# Patient Record
Sex: FEMALE | Race: WHITE | NOT HISPANIC OR LATINO | Employment: OTHER | URBAN - METROPOLITAN AREA
[De-identification: names, ages, dates, MRNs, and addresses within clinical notes are randomized per-mention and may not be internally consistent; named-entity substitution may affect disease eponyms.]

---

## 2017-03-01 ENCOUNTER — CONVERSION ENCOUNTER (OUTPATIENT)
Dept: FAMILY MEDICINE CLINIC | Facility: CLINIC | Age: 72
End: 2017-03-01

## 2017-03-10 ENCOUNTER — HOSPITAL ENCOUNTER (OUTPATIENT)
Dept: PREADMISSION TESTING | Facility: HOSPITAL | Age: 72
Discharge: HOME | End: 2017-03-10
Attending: INTERNAL MEDICINE | Admitting: INTERNAL MEDICINE

## 2017-03-10 LAB
ANION GAP SERPL CALC-SCNC: 13 MMOL/L (ref 10–20)
APTT BLD: 23.6 SEC (ref 24–31)
BASOPHILS # BLD AUTO: 0 10*3/UL (ref 0–0.2)
BASOPHILS NFR BLD AUTO: 1 % (ref 0–2)
BUN SERPL-MCNC: 25 MG/DL (ref 8–20)
BUN/CREAT SERPL: 19.2 (ref 5.4–26.2)
CALCIUM SERPL-MCNC: 10 MG/DL (ref 8.9–10.3)
CHLORIDE SERPL-SCNC: 106 MMOL/L (ref 101–111)
CONV CO2: 25 MMOL/L (ref 22–32)
CREAT UR-MCNC: 1.3 MG/DL (ref 0.4–1)
DIFFERENTIAL METHOD BLD: (no result)
EOSINOPHIL # BLD AUTO: 0.2 10*3/UL (ref 0–0.3)
EOSINOPHIL # BLD AUTO: 2 % (ref 0–3)
ERYTHROCYTE [DISTWIDTH] IN BLOOD BY AUTOMATED COUNT: 13.5 % (ref 11.5–14.5)
GLUCOSE SERPL-MCNC: 131 MG/DL (ref 65–99)
HCT VFR BLD AUTO: 33.4 % (ref 35–49)
HGB BLD-MCNC: 11.6 G/DL (ref 12–15)
INR PPP: 0.8
LYMPHOCYTES # BLD AUTO: 1 10*3/UL (ref 0.8–4.8)
LYMPHOCYTES NFR BLD AUTO: 14 % (ref 18–42)
MCH RBC QN AUTO: 28.9 PG (ref 26–32)
MCHC RBC AUTO-ENTMCNC: 34.7 G/DL (ref 32–36)
MCV RBC AUTO: 83.3 FL (ref 80–94)
MONOCYTES # BLD AUTO: 0.5 10*3/UL (ref 0.1–1.3)
MONOCYTES NFR BLD AUTO: 7 % (ref 2–11)
NEUTROPHILS # BLD AUTO: 5.5 10*3/UL (ref 2.3–8.6)
NEUTROPHILS NFR BLD AUTO: 76 % (ref 50–75)
NRBC BLD AUTO-RTO: 0 /100{WBCS}
NRBC/RBC NFR BLD MANUAL: 0 10*3/UL
PLATELET # BLD AUTO: 218 10*3/UL (ref 150–450)
PMV BLD AUTO: 8 FL (ref 7.4–10.4)
POTASSIUM SERPL-SCNC: 4 MMOL/L (ref 3.6–5.1)
PROTHROMBIN TIME: 11.3 SEC (ref 9.6–11.7)
RBC # BLD AUTO: 4.01 10*6/UL (ref 4–5.4)
SODIUM SERPL-SCNC: 140 MMOL/L (ref 136–144)
WBC # BLD AUTO: 7.3 10*3/UL (ref 4.5–11.5)

## 2017-07-10 ENCOUNTER — CONVERSION ENCOUNTER (OUTPATIENT)
Dept: FAMILY MEDICINE CLINIC | Facility: CLINIC | Age: 72
End: 2017-07-10

## 2018-07-09 ENCOUNTER — CONVERSION ENCOUNTER (OUTPATIENT)
Dept: FAMILY MEDICINE CLINIC | Facility: CLINIC | Age: 73
End: 2018-07-09

## 2018-10-15 ENCOUNTER — HOSPITAL ENCOUNTER (OUTPATIENT)
Dept: FAMILY MEDICINE CLINIC | Facility: CLINIC | Age: 73
Setting detail: SPECIMEN
Discharge: HOME | End: 2018-10-15
Attending: FAMILY MEDICINE | Admitting: FAMILY MEDICINE

## 2018-10-15 ENCOUNTER — CONVERSION ENCOUNTER (OUTPATIENT)
Dept: FAMILY MEDICINE CLINIC | Facility: CLINIC | Age: 73
End: 2018-10-15

## 2018-10-15 LAB
ALBUMIN SERPL-MCNC: 4.3 G/DL (ref 3.5–4.8)
ALBUMIN/GLOB SERPL: 1.5 {RATIO} (ref 1–1.7)
ALP SERPL-CCNC: 78 IU/L (ref 32–91)
ALT SERPL-CCNC: 18 IU/L (ref 14–54)
ANION GAP SERPL CALC-SCNC: 15.1 MMOL/L (ref 10–20)
AST SERPL-CCNC: 22 IU/L (ref 15–41)
BASOPHILS # BLD AUTO: 0.1 10*3/UL (ref 0–0.2)
BASOPHILS NFR BLD AUTO: 1 % (ref 0–2)
BILIRUB SERPL-MCNC: 0.9 MG/DL (ref 0.3–1.2)
BUN SERPL-MCNC: 27 MG/DL (ref 8–20)
BUN/CREAT SERPL: 16.9 (ref 5.4–26.2)
CALCIUM SERPL-MCNC: 10.4 MG/DL (ref 8.9–10.3)
CHLORIDE SERPL-SCNC: 103 MMOL/L (ref 101–111)
CHOLEST SERPL-MCNC: 212 MG/DL
CHOLEST/HDLC SERPL: 6 {RATIO}
CONV CO2: 24 MMOL/L (ref 22–32)
CONV LDL CHOLESTEROL DIRECT: 79 MG/DL (ref 0–100)
CONV TOTAL PROTEIN: 7.2 G/DL (ref 6.1–7.9)
CREAT UR-MCNC: 1.6 MG/DL (ref 0.4–1)
CRP SERPL-MCNC: 0.6 MG/DL (ref 0–0.7)
DIFFERENTIAL METHOD BLD: (no result)
EOSINOPHIL # BLD AUTO: 0.2 10*3/UL (ref 0–0.3)
EOSINOPHIL # BLD AUTO: 2 % (ref 0–3)
ERYTHROCYTE [DISTWIDTH] IN BLOOD BY AUTOMATED COUNT: 14.8 % (ref 11.5–14.5)
ERYTHROCYTE [SEDIMENTATION RATE] IN BLOOD BY WESTERGREN METHOD: 22 MM/HR (ref 0–30)
FOLATE SERPL-MCNC: 20.1 NG/ML (ref 5.9–24.8)
GLOBULIN UR ELPH-MCNC: 2.9 G/DL (ref 2.5–3.8)
GLUCOSE SERPL-MCNC: 152 MG/DL (ref 65–99)
HBA1C MFR BLD: 6.7 % (ref 0–5.6)
HCT VFR BLD AUTO: 35 % (ref 35–49)
HDLC SERPL-MCNC: 36 MG/DL
HGB BLD-MCNC: 12 G/DL (ref 12–15)
IRON SATN MFR SERPL: 20 % (ref 15–50)
IRON SERPL-MCNC: 66 UG/DL (ref 28–170)
LDLC/HDLC SERPL: 2.2 {RATIO}
LIPID INTERPRETATION: ABNORMAL
LYMPHOCYTES # BLD AUTO: 0.9 10*3/UL (ref 0.8–4.8)
LYMPHOCYTES NFR BLD AUTO: 12 % (ref 18–42)
MAGNESIUM SERPL-MCNC: 1.6 MG/DL (ref 1.8–2.5)
MCH RBC QN AUTO: 28.1 PG (ref 26–32)
MCHC RBC AUTO-ENTMCNC: 34.4 G/DL (ref 32–36)
MCV RBC AUTO: 81.9 FL (ref 80–94)
MONOCYTES # BLD AUTO: 0.4 10*3/UL (ref 0.1–1.3)
MONOCYTES NFR BLD AUTO: 6 % (ref 2–11)
NEUTROPHILS # BLD AUTO: 5.7 10*3/UL (ref 2.3–8.6)
NEUTROPHILS NFR BLD AUTO: 79 % (ref 50–75)
NRBC BLD AUTO-RTO: 0 /100{WBCS}
NRBC/RBC NFR BLD MANUAL: 0 10*3/UL
PLATELET # BLD AUTO: 254 10*3/UL (ref 150–450)
PMV BLD AUTO: 7.9 FL (ref 7.4–10.4)
POTASSIUM SERPL-SCNC: 4.1 MMOL/L (ref 3.6–5.1)
RBC # BLD AUTO: 4.27 10*6/UL (ref 4–5.4)
SODIUM SERPL-SCNC: 138 MMOL/L (ref 136–144)
TIBC SERPL-MCNC: 331 UG/DL (ref 228–428)
TRIGL SERPL-MCNC: 405 MG/DL
TSH SERPL-ACNC: 1.63 UIU/ML (ref 0.34–5.6)
VIT B12 SERPL-MCNC: 725 PG/ML (ref 180–914)
VLDLC SERPL CALC-MCNC: 97.2 MG/DL
WBC # BLD AUTO: 7.3 10*3/UL (ref 4.5–11.5)

## 2018-10-17 LAB
ANA SER QL IA: NEGATIVE
CHROMATIN AB SERPL-ACNC: <20 [IU]/ML (ref 0–20)

## 2018-10-18 LAB — VIT B1 BLD-MCNC: 9 NMOL/L (ref 8–30)

## 2018-10-29 ENCOUNTER — HOSPITAL ENCOUNTER (OUTPATIENT)
Dept: RESPIRATORY THERAPY | Facility: HOSPITAL | Age: 73
Discharge: HOME | End: 2018-10-29
Attending: FAMILY MEDICINE | Admitting: FAMILY MEDICINE

## 2018-11-16 ENCOUNTER — HOSPITAL ENCOUNTER (OUTPATIENT)
Dept: FAMILY MEDICINE CLINIC | Facility: CLINIC | Age: 73
Setting detail: SPECIMEN
Discharge: HOME | End: 2018-11-16
Attending: PHYSICIAN ASSISTANT | Admitting: PHYSICIAN ASSISTANT

## 2018-11-16 ENCOUNTER — CONVERSION ENCOUNTER (OUTPATIENT)
Dept: FAMILY MEDICINE CLINIC | Facility: CLINIC | Age: 73
End: 2018-11-16

## 2018-11-16 ENCOUNTER — HOSPITAL ENCOUNTER (OUTPATIENT)
Dept: CARDIOLOGY | Facility: HOSPITAL | Age: 73
Discharge: HOME | End: 2018-11-16
Attending: PHYSICIAN ASSISTANT | Admitting: PHYSICIAN ASSISTANT

## 2018-11-16 LAB
ALBUMIN SERPL-MCNC: 4.2 G/DL (ref 3.5–4.8)
ALBUMIN/GLOB SERPL: 1.4 {RATIO} (ref 1–1.7)
ALP SERPL-CCNC: 68 IU/L (ref 32–91)
ALT SERPL-CCNC: 15 IU/L (ref 14–54)
ANION GAP SERPL CALC-SCNC: 15.3 MMOL/L (ref 10–20)
AST SERPL-CCNC: 26 IU/L (ref 15–41)
BACTERIA SPEC AEROBE CULT: NORMAL
BASOPHILS # BLD AUTO: 0.1 10*3/UL (ref 0–0.2)
BASOPHILS NFR BLD AUTO: 1 % (ref 0–2)
BILIRUB SERPL-MCNC: 0.9 MG/DL (ref 0.3–1.2)
BUN SERPL-MCNC: 26 MG/DL (ref 8–20)
BUN/CREAT SERPL: 15.3 (ref 5.4–26.2)
CALCIUM SERPL-MCNC: 10.4 MG/DL (ref 8.9–10.3)
CHLORIDE SERPL-SCNC: 104 MMOL/L (ref 101–111)
CONV CO2: 23 MMOL/L (ref 22–32)
CONV TOTAL PROTEIN: 7.2 G/DL (ref 6.1–7.9)
CREAT UR-MCNC: 1.7 MG/DL (ref 0.4–1)
DIFFERENTIAL METHOD BLD: (no result)
EOSINOPHIL # BLD AUTO: 0.2 10*3/UL (ref 0–0.3)
EOSINOPHIL # BLD AUTO: 2 % (ref 0–3)
ERYTHROCYTE [DISTWIDTH] IN BLOOD BY AUTOMATED COUNT: 14.2 % (ref 11.5–14.5)
GLOBULIN UR ELPH-MCNC: 3 G/DL (ref 2.5–3.8)
GLUCOSE SERPL-MCNC: 138 MG/DL (ref 65–99)
HCT VFR BLD AUTO: 34.1 % (ref 35–49)
HGB BLD-MCNC: 11.5 G/DL (ref 12–15)
LYMPHOCYTES # BLD AUTO: 1.2 10*3/UL (ref 0.8–4.8)
LYMPHOCYTES NFR BLD AUTO: 13 % (ref 18–42)
Lab: NORMAL
MCH RBC QN AUTO: 27.9 PG (ref 26–32)
MCHC RBC AUTO-ENTMCNC: 33.6 G/DL (ref 32–36)
MCV RBC AUTO: 83 FL (ref 80–94)
MICRO REPORT STATUS: NORMAL
MONOCYTES # BLD AUTO: 0.7 10*3/UL (ref 0.1–1.3)
MONOCYTES NFR BLD AUTO: 8 % (ref 2–11)
NEUTROPHILS # BLD AUTO: 6.9 10*3/UL (ref 2.3–8.6)
NEUTROPHILS NFR BLD AUTO: 76 % (ref 50–75)
NRBC BLD AUTO-RTO: 0 /100{WBCS}
NRBC/RBC NFR BLD MANUAL: 0 10*3/UL
PLATELET # BLD AUTO: 304 10*3/UL (ref 150–450)
PMV BLD AUTO: 8.2 FL (ref 7.4–10.4)
POTASSIUM SERPL-SCNC: 4.3 MMOL/L (ref 3.6–5.1)
RBC # BLD AUTO: 4.11 10*6/UL (ref 4–5.4)
SODIUM SERPL-SCNC: 138 MMOL/L (ref 136–144)
SPECIMEN SOURCE: NORMAL
WBC # BLD AUTO: 9.1 10*3/UL (ref 4.5–11.5)

## 2018-11-19 LAB — EBV AB TO VIRAL CAPSID AG, IGM: NORMAL

## 2018-11-30 ENCOUNTER — HOSPITAL ENCOUNTER (OUTPATIENT)
Dept: NUCLEAR MEDICINE | Facility: HOSPITAL | Age: 73
Discharge: HOME | End: 2018-11-30
Attending: FAMILY MEDICINE | Admitting: FAMILY MEDICINE

## 2018-12-05 ENCOUNTER — HOSPITAL ENCOUNTER (OUTPATIENT)
Dept: ONCOLOGY | Facility: HOSPITAL | Age: 73
Discharge: HOME | End: 2018-12-05
Attending: INTERNAL MEDICINE | Admitting: INTERNAL MEDICINE

## 2018-12-05 ENCOUNTER — HOSPITAL ENCOUNTER (OUTPATIENT)
Dept: ONCOLOGY | Facility: CLINIC | Age: 73
Setting detail: INFUSION SERIES
Discharge: HOME | End: 2018-12-05
Attending: INTERNAL MEDICINE | Admitting: INTERNAL MEDICINE

## 2018-12-05 ENCOUNTER — CLINICAL SUPPORT (OUTPATIENT)
Dept: ONCOLOGY | Facility: HOSPITAL | Age: 73
End: 2018-12-05

## 2018-12-05 LAB
FERRITIN SERPL-MCNC: 159 NG/ML (ref 11–307)
FOLATE SERPL-MCNC: 12.9 NG/ML (ref 5.9–24.8)
IRON SATN MFR SERPL: 7 % (ref 15–50)
IRON SERPL-MCNC: 29 UG/DL (ref 28–170)
MAGNESIUM UR-MCNC: 1.11 % (ref 0.5–1.5)
RETICS/RBC NFR MANUAL: 0.05 10*6/UL
TIBC SERPL-MCNC: 397 UG/DL (ref 228–428)

## 2018-12-05 NOTE — PROGRESS NOTES
PATIENTS ONCOLOGY RECORD LOCATED IN [facility]      Subjective     Name:  CHANELLE GONZALES     Date:  2018  Address:  [address] [place] [address]  Home: [unfilled]  :  1945 AGE:  73 y.o.        RECORDS OBTAINED:  Patients Oncology Record is located in [facility]

## 2018-12-07 LAB — HAPTOGLOB SERPL-MCNC: 127 MG/DL (ref 36–195)

## 2018-12-10 ENCOUNTER — CLINICAL SUPPORT (OUTPATIENT)
Dept: ONCOLOGY | Facility: HOSPITAL | Age: 73
End: 2018-12-10

## 2018-12-10 ENCOUNTER — HOSPITAL ENCOUNTER (OUTPATIENT)
Dept: ONCOLOGY | Facility: CLINIC | Age: 73
Setting detail: INFUSION SERIES
Discharge: HOME | End: 2018-12-10
Attending: INTERNAL MEDICINE | Admitting: INTERNAL MEDICINE

## 2018-12-10 ENCOUNTER — HOSPITAL ENCOUNTER (OUTPATIENT)
Dept: FAMILY MEDICINE CLINIC | Facility: CLINIC | Age: 73
Setting detail: SPECIMEN
Discharge: HOME | End: 2018-12-10
Attending: FAMILY MEDICINE | Admitting: FAMILY MEDICINE

## 2018-12-10 LAB
ALBUMIN SERPL-MCNC: 4 G/DL (ref 3.5–4.8)
ALBUMIN/GLOB SERPL: 1.3 {RATIO} (ref 1–1.7)
ALP SERPL-CCNC: 68 IU/L (ref 32–91)
ALT SERPL-CCNC: 16 IU/L (ref 14–54)
ANION GAP SERPL CALC-SCNC: 13.2 MMOL/L (ref 10–20)
AST SERPL-CCNC: 22 IU/L (ref 15–41)
BASOPHILS # BLD AUTO: 0.1 10*3/UL (ref 0–0.2)
BASOPHILS NFR BLD AUTO: 1 % (ref 0–2)
BILIRUB SERPL-MCNC: 0.7 MG/DL (ref 0.3–1.2)
BUN SERPL-MCNC: 29 MG/DL (ref 8–20)
BUN/CREAT SERPL: 18.1 (ref 5.4–26.2)
CALCIUM SERPL-MCNC: 10 MG/DL (ref 8.9–10.3)
CHLORIDE SERPL-SCNC: 104 MMOL/L (ref 101–111)
CONV CO2: 24 MMOL/L (ref 22–32)
CONV TOTAL PROTEIN: 7.2 G/DL (ref 6.1–7.9)
CREAT UR-MCNC: 1.6 MG/DL (ref 0.4–1)
DIFFERENTIAL METHOD BLD: (no result)
EOSINOPHIL # BLD AUTO: 0.2 10*3/UL (ref 0–0.3)
EOSINOPHIL # BLD AUTO: 2 % (ref 0–3)
ERYTHROCYTE [DISTWIDTH] IN BLOOD BY AUTOMATED COUNT: 14.1 % (ref 11.5–14.5)
GLOBULIN UR ELPH-MCNC: 3.2 G/DL (ref 2.5–3.8)
GLUCOSE SERPL-MCNC: 169 MG/DL (ref 65–99)
HCT VFR BLD AUTO: 32.2 % (ref 35–49)
HGB BLD-MCNC: 10.8 G/DL (ref 12–15)
LYMPHOCYTES # BLD AUTO: 0.8 10*3/UL (ref 0.8–4.8)
LYMPHOCYTES NFR BLD AUTO: 11 % (ref 18–42)
MCH RBC QN AUTO: 27.8 PG (ref 26–32)
MCHC RBC AUTO-ENTMCNC: 33.6 G/DL (ref 32–36)
MCV RBC AUTO: 82.6 FL (ref 80–94)
MONOCYTES # BLD AUTO: 0.5 10*3/UL (ref 0.1–1.3)
MONOCYTES NFR BLD AUTO: 7 % (ref 2–11)
NEUTROPHILS # BLD AUTO: 6 10*3/UL (ref 2.3–8.6)
NEUTROPHILS NFR BLD AUTO: 79 % (ref 50–75)
NRBC BLD AUTO-RTO: 0 /100{WBCS}
NRBC/RBC NFR BLD MANUAL: 0 10*3/UL
PLATELET # BLD AUTO: 305 10*3/UL (ref 150–450)
PMV BLD AUTO: 7.7 FL (ref 7.4–10.4)
POTASSIUM SERPL-SCNC: 4.2 MMOL/L (ref 3.6–5.1)
RBC # BLD AUTO: 3.9 10*6/UL (ref 4–5.4)
SODIUM SERPL-SCNC: 137 MMOL/L (ref 136–144)
WBC # BLD AUTO: 7.6 10*3/UL (ref 4.5–11.5)

## 2018-12-10 NOTE — PROGRESS NOTES
PATIENTS ONCOLOGY RECORD LOCATED IN [employer]      Subjective     Name:  CHANELLE GONZALES     Date:  12/10/2018  Address:  [address] [place] [address]  Home: [unfilled]  :  1945 AGE:  73 y.o.        RECORDS OBTAINED:  Patients Oncology Record is located in [facility]

## 2018-12-11 ENCOUNTER — HOSPITAL ENCOUNTER (OUTPATIENT)
Dept: ONCOLOGY | Facility: HOSPITAL | Age: 73
Discharge: HOME | End: 2018-12-11
Attending: INTERNAL MEDICINE | Admitting: INTERNAL MEDICINE

## 2018-12-21 ENCOUNTER — HOSPITAL ENCOUNTER (OUTPATIENT)
Dept: ONCOLOGY | Facility: CLINIC | Age: 73
Setting detail: INFUSION SERIES
Discharge: HOME | End: 2018-12-21
Attending: INTERNAL MEDICINE | Admitting: INTERNAL MEDICINE

## 2018-12-21 ENCOUNTER — CLINICAL SUPPORT (OUTPATIENT)
Dept: ONCOLOGY | Facility: HOSPITAL | Age: 73
End: 2018-12-21

## 2018-12-21 ENCOUNTER — HOSPITAL ENCOUNTER (OUTPATIENT)
Dept: ONCOLOGY | Facility: HOSPITAL | Age: 73
Discharge: HOME | End: 2018-12-21
Attending: INTERNAL MEDICINE | Admitting: INTERNAL MEDICINE

## 2018-12-21 LAB
ALBUMIN SERPL-MCNC: 3.9 G/DL (ref 3.5–4.8)
ALBUMIN/GLOB SERPL: 1.4 {RATIO} (ref 1–1.7)
ALP SERPL-CCNC: 66 IU/L (ref 32–91)
ALT SERPL-CCNC: 13 IU/L (ref 14–54)
ANION GAP SERPL CALC-SCNC: 15.1 MMOL/L (ref 10–20)
AST SERPL-CCNC: 18 IU/L (ref 15–41)
BILIRUB SERPL-MCNC: 0.4 MG/DL (ref 0.3–1.2)
BUN SERPL-MCNC: 24 MG/DL (ref 8–20)
BUN/CREAT SERPL: 16 (ref 5.4–26.2)
CALCIUM SERPL-MCNC: 10 MG/DL (ref 8.9–10.3)
CHLORIDE SERPL-SCNC: 104 MMOL/L (ref 101–111)
CONV CO2: 22 MMOL/L (ref 22–32)
CONV TOTAL PROTEIN: 6.7 G/DL (ref 6.1–7.9)
CREAT UR-MCNC: 1.5 MG/DL (ref 0.4–1)
FERRITIN SERPL-MCNC: 138 NG/ML (ref 11–307)
FOLATE SERPL-MCNC: 13.1 NG/ML (ref 5.9–24.8)
GLOBULIN UR ELPH-MCNC: 2.8 G/DL (ref 2.5–3.8)
GLUCOSE SERPL-MCNC: 140 MG/DL (ref 65–99)
IRON SATN MFR SERPL: 8 % (ref 15–50)
IRON SERPL-MCNC: 33 UG/DL (ref 28–170)
MAGNESIUM UR-MCNC: 1.26 % (ref 0.5–1.5)
POTASSIUM SERPL-SCNC: 4.1 MMOL/L (ref 3.6–5.1)
RETICS/RBC NFR MANUAL: 0.05 10*6/UL
SODIUM SERPL-SCNC: 137 MMOL/L (ref 136–144)
TIBC SERPL-MCNC: 389 UG/DL (ref 228–428)

## 2018-12-21 NOTE — PROGRESS NOTES
PATIENTS ONCOLOGY RECORD LOCATED IN [facility]      Subjective     Name:  CHANELLE GONZALES     Date:  2018  Address:  [address] [place] [address]  Home: [unfilled]  :  1945 AGE:  73 y.o.        RECORDS OBTAINED:  Patients Oncology Record is located in [facility]

## 2018-12-24 ENCOUNTER — CLINICAL SUPPORT (OUTPATIENT)
Dept: ONCOLOGY | Facility: HOSPITAL | Age: 73
End: 2018-12-24

## 2018-12-24 ENCOUNTER — HOSPITAL ENCOUNTER (OUTPATIENT)
Dept: ONCOLOGY | Facility: CLINIC | Age: 73
Setting detail: INFUSION SERIES
Discharge: HOME | End: 2018-12-24
Attending: INTERNAL MEDICINE | Admitting: INTERNAL MEDICINE

## 2018-12-24 ENCOUNTER — HOSPITAL ENCOUNTER (OUTPATIENT)
Dept: ONCOLOGY | Facility: HOSPITAL | Age: 73
Discharge: HOME | End: 2018-12-24
Attending: INTERNAL MEDICINE | Admitting: INTERNAL MEDICINE

## 2018-12-24 LAB
ALBUMIN SERPL-MCNC: 3.5 G/DL (ref 3.5–4.8)
ALPHA1 GLOB FLD ELPH-MCNC: 0.3 GM/DL (ref 0.1–0.4)
ALPHA2 GLOB SERPL ELPH-MCNC: 0.7 GM/DL (ref 0.5–1)
B-GLOBULIN SERPL ELPH-MCNC: 1.1 GM/DL (ref 0.7–1.4)
CONV TOTAL PROTEIN: 6.8 G/DL (ref 6.1–7.9)
GAMMA GLOB SERPL ELPH-MCNC: 1.1 GM/DL (ref 0.6–1.6)
HAPTOGLOB SERPL-MCNC: 157 MG/DL (ref 36–195)
IGA1 MFR SER: 113 MG/DL (ref 50–400)
IGG1 SER-MCNC: 990 MG/DL (ref 600–1500)
IGM SERPL-MCNC: 43 MG/DL (ref 40–300)
INSULIN SERPL-ACNC: NORMAL U[IU]/ML

## 2018-12-24 NOTE — PROGRESS NOTES
PATIENTS ONCOLOGY RECORD LOCATED IN [facility]      Subjective     Name:  CHANELLE GONZALES     Date:  2018  Address:  [address] [place] [address]  Home: [unfilled]  :  1945 AGE:  73 y.o.        RECORDS OBTAINED:  Patients Oncology Record is located in [facility]

## 2018-12-25 LAB
KAPPA LC SERPL-MCNC: 1.9 MG/DL (ref 0.33–1.94)
KAPPA LC/LAMBDA SER: 0.98 {RATIO} (ref 0.26–1.65)
LAMBDA LC FREE SERPL-MCNC: 1.94 MG/DL (ref 0.57–2.63)

## 2018-12-26 LAB
INTERPRETATION UR IFE-IMP: NORMAL
PROT PATTERN SERPL IFE-IMP: NORMAL

## 2018-12-28 LAB
INSULIN SERPL-ACNC: NORMAL U[IU]/ML
URINE PROT ELECTRO: NORMAL

## 2019-01-01 ENCOUNTER — TELEPHONE (OUTPATIENT)
Dept: ONCOLOGY | Facility: CLINIC | Age: 74
End: 2019-01-01

## 2019-01-01 ENCOUNTER — HOSPITAL ENCOUNTER (OUTPATIENT)
Dept: CT IMAGING | Facility: HOSPITAL | Age: 74
Discharge: HOME | End: 2019-07-23
Admitting: NURSE PRACTITIONER

## 2019-01-01 ENCOUNTER — DOCUMENTATION (OUTPATIENT)
Dept: ONCOLOGY | Facility: HOSPITAL | Age: 74
End: 2019-01-01

## 2019-01-01 ENCOUNTER — HOSPITAL ENCOUNTER (OUTPATIENT)
Dept: ONCOLOGY | Facility: HOSPITAL | Age: 74
Setting detail: INFUSION SERIES
Discharge: HOME | End: 2019-07-01

## 2019-01-01 ENCOUNTER — HOSPITAL ENCOUNTER (OUTPATIENT)
Dept: RADIATION ONCOLOGY | Facility: HOSPITAL | Age: 74
Discharge: HOME | End: 2019-08-07

## 2019-01-01 ENCOUNTER — OFFICE VISIT (OUTPATIENT)
Dept: ONCOLOGY | Facility: CLINIC | Age: 74
End: 2019-01-01

## 2019-01-01 ENCOUNTER — HOSPITAL ENCOUNTER (OUTPATIENT)
Dept: RADIATION ONCOLOGY | Facility: HOSPITAL | Age: 74
Discharge: HOME | End: 2019-07-26

## 2019-01-01 ENCOUNTER — HOSPITAL ENCOUNTER (OUTPATIENT)
Dept: ONCOLOGY | Facility: CLINIC | Age: 74
Setting detail: INFUSION SERIES
Discharge: HOME | End: 2019-04-08
Attending: INTERNAL MEDICINE | Admitting: INTERNAL MEDICINE

## 2019-01-01 ENCOUNTER — HOSPITAL ENCOUNTER (OUTPATIENT)
Dept: ONCOLOGY | Facility: HOSPITAL | Age: 74
Discharge: HOME | End: 2019-10-08
Admitting: NURSE PRACTITIONER

## 2019-01-01 ENCOUNTER — DOCUMENTATION (OUTPATIENT)
Dept: ONCOLOGY | Facility: CLINIC | Age: 74
End: 2019-01-01

## 2019-01-01 ENCOUNTER — LAB (OUTPATIENT)
Dept: LAB | Facility: HOSPITAL | Age: 74
End: 2019-01-01

## 2019-01-01 ENCOUNTER — HOSPITAL ENCOUNTER (OUTPATIENT)
Dept: ONCOLOGY | Facility: HOSPITAL | Age: 74
Setting detail: INFUSION SERIES
Discharge: HOME | End: 2019-07-15

## 2019-01-01 ENCOUNTER — APPOINTMENT (OUTPATIENT)
Dept: LAB | Facility: HOSPITAL | Age: 74
End: 2019-01-01

## 2019-01-01 ENCOUNTER — TELEPHONE (OUTPATIENT)
Dept: ONCOLOGY | Facility: HOSPITAL | Age: 74
End: 2019-01-01

## 2019-01-01 ENCOUNTER — HOSPITAL ENCOUNTER (OUTPATIENT)
Dept: ONCOLOGY | Facility: HOSPITAL | Age: 74
Setting detail: INFUSION SERIES
Discharge: HOME | End: 2019-09-09

## 2019-01-01 ENCOUNTER — HOSPITAL ENCOUNTER (OUTPATIENT)
Dept: RADIATION ONCOLOGY | Facility: HOSPITAL | Age: 74
Discharge: HOME | End: 2019-07-30

## 2019-01-01 ENCOUNTER — HOSPITAL ENCOUNTER (OUTPATIENT)
Dept: ONCOLOGY | Facility: HOSPITAL | Age: 74
Discharge: HOME | End: 2019-10-15
Admitting: NURSE PRACTITIONER

## 2019-01-01 ENCOUNTER — HOSPITAL ENCOUNTER (OUTPATIENT)
Dept: ONCOLOGY | Facility: HOSPITAL | Age: 74
Setting detail: INFUSION SERIES
Discharge: HOME | End: 2019-09-30

## 2019-01-01 ENCOUNTER — CLINICAL SUPPORT (OUTPATIENT)
Dept: ONCOLOGY | Facility: HOSPITAL | Age: 74
End: 2019-01-01

## 2019-01-01 ENCOUNTER — HOSPITAL ENCOUNTER (OUTPATIENT)
Dept: GENERAL RADIOLOGY | Facility: HOSPITAL | Age: 74
Discharge: HOME | End: 2019-08-27
Admitting: INTERNAL MEDICINE

## 2019-01-01 ENCOUNTER — HOSPITAL ENCOUNTER (OUTPATIENT)
Dept: PET IMAGING | Facility: HOSPITAL | Age: 74
Discharge: HOME | End: 2019-07-24
Admitting: NURSE PRACTITIONER

## 2019-01-01 ENCOUNTER — HOSPITAL ENCOUNTER (OUTPATIENT)
Dept: ONCOLOGY | Facility: HOSPITAL | Age: 74
Discharge: HOME | End: 2019-09-03
Admitting: INTERNAL MEDICINE

## 2019-01-01 ENCOUNTER — HOSPITAL ENCOUNTER (OUTPATIENT)
Dept: RADIATION ONCOLOGY | Facility: HOSPITAL | Age: 74
Discharge: HOME | End: 2019-08-06

## 2019-01-01 ENCOUNTER — HOSPITAL ENCOUNTER (OUTPATIENT)
Dept: ONCOLOGY | Facility: HOSPITAL | Age: 74
Discharge: HOME | End: 2019-04-15
Attending: INTERNAL MEDICINE | Admitting: INTERNAL MEDICINE

## 2019-01-01 ENCOUNTER — HOSPITAL ENCOUNTER (OUTPATIENT)
Dept: ONCOLOGY | Facility: HOSPITAL | Age: 74
Discharge: HOME | End: 2019-04-22
Attending: NURSE PRACTITIONER | Admitting: NURSE PRACTITIONER

## 2019-01-01 ENCOUNTER — HOSPITAL ENCOUNTER (OUTPATIENT)
Dept: ONCOLOGY | Facility: HOSPITAL | Age: 74
Discharge: HOME | End: 2019-08-13
Admitting: NURSE PRACTITIONER

## 2019-01-01 ENCOUNTER — OFFICE VISIT (OUTPATIENT)
Dept: LAB | Facility: HOSPITAL | Age: 74
End: 2019-01-01

## 2019-01-01 ENCOUNTER — TELEPHONE (OUTPATIENT)
Dept: FAMILY MEDICINE CLINIC | Facility: CLINIC | Age: 74
End: 2019-01-01

## 2019-01-01 ENCOUNTER — HOSPITAL ENCOUNTER (OUTPATIENT)
Dept: ONCOLOGY | Facility: HOSPITAL | Age: 74
Setting detail: INFUSION SERIES
Discharge: HOME | End: 2019-07-08

## 2019-01-01 ENCOUNTER — HOSPITAL ENCOUNTER (OUTPATIENT)
Dept: RADIATION ONCOLOGY | Facility: HOSPITAL | Age: 74
Discharge: HOME | End: 2019-07-25

## 2019-01-01 ENCOUNTER — HOSPITAL ENCOUNTER (OUTPATIENT)
Dept: ONCOLOGY | Facility: HOSPITAL | Age: 74
Setting detail: INFUSION SERIES
Discharge: HOME | End: 2019-10-21

## 2019-01-01 ENCOUNTER — HOSPITAL ENCOUNTER (OUTPATIENT)
Facility: HOSPITAL | Age: 74
Setting detail: OBSERVATION
LOS: 1 days | Discharge: HOME | End: 2019-11-12
Attending: INTERNAL MEDICINE | Admitting: INTERNAL MEDICINE

## 2019-01-01 ENCOUNTER — APPOINTMENT (OUTPATIENT)
Dept: ONCOLOGY | Facility: HOSPITAL | Age: 74
End: 2019-01-01

## 2019-01-01 ENCOUNTER — HOSPITAL ENCOUNTER (OUTPATIENT)
Dept: ONCOLOGY | Facility: HOSPITAL | Age: 74
Setting detail: INFUSION SERIES
Discharge: HOME | End: 2019-07-22

## 2019-01-01 ENCOUNTER — HOSPITAL ENCOUNTER (OUTPATIENT)
Dept: GENERAL RADIOLOGY | Facility: HOSPITAL | Age: 74
End: 2019-01-01

## 2019-01-01 ENCOUNTER — HOSPITAL ENCOUNTER (OUTPATIENT)
Dept: ONCOLOGY | Facility: CLINIC | Age: 74
Setting detail: INFUSION SERIES
Discharge: HOME | End: 2019-06-10
Attending: INTERNAL MEDICINE | Admitting: INTERNAL MEDICINE

## 2019-01-01 ENCOUNTER — HOSPITAL ENCOUNTER (OUTPATIENT)
Dept: INFUSION THERAPY | Facility: HOSPITAL | Age: 74
Discharge: HOME | End: 2019-04-19
Attending: RADIOLOGY | Admitting: RADIOLOGY

## 2019-01-01 ENCOUNTER — HOSPITAL ENCOUNTER (OUTPATIENT)
Dept: RADIATION ONCOLOGY | Facility: HOSPITAL | Age: 74
Discharge: HOME | End: 2019-08-05

## 2019-01-01 ENCOUNTER — HOSPITAL ENCOUNTER (OUTPATIENT)
Dept: ONCOLOGY | Facility: CLINIC | Age: 74
Setting detail: INFUSION SERIES
Discharge: HOME | End: 2019-04-15
Attending: INTERNAL MEDICINE | Admitting: INTERNAL MEDICINE

## 2019-01-01 ENCOUNTER — APPOINTMENT (OUTPATIENT)
Dept: GENERAL RADIOLOGY | Facility: HOSPITAL | Age: 74
End: 2019-01-01

## 2019-01-01 ENCOUNTER — LAB REQUISITION (OUTPATIENT)
Dept: LAB | Facility: HOSPITAL | Age: 74
End: 2019-01-01

## 2019-01-01 ENCOUNTER — HOSPITAL ENCOUNTER (OUTPATIENT)
Dept: ONCOLOGY | Facility: HOSPITAL | Age: 74
Discharge: HOME | End: 2019-04-01
Attending: INTERNAL MEDICINE | Admitting: INTERNAL MEDICINE

## 2019-01-01 ENCOUNTER — HOSPITAL ENCOUNTER (OUTPATIENT)
Dept: ONCOLOGY | Facility: HOSPITAL | Age: 74
Setting detail: INFUSION SERIES
Discharge: HOME | End: 2019-11-05

## 2019-01-01 ENCOUNTER — HOSPITAL ENCOUNTER (OUTPATIENT)
Dept: RADIATION ONCOLOGY | Facility: HOSPITAL | Age: 74
Setting detail: RADIATION/ONCOLOGY SERIES
Discharge: HOME | End: 2019-07-24

## 2019-01-01 ENCOUNTER — HOSPITAL ENCOUNTER (OUTPATIENT)
Dept: RADIATION ONCOLOGY | Facility: HOSPITAL | Age: 74
Discharge: HOME | End: 2019-08-02

## 2019-01-01 ENCOUNTER — HOSPITAL ENCOUNTER (OUTPATIENT)
Dept: CARDIOLOGY | Facility: HOSPITAL | Age: 74
Discharge: HOME | End: 2019-07-09
Admitting: NURSE PRACTITIONER

## 2019-01-01 ENCOUNTER — HOSPITAL ENCOUNTER (OUTPATIENT)
Dept: ONCOLOGY | Facility: HOSPITAL | Age: 74
Setting detail: INFUSION SERIES
Discharge: HOME | End: 2019-07-09

## 2019-01-01 ENCOUNTER — HOSPITAL ENCOUNTER (OUTPATIENT)
Dept: ONCOLOGY | Facility: HOSPITAL | Age: 74
Discharge: HOME | End: 2019-09-24
Admitting: NURSE PRACTITIONER

## 2019-01-01 ENCOUNTER — HOSPITAL ENCOUNTER (OUTPATIENT)
Dept: FAMILY MEDICINE CLINIC | Facility: CLINIC | Age: 74
Setting detail: SPECIMEN
Discharge: HOME | End: 2019-05-22
Attending: FAMILY MEDICINE | Admitting: FAMILY MEDICINE

## 2019-01-01 ENCOUNTER — HOSPITAL ENCOUNTER (OUTPATIENT)
Dept: INFUSION THERAPY | Facility: HOSPITAL | Age: 74
Discharge: HOME | End: 2019-04-24
Attending: RADIOLOGY | Admitting: RADIOLOGY

## 2019-01-01 ENCOUNTER — HOSPITAL ENCOUNTER (OUTPATIENT)
Dept: ONCOLOGY | Facility: HOSPITAL | Age: 74
Discharge: HOME | End: 2019-06-10
Attending: INTERNAL MEDICINE | Admitting: INTERNAL MEDICINE

## 2019-01-01 ENCOUNTER — CONSULT (OUTPATIENT)
Dept: RADIATION ONCOLOGY | Facility: HOSPITAL | Age: 74
End: 2019-01-01

## 2019-01-01 ENCOUNTER — HOSPITAL ENCOUNTER (OUTPATIENT)
Dept: ONCOLOGY | Facility: HOSPITAL | Age: 74
Discharge: HOME | End: 2019-08-27
Admitting: INTERNAL MEDICINE

## 2019-01-01 ENCOUNTER — HOSPITAL ENCOUNTER (OUTPATIENT)
Dept: INFUSION THERAPY | Facility: HOSPITAL | Age: 74
Discharge: HOME | End: 2019-08-26
Admitting: FAMILY MEDICINE

## 2019-01-01 ENCOUNTER — HOSPITAL ENCOUNTER (OUTPATIENT)
Dept: RADIATION ONCOLOGY | Facility: HOSPITAL | Age: 74
Discharge: HOME | End: 2019-08-01

## 2019-01-01 ENCOUNTER — HOSPITAL ENCOUNTER (OUTPATIENT)
Dept: ONCOLOGY | Facility: HOSPITAL | Age: 74
Discharge: HOME | End: 2019-05-31
Attending: INTERNAL MEDICINE | Admitting: INTERNAL MEDICINE

## 2019-01-01 ENCOUNTER — HOSPITAL ENCOUNTER (OUTPATIENT)
Dept: ONCOLOGY | Facility: HOSPITAL | Age: 74
Setting detail: INFUSION SERIES
Discharge: HOME | End: 2019-11-11

## 2019-01-01 ENCOUNTER — LAB (OUTPATIENT)
Dept: FAMILY MEDICINE CLINIC | Facility: CLINIC | Age: 74
End: 2019-01-01

## 2019-01-01 ENCOUNTER — HOSPITAL ENCOUNTER (OUTPATIENT)
Dept: ONCOLOGY | Facility: HOSPITAL | Age: 74
Discharge: HOME | End: 2019-04-10
Attending: INTERNAL MEDICINE | Admitting: INTERNAL MEDICINE

## 2019-01-01 ENCOUNTER — HOSPITAL ENCOUNTER (OUTPATIENT)
Dept: ONCOLOGY | Facility: HOSPITAL | Age: 74
Setting detail: INFUSION SERIES
Discharge: HOME | End: 2019-06-17

## 2019-01-01 ENCOUNTER — HOSPITAL ENCOUNTER (OUTPATIENT)
Dept: ONCOLOGY | Facility: HOSPITAL | Age: 74
Setting detail: INFUSION SERIES
Discharge: HOME | End: 2019-12-02

## 2019-01-01 ENCOUNTER — HOSPITAL ENCOUNTER (OUTPATIENT)
Dept: ONCOLOGY | Facility: HOSPITAL | Age: 74
Setting detail: INFUSION SERIES
Discharge: HOME | End: 2019-06-24

## 2019-01-01 ENCOUNTER — HOSPITAL ENCOUNTER (OUTPATIENT)
Dept: ONCOLOGY | Facility: CLINIC | Age: 74
Setting detail: INFUSION SERIES
Discharge: HOME | End: 2019-04-29
Attending: INTERNAL MEDICINE | Admitting: INTERNAL MEDICINE

## 2019-01-01 ENCOUNTER — HOSPITAL ENCOUNTER (OUTPATIENT)
Dept: RADIATION ONCOLOGY | Facility: HOSPITAL | Age: 74
Setting detail: RADIATION/ONCOLOGY SERIES
End: 2019-01-01

## 2019-01-01 ENCOUNTER — HOSPITAL ENCOUNTER (OUTPATIENT)
Dept: ONCOLOGY | Facility: CLINIC | Age: 74
Setting detail: INFUSION SERIES
Discharge: HOME | End: 2019-06-03
Attending: INTERNAL MEDICINE | Admitting: INTERNAL MEDICINE

## 2019-01-01 ENCOUNTER — HOSPITAL ENCOUNTER (OUTPATIENT)
Dept: ONCOLOGY | Facility: CLINIC | Age: 74
Setting detail: INFUSION SERIES
Discharge: HOME | End: 2019-05-20
Attending: INTERNAL MEDICINE | Admitting: INTERNAL MEDICINE

## 2019-01-01 ENCOUNTER — HOSPITAL ENCOUNTER (OUTPATIENT)
Dept: RADIATION ONCOLOGY | Facility: HOSPITAL | Age: 74
Discharge: HOME | End: 2019-07-31

## 2019-01-01 ENCOUNTER — OFFICE VISIT (OUTPATIENT)
Dept: CARDIOLOGY | Facility: CLINIC | Age: 74
End: 2019-01-01

## 2019-01-01 ENCOUNTER — HOSPITAL ENCOUNTER (OUTPATIENT)
Dept: ONCOLOGY | Facility: CLINIC | Age: 74
Setting detail: INFUSION SERIES
Discharge: HOME | End: 2019-05-13
Attending: INTERNAL MEDICINE | Admitting: INTERNAL MEDICINE

## 2019-01-01 ENCOUNTER — HOSPITAL ENCOUNTER (OUTPATIENT)
Dept: ONCOLOGY | Facility: CLINIC | Age: 74
Setting detail: INFUSION SERIES
Discharge: HOME | End: 2019-04-01
Attending: INTERNAL MEDICINE | Admitting: INTERNAL MEDICINE

## 2019-01-01 ENCOUNTER — HOSPITAL ENCOUNTER (OUTPATIENT)
Dept: ONCOLOGY | Facility: CLINIC | Age: 74
Setting detail: INFUSION SERIES
Discharge: HOME | End: 2019-05-07
Attending: INTERNAL MEDICINE | Admitting: INTERNAL MEDICINE

## 2019-01-01 ENCOUNTER — HOSPITAL ENCOUNTER (OUTPATIENT)
Dept: GENERAL RADIOLOGY | Facility: HOSPITAL | Age: 74
Discharge: HOME | End: 2019-08-27

## 2019-01-01 ENCOUNTER — HOSPITAL ENCOUNTER (OUTPATIENT)
Dept: ONCOLOGY | Facility: CLINIC | Age: 74
Setting detail: INFUSION SERIES
Discharge: HOME | End: 2019-03-25
Attending: INTERNAL MEDICINE | Admitting: INTERNAL MEDICINE

## 2019-01-01 ENCOUNTER — HOSPITAL ENCOUNTER (OUTPATIENT)
Dept: ONCOLOGY | Facility: CLINIC | Age: 74
Setting detail: INFUSION SERIES
Discharge: HOME | End: 2019-05-31
Attending: INTERNAL MEDICINE | Admitting: INTERNAL MEDICINE

## 2019-01-01 ENCOUNTER — HOSPITAL ENCOUNTER (OUTPATIENT)
Dept: ONCOLOGY | Facility: CLINIC | Age: 74
Setting detail: INFUSION SERIES
Discharge: HOME | End: 2019-05-06
Attending: INTERNAL MEDICINE | Admitting: INTERNAL MEDICINE

## 2019-01-01 ENCOUNTER — TRANSCRIBE ORDERS (OUTPATIENT)
Dept: ADMINISTRATIVE | Facility: HOSPITAL | Age: 74
End: 2019-01-01

## 2019-01-01 ENCOUNTER — HOSPITAL ENCOUNTER (OUTPATIENT)
Dept: ONCOLOGY | Facility: CLINIC | Age: 74
Setting detail: INFUSION SERIES
Discharge: HOME | End: 2019-04-10
Attending: INTERNAL MEDICINE | Admitting: INTERNAL MEDICINE

## 2019-01-01 ENCOUNTER — HOSPITAL ENCOUNTER (OUTPATIENT)
Dept: ONCOLOGY | Facility: CLINIC | Age: 74
Setting detail: INFUSION SERIES
Discharge: HOME | End: 2019-03-18
Attending: INTERNAL MEDICINE | Admitting: INTERNAL MEDICINE

## 2019-01-01 ENCOUNTER — HOSPITAL ENCOUNTER (OUTPATIENT)
Dept: ULTRASOUND IMAGING | Facility: HOSPITAL | Age: 74
Discharge: HOME | End: 2019-05-20
Attending: INTERNAL MEDICINE | Admitting: INTERNAL MEDICINE

## 2019-01-01 ENCOUNTER — CONVERSION ENCOUNTER (OUTPATIENT)
Dept: FAMILY MEDICINE CLINIC | Facility: CLINIC | Age: 74
End: 2019-01-01

## 2019-01-01 ENCOUNTER — HOSPITAL ENCOUNTER (OUTPATIENT)
Dept: ONCOLOGY | Facility: HOSPITAL | Age: 74
Discharge: HOME | End: 2019-05-13
Attending: INTERNAL MEDICINE | Admitting: INTERNAL MEDICINE

## 2019-01-01 ENCOUNTER — HOSPITAL ENCOUNTER (OUTPATIENT)
Dept: RADIATION ONCOLOGY | Facility: HOSPITAL | Age: 74
Discharge: HOME | End: 2019-07-29

## 2019-01-01 ENCOUNTER — HOSPITAL ENCOUNTER (OUTPATIENT)
Dept: ONCOLOGY | Facility: HOSPITAL | Age: 74
Discharge: HOME | End: 2019-08-06
Admitting: NURSE PRACTITIONER

## 2019-01-01 ENCOUNTER — HOSPITAL ENCOUNTER (OUTPATIENT)
Dept: ONCOLOGY | Facility: HOSPITAL | Age: 74
Discharge: HOME | End: 2019-05-28
Attending: INTERNAL MEDICINE | Admitting: INTERNAL MEDICINE

## 2019-01-01 ENCOUNTER — HOSPITAL ENCOUNTER (OUTPATIENT)
Dept: ONCOLOGY | Facility: HOSPITAL | Age: 74
Discharge: HOME | End: 2019-05-07
Attending: INTERNAL MEDICINE | Admitting: INTERNAL MEDICINE

## 2019-01-01 ENCOUNTER — HOSPITAL ENCOUNTER (OUTPATIENT)
Dept: PET IMAGING | Facility: HOSPITAL | Age: 74
Discharge: HOME | End: 2019-12-30
Admitting: NURSE PRACTITIONER

## 2019-01-01 ENCOUNTER — HOSPITAL ENCOUNTER (OUTPATIENT)
Dept: ONCOLOGY | Facility: HOSPITAL | Age: 74
Setting detail: INFUSION SERIES
End: 2019-01-01

## 2019-01-01 ENCOUNTER — HOSPITAL ENCOUNTER (OUTPATIENT)
Dept: ONCOLOGY | Facility: HOSPITAL | Age: 74
Setting detail: INFUSION SERIES
Discharge: HOME | End: 2019-08-19

## 2019-01-01 ENCOUNTER — HOSPITAL ENCOUNTER (OUTPATIENT)
Dept: ONCOLOGY | Facility: HOSPITAL | Age: 74
Setting detail: INFUSION SERIES
Discharge: HOME | End: 2019-07-23

## 2019-01-01 ENCOUNTER — OFFICE VISIT (OUTPATIENT)
Dept: RADIATION ONCOLOGY | Facility: HOSPITAL | Age: 74
End: 2019-01-01

## 2019-01-01 ENCOUNTER — HOSPITAL ENCOUNTER (OUTPATIENT)
Dept: OTHER | Facility: HOSPITAL | Age: 74
Discharge: HOME | End: 2019-06-03
Attending: FAMILY MEDICINE | Admitting: FAMILY MEDICINE

## 2019-01-01 ENCOUNTER — TELEPHONE (OUTPATIENT)
Dept: RADIATION ONCOLOGY | Facility: HOSPITAL | Age: 74
End: 2019-01-01

## 2019-01-01 ENCOUNTER — CLINICAL SUPPORT NO REQUIREMENTS (OUTPATIENT)
Dept: CARDIOLOGY | Facility: CLINIC | Age: 74
End: 2019-01-01

## 2019-01-01 ENCOUNTER — HOSPITAL ENCOUNTER (OUTPATIENT)
Dept: ONCOLOGY | Facility: HOSPITAL | Age: 74
Setting detail: INFUSION SERIES
Discharge: HOME | End: 2019-12-03

## 2019-01-01 ENCOUNTER — HOSPITAL ENCOUNTER (OUTPATIENT)
Dept: ONCOLOGY | Facility: HOSPITAL | Age: 74
Discharge: HOME | End: 2019-09-17
Admitting: INTERNAL MEDICINE

## 2019-01-01 VITALS
WEIGHT: 144 LBS | RESPIRATION RATE: 18 BRPM | OXYGEN SATURATION: 97 % | HEIGHT: 67 IN | DIASTOLIC BLOOD PRESSURE: 72 MMHG | SYSTOLIC BLOOD PRESSURE: 136 MMHG | BODY MASS INDEX: 22.6 KG/M2 | TEMPERATURE: 97.8 F | HEART RATE: 67 BPM

## 2019-01-01 VITALS
RESPIRATION RATE: 18 BRPM | BODY MASS INDEX: 22.54 KG/M2 | WEIGHT: 143.6 LBS | HEART RATE: 61 BPM | HEIGHT: 67 IN | DIASTOLIC BLOOD PRESSURE: 81 MMHG | TEMPERATURE: 97.8 F | SYSTOLIC BLOOD PRESSURE: 173 MMHG

## 2019-01-01 VITALS
HEART RATE: 69 BPM | WEIGHT: 174 LBS | SYSTOLIC BLOOD PRESSURE: 140 MMHG | RESPIRATION RATE: 18 BRPM | BODY MASS INDEX: 27.31 KG/M2 | TEMPERATURE: 98.3 F | DIASTOLIC BLOOD PRESSURE: 55 MMHG | HEIGHT: 67 IN

## 2019-01-01 VITALS
WEIGHT: 126 LBS | TEMPERATURE: 97.7 F | HEART RATE: 108 BPM | BODY MASS INDEX: 19.78 KG/M2 | HEIGHT: 67 IN | SYSTOLIC BLOOD PRESSURE: 119 MMHG | RESPIRATION RATE: 18 BRPM | DIASTOLIC BLOOD PRESSURE: 76 MMHG

## 2019-01-01 VITALS
BODY MASS INDEX: 35.68 KG/M2 | HEIGHT: 58 IN | SYSTOLIC BLOOD PRESSURE: 97 MMHG | WEIGHT: 170 LBS | HEART RATE: 62 BPM | DIASTOLIC BLOOD PRESSURE: 45 MMHG | OXYGEN SATURATION: 95 %

## 2019-01-01 VITALS
BODY MASS INDEX: 26.53 KG/M2 | SYSTOLIC BLOOD PRESSURE: 140 MMHG | HEIGHT: 67 IN | TEMPERATURE: 97.8 F | WEIGHT: 169 LBS | HEART RATE: 108 BPM | DIASTOLIC BLOOD PRESSURE: 68 MMHG | RESPIRATION RATE: 18 BRPM

## 2019-01-01 VITALS
RESPIRATION RATE: 18 BRPM | TEMPERATURE: 97.5 F | DIASTOLIC BLOOD PRESSURE: 69 MMHG | OXYGEN SATURATION: 95 % | SYSTOLIC BLOOD PRESSURE: 150 MMHG | HEART RATE: 59 BPM | BODY MASS INDEX: 26.94 KG/M2 | WEIGHT: 172 LBS

## 2019-01-01 VITALS
WEIGHT: 133 LBS | SYSTOLIC BLOOD PRESSURE: 129 MMHG | HEIGHT: 67 IN | RESPIRATION RATE: 16 BRPM | DIASTOLIC BLOOD PRESSURE: 76 MMHG | BODY MASS INDEX: 20.88 KG/M2 | HEART RATE: 82 BPM

## 2019-01-01 VITALS
BODY MASS INDEX: 25.58 KG/M2 | HEIGHT: 67 IN | DIASTOLIC BLOOD PRESSURE: 58 MMHG | SYSTOLIC BLOOD PRESSURE: 144 MMHG | TEMPERATURE: 98.1 F | RESPIRATION RATE: 18 BRPM | WEIGHT: 163 LBS | HEART RATE: 66 BPM

## 2019-01-01 VITALS
HEIGHT: 67 IN | TEMPERATURE: 97.7 F | RESPIRATION RATE: 19 BRPM | DIASTOLIC BLOOD PRESSURE: 67 MMHG | OXYGEN SATURATION: 96 % | SYSTOLIC BLOOD PRESSURE: 147 MMHG | WEIGHT: 167 LBS | BODY MASS INDEX: 26.21 KG/M2 | HEART RATE: 67 BPM

## 2019-01-01 VITALS
HEIGHT: 67 IN | SYSTOLIC BLOOD PRESSURE: 119 MMHG | DIASTOLIC BLOOD PRESSURE: 65 MMHG | WEIGHT: 172 LBS | RESPIRATION RATE: 18 BRPM | BODY MASS INDEX: 27 KG/M2 | HEART RATE: 61 BPM | TEMPERATURE: 98.2 F

## 2019-01-01 VITALS
SYSTOLIC BLOOD PRESSURE: 122 MMHG | DIASTOLIC BLOOD PRESSURE: 64 MMHG | RESPIRATION RATE: 18 BRPM | HEIGHT: 67 IN | TEMPERATURE: 97.7 F | BODY MASS INDEX: 25.33 KG/M2 | HEART RATE: 65 BPM | WEIGHT: 161.4 LBS

## 2019-01-01 VITALS
BODY MASS INDEX: 27.03 KG/M2 | TEMPERATURE: 97.9 F | RESPIRATION RATE: 18 BRPM | DIASTOLIC BLOOD PRESSURE: 84 MMHG | HEIGHT: 67 IN | SYSTOLIC BLOOD PRESSURE: 165 MMHG | WEIGHT: 172.2 LBS | HEART RATE: 79 BPM

## 2019-01-01 VITALS
BODY MASS INDEX: 26.24 KG/M2 | HEART RATE: 58 BPM | TEMPERATURE: 97.7 F | WEIGHT: 167.2 LBS | HEIGHT: 67 IN | RESPIRATION RATE: 18 BRPM | DIASTOLIC BLOOD PRESSURE: 67 MMHG | SYSTOLIC BLOOD PRESSURE: 147 MMHG

## 2019-01-01 VITALS
RESPIRATION RATE: 20 BRPM | HEART RATE: 64 BPM | DIASTOLIC BLOOD PRESSURE: 61 MMHG | TEMPERATURE: 97.8 F | SYSTOLIC BLOOD PRESSURE: 107 MMHG | HEIGHT: 67 IN | BODY MASS INDEX: 27.31 KG/M2 | WEIGHT: 174 LBS

## 2019-01-01 VITALS
SYSTOLIC BLOOD PRESSURE: 168 MMHG | HEART RATE: 62 BPM | DIASTOLIC BLOOD PRESSURE: 77 MMHG | TEMPERATURE: 97.8 F | WEIGHT: 168 LBS | BODY MASS INDEX: 26.31 KG/M2

## 2019-01-01 VITALS
WEIGHT: 170 LBS | RESPIRATION RATE: 18 BRPM | SYSTOLIC BLOOD PRESSURE: 115 MMHG | TEMPERATURE: 97.5 F | HEART RATE: 69 BPM | HEIGHT: 67 IN | DIASTOLIC BLOOD PRESSURE: 67 MMHG | BODY MASS INDEX: 26.68 KG/M2

## 2019-01-01 VITALS
RESPIRATION RATE: 16 BRPM | TEMPERATURE: 98.1 F | SYSTOLIC BLOOD PRESSURE: 137 MMHG | HEART RATE: 63 BPM | OXYGEN SATURATION: 94 % | DIASTOLIC BLOOD PRESSURE: 62 MMHG

## 2019-01-01 VITALS
TEMPERATURE: 97.5 F | DIASTOLIC BLOOD PRESSURE: 77 MMHG | BODY MASS INDEX: 27.72 KG/M2 | SYSTOLIC BLOOD PRESSURE: 128 MMHG | WEIGHT: 177 LBS | HEART RATE: 59 BPM | RESPIRATION RATE: 18 BRPM

## 2019-01-01 VITALS
WEIGHT: 199.4 LBS | DIASTOLIC BLOOD PRESSURE: 62 MMHG | OXYGEN SATURATION: 97 % | DIASTOLIC BLOOD PRESSURE: 72 MMHG | HEART RATE: 60 BPM | HEART RATE: 60 BPM | WEIGHT: 201 LBS | DIASTOLIC BLOOD PRESSURE: 64 MMHG | HEIGHT: 58 IN | BODY MASS INDEX: 42.19 KG/M2 | SYSTOLIC BLOOD PRESSURE: 97 MMHG | BODY MASS INDEX: 41.98 KG/M2 | WEIGHT: 205.4 LBS | HEIGHT: 58 IN | WEIGHT: 200 LBS | SYSTOLIC BLOOD PRESSURE: 104 MMHG | SYSTOLIC BLOOD PRESSURE: 129 MMHG | BODY MASS INDEX: 41.86 KG/M2 | BODY MASS INDEX: 42.93 KG/M2 | HEART RATE: 68 BPM | WEIGHT: 206 LBS | BODY MASS INDEX: 43.24 KG/M2 | DIASTOLIC BLOOD PRESSURE: 59 MMHG | HEART RATE: 60 BPM | HEIGHT: 58 IN | SYSTOLIC BLOOD PRESSURE: 140 MMHG | HEART RATE: 60 BPM | SYSTOLIC BLOOD PRESSURE: 122 MMHG | OXYGEN SATURATION: 98 % | HEIGHT: 58 IN | DIASTOLIC BLOOD PRESSURE: 78 MMHG

## 2019-01-01 VITALS
TEMPERATURE: 97.5 F | DIASTOLIC BLOOD PRESSURE: 75 MMHG | HEIGHT: 67 IN | SYSTOLIC BLOOD PRESSURE: 125 MMHG | BODY MASS INDEX: 20.4 KG/M2 | RESPIRATION RATE: 18 BRPM | HEART RATE: 93 BPM | WEIGHT: 130 LBS

## 2019-01-01 VITALS
BODY MASS INDEX: 34.85 KG/M2 | WEIGHT: 166 LBS | DIASTOLIC BLOOD PRESSURE: 55 MMHG | SYSTOLIC BLOOD PRESSURE: 103 MMHG | OXYGEN SATURATION: 92 % | HEIGHT: 58 IN | HEART RATE: 68 BPM

## 2019-01-01 VITALS
TEMPERATURE: 97.9 F | SYSTOLIC BLOOD PRESSURE: 171 MMHG | HEART RATE: 60 BPM | DIASTOLIC BLOOD PRESSURE: 79 MMHG | BODY MASS INDEX: 24.33 KG/M2 | WEIGHT: 155 LBS | HEIGHT: 67 IN | RESPIRATION RATE: 18 BRPM

## 2019-01-01 VITALS
TEMPERATURE: 98.3 F | HEART RATE: 59 BPM | RESPIRATION RATE: 20 BRPM | WEIGHT: 174 LBS | DIASTOLIC BLOOD PRESSURE: 58 MMHG | HEIGHT: 67 IN | BODY MASS INDEX: 27.31 KG/M2 | SYSTOLIC BLOOD PRESSURE: 101 MMHG

## 2019-01-01 VITALS — WEIGHT: 170 LBS | HEIGHT: 67 IN | BODY MASS INDEX: 26.68 KG/M2

## 2019-01-01 VITALS
WEIGHT: 172.2 LBS | TEMPERATURE: 97.8 F | SYSTOLIC BLOOD PRESSURE: 117 MMHG | BODY MASS INDEX: 26.96 KG/M2 | DIASTOLIC BLOOD PRESSURE: 63 MMHG | HEART RATE: 59 BPM

## 2019-01-01 VITALS
WEIGHT: 123.02 LBS | SYSTOLIC BLOOD PRESSURE: 129 MMHG | HEART RATE: 73 BPM | BODY MASS INDEX: 19.31 KG/M2 | HEIGHT: 67 IN | OXYGEN SATURATION: 97 % | RESPIRATION RATE: 18 BRPM | TEMPERATURE: 97.9 F | DIASTOLIC BLOOD PRESSURE: 76 MMHG

## 2019-01-01 VITALS
RESPIRATION RATE: 18 BRPM | WEIGHT: 122 LBS | HEART RATE: 74 BPM | HEIGHT: 67 IN | DIASTOLIC BLOOD PRESSURE: 69 MMHG | BODY MASS INDEX: 19.15 KG/M2 | TEMPERATURE: 97.6 F | SYSTOLIC BLOOD PRESSURE: 116 MMHG

## 2019-01-01 VITALS — HEIGHT: 67 IN | WEIGHT: 178 LBS | BODY MASS INDEX: 27.94 KG/M2

## 2019-01-01 VITALS
DIASTOLIC BLOOD PRESSURE: 69 MMHG | OXYGEN SATURATION: 91 % | TEMPERATURE: 98.1 F | HEIGHT: 67 IN | HEART RATE: 61 BPM | BODY MASS INDEX: 26.12 KG/M2 | SYSTOLIC BLOOD PRESSURE: 162 MMHG | WEIGHT: 166.4 LBS

## 2019-01-01 VITALS
DIASTOLIC BLOOD PRESSURE: 68 MMHG | WEIGHT: 179 LBS | HEIGHT: 67 IN | BODY MASS INDEX: 28.09 KG/M2 | HEART RATE: 67 BPM | SYSTOLIC BLOOD PRESSURE: 124 MMHG

## 2019-01-01 VITALS
WEIGHT: 146 LBS | RESPIRATION RATE: 18 BRPM | TEMPERATURE: 97.6 F | SYSTOLIC BLOOD PRESSURE: 192 MMHG | BODY MASS INDEX: 22.91 KG/M2 | DIASTOLIC BLOOD PRESSURE: 90 MMHG | HEIGHT: 67 IN | HEART RATE: 60 BPM

## 2019-01-01 DIAGNOSIS — N18.9 ACUTE ON CHRONIC RENAL INSUFFICIENCY: ICD-10-CM

## 2019-01-01 DIAGNOSIS — R80.9 PROTEINURIA, UNSPECIFIED TYPE: ICD-10-CM

## 2019-01-01 DIAGNOSIS — D64.81 ANEMIA DUE TO ANTINEOPLASTIC CHEMOTHERAPY: ICD-10-CM

## 2019-01-01 DIAGNOSIS — R19.7 DIARRHEA, UNSPECIFIED TYPE: ICD-10-CM

## 2019-01-01 DIAGNOSIS — C64.9 RENAL CELL CARCINOMA, UNSPECIFIED LATERALITY (HCC): ICD-10-CM

## 2019-01-01 DIAGNOSIS — E83.42 HYPOMAGNESEMIA: ICD-10-CM

## 2019-01-01 DIAGNOSIS — C64.2 RENAL CELL CARCINOMA OF LEFT KIDNEY (HCC): ICD-10-CM

## 2019-01-01 DIAGNOSIS — D50.8 IRON DEFICIENCY ANEMIA SECONDARY TO INADEQUATE DIETARY IRON INTAKE: Primary | ICD-10-CM

## 2019-01-01 DIAGNOSIS — C79.51 BONE METASTASIS: ICD-10-CM

## 2019-01-01 DIAGNOSIS — E83.51 HYPOCALCEMIA: Primary | ICD-10-CM

## 2019-01-01 DIAGNOSIS — T45.1X5A ANEMIA DUE TO ANTINEOPLASTIC CHEMOTHERAPY: ICD-10-CM

## 2019-01-01 DIAGNOSIS — E11.8 DM COMPLICATION (HCC): ICD-10-CM

## 2019-01-01 DIAGNOSIS — E86.0 DEHYDRATION: ICD-10-CM

## 2019-01-01 DIAGNOSIS — D64.9 ANEMIA: ICD-10-CM

## 2019-01-01 DIAGNOSIS — I10 HYPERTENSION, ESSENTIAL: ICD-10-CM

## 2019-01-01 DIAGNOSIS — R80.8 OTHER PROTEINURIA: Primary | ICD-10-CM

## 2019-01-01 DIAGNOSIS — R11.2 NAUSEA WITH VOMITING: ICD-10-CM

## 2019-01-01 DIAGNOSIS — N18.9 ANEMIA OF CHRONIC KIDNEY FAILURE, UNSPECIFIED STAGE: ICD-10-CM

## 2019-01-01 DIAGNOSIS — D50.8 IRON DEFICIENCY ANEMIA SECONDARY TO INADEQUATE DIETARY IRON INTAKE: ICD-10-CM

## 2019-01-01 DIAGNOSIS — R60.0 LOCALIZED EDEMA: Primary | ICD-10-CM

## 2019-01-01 DIAGNOSIS — D64.81 ANEMIA DUE TO ANTINEOPLASTIC CHEMOTHERAPY: Primary | ICD-10-CM

## 2019-01-01 DIAGNOSIS — D63.1 ANEMIA OF CHRONIC KIDNEY FAILURE, UNSPECIFIED STAGE: ICD-10-CM

## 2019-01-01 DIAGNOSIS — C64.2 RENAL CELL CARCINOMA OF LEFT KIDNEY (HCC): Primary | ICD-10-CM

## 2019-01-01 DIAGNOSIS — Z95.0 PRESENCE OF CARDIAC PACEMAKER: ICD-10-CM

## 2019-01-01 DIAGNOSIS — C64.9 RENAL CELL CARCINOMA, UNSPECIFIED LATERALITY (HCC): Primary | ICD-10-CM

## 2019-01-01 DIAGNOSIS — N18.30 CHRONIC KIDNEY DISEASE, STAGE III (MODERATE) (HCC): Primary | ICD-10-CM

## 2019-01-01 DIAGNOSIS — T45.1X5A ANEMIA DUE TO ANTINEOPLASTIC CHEMOTHERAPY: Primary | ICD-10-CM

## 2019-01-01 DIAGNOSIS — C79.51 OSSEOUS METASTASIS: ICD-10-CM

## 2019-01-01 DIAGNOSIS — N28.9 RENAL INSUFFICIENCY: ICD-10-CM

## 2019-01-01 DIAGNOSIS — I49.5 SICK SINUS SYNDROME (HCC): Primary | ICD-10-CM

## 2019-01-01 DIAGNOSIS — N18.30 CHRONIC KIDNEY DISEASE, STAGE III (MODERATE) (HCC): ICD-10-CM

## 2019-01-01 DIAGNOSIS — E83.51 HYPOCALCEMIA: ICD-10-CM

## 2019-01-01 DIAGNOSIS — E83.42 HYPOMAGNESEMIA: Primary | ICD-10-CM

## 2019-01-01 DIAGNOSIS — R31.9 HEMATURIA, UNSPECIFIED TYPE: Primary | ICD-10-CM

## 2019-01-01 DIAGNOSIS — R60.0 EDEMA OF UPPER EXTREMITY: Primary | ICD-10-CM

## 2019-01-01 DIAGNOSIS — E86.0 DEHYDRATION: Primary | ICD-10-CM

## 2019-01-01 DIAGNOSIS — E11.8 DISORDER ASSOCIATED WITH TYPE 2 DIABETES MELLITUS (HCC): ICD-10-CM

## 2019-01-01 DIAGNOSIS — E83.39 HYPOPHOSPHATEMIA: Primary | ICD-10-CM

## 2019-01-01 DIAGNOSIS — R13.10 DYSPHAGIA: ICD-10-CM

## 2019-01-01 DIAGNOSIS — N28.9 ACUTE ON CHRONIC RENAL INSUFFICIENCY: ICD-10-CM

## 2019-01-01 DIAGNOSIS — C79.51 BONE METASTASIS: Primary | ICD-10-CM

## 2019-01-01 DIAGNOSIS — D50.0 IRON DEFICIENCY ANEMIA DUE TO CHRONIC BLOOD LOSS: ICD-10-CM

## 2019-01-01 DIAGNOSIS — R60.0 EDEMA OF UPPER EXTREMITY: ICD-10-CM

## 2019-01-01 DIAGNOSIS — K90.9 IRON MALABSORPTION: ICD-10-CM

## 2019-01-01 DIAGNOSIS — C79.51 OSSEOUS METASTASIS: Primary | ICD-10-CM

## 2019-01-01 DIAGNOSIS — R60.0 LOCALIZED EDEMA: ICD-10-CM

## 2019-01-01 DIAGNOSIS — E55.9 VITAMIN D DEFICIENCY, UNSPECIFIED: ICD-10-CM

## 2019-01-01 DIAGNOSIS — R93.6 ABNORMAL FINDINGS ON DIAGNOSTIC IMAGING OF LIMBS: Primary | ICD-10-CM

## 2019-01-01 DIAGNOSIS — K90.9 IRON MALABSORPTION: Primary | ICD-10-CM

## 2019-01-01 DIAGNOSIS — R19.7 DIARRHEA, UNSPECIFIED TYPE: Primary | ICD-10-CM

## 2019-01-01 DIAGNOSIS — I25.10 CORONARY ARTERY DISEASE INVOLVING NATIVE CORONARY ARTERY OF NATIVE HEART WITHOUT ANGINA PECTORIS: ICD-10-CM

## 2019-01-01 DIAGNOSIS — I10 ESSENTIAL HYPERTENSION: ICD-10-CM

## 2019-01-01 DIAGNOSIS — R06.09 DYSPNEA ON EXERTION: ICD-10-CM

## 2019-01-01 DIAGNOSIS — N17.9 ACUTE RENAL FAILURE, UNSPECIFIED ACUTE RENAL FAILURE TYPE (HCC): Primary | ICD-10-CM

## 2019-01-01 DIAGNOSIS — N17.9 ACUTE RENAL FAILURE, UNSPECIFIED ACUTE RENAL FAILURE TYPE (HCC): ICD-10-CM

## 2019-01-01 DIAGNOSIS — K25.3 ACUTE GASTRIC ULCER WITHOUT HEMORRHAGE OR PERFORATION: ICD-10-CM

## 2019-01-01 DIAGNOSIS — R31.9 HEMATURIA, UNSPECIFIED TYPE: ICD-10-CM

## 2019-01-01 DIAGNOSIS — I89.0 LYMPHEDEMA OF LEFT ARM: Primary | ICD-10-CM

## 2019-01-01 LAB
1,25(OH)2D3 SERPL-MCNC: 172 PG/ML (ref 19.9–79.3)
25(OH)D3 SERPL-MCNC: 29.9 NG/ML (ref 30–100)
25(OH)D3 SERPL-MCNC: 30 NG/ML (ref 30–100)
ALBUMIN SERPL-MCNC: 2.8 G/DL (ref 3.5–5.2)
ALBUMIN SERPL-MCNC: 2.9 G/DL (ref 3.5–4.8)
ALBUMIN SERPL-MCNC: 2.9 G/DL (ref 3.5–4.8)
ALBUMIN SERPL-MCNC: 2.9 G/DL (ref 3.5–5.2)
ALBUMIN SERPL-MCNC: 2.9 G/DL (ref 3.5–5.2)
ALBUMIN SERPL-MCNC: 3 G/DL (ref 3.5–4.8)
ALBUMIN SERPL-MCNC: 3 G/DL (ref 3.5–5.2)
ALBUMIN SERPL-MCNC: 3.1 G/DL (ref 3.5–4.8)
ALBUMIN SERPL-MCNC: 3.2 G/DL (ref 3.5–4.8)
ALBUMIN SERPL-MCNC: 3.3 G/DL (ref 3.5–4.8)
ALBUMIN SERPL-MCNC: 3.4 G/DL (ref 3.5–4.8)
ALBUMIN SERPL-MCNC: 3.5 G/DL (ref 3.5–4.8)
ALBUMIN SERPL-MCNC: 3.6 G/DL (ref 3.5–4.8)
ALBUMIN SERPL-MCNC: 3.6 G/DL (ref 3.5–5.2)
ALBUMIN SERPL-MCNC: 3.7 G/DL (ref 3.5–5.2)
ALBUMIN SERPL-MCNC: 3.9 G/DL (ref 3.5–5.2)
ALBUMIN/GLOB SERPL: 0.9 {RATIO} (ref 1–1.7)
ALBUMIN/GLOB SERPL: 1 G/DL (ref 1–1.7)
ALBUMIN/GLOB SERPL: 1 G/DL (ref 1–1.7)
ALBUMIN/GLOB SERPL: 1 {RATIO} (ref 1–1.7)
ALBUMIN/GLOB SERPL: 1.1 G/DL (ref 1–1.7)
ALBUMIN/GLOB SERPL: 1.1 {RATIO} (ref 1–1.7)
ALBUMIN/GLOB SERPL: 1.1 {RATIO} (ref 1–1.7)
ALBUMIN/GLOB SERPL: 1.2 G/DL (ref 1–1.7)
ALBUMIN/GLOB SERPL: 1.2 {RATIO} (ref 1–1.7)
ALBUMIN/GLOB SERPL: 1.3 G/DL (ref 1–1.7)
ALBUMIN/GLOB SERPL: 1.4 G/DL
ALBUMIN/GLOB SERPL: 1.4 G/DL
ALBUMIN/GLOB SERPL: 1.4 G/DL (ref 1–1.7)
ALBUMIN/GLOB SERPL: 1.5 G/DL
ALBUMIN/GLOB SERPL: 1.6 G/DL
ALBUMIN/GLOB SERPL: 1.7 G/DL
ALP SERPL-CCNC: 102 U/L (ref 39–117)
ALP SERPL-CCNC: 170 U/L (ref 39–117)
ALP SERPL-CCNC: 31 U/L (ref 32–91)
ALP SERPL-CCNC: 34 U/L (ref 32–91)
ALP SERPL-CCNC: 35 U/L (ref 32–91)
ALP SERPL-CCNC: 35 U/L (ref 32–91)
ALP SERPL-CCNC: 36 IU/L (ref 32–91)
ALP SERPL-CCNC: 36 U/L (ref 32–91)
ALP SERPL-CCNC: 37 IU/L (ref 32–91)
ALP SERPL-CCNC: 37 U/L (ref 32–91)
ALP SERPL-CCNC: 43 IU/L (ref 32–91)
ALP SERPL-CCNC: 48 U/L (ref 32–91)
ALP SERPL-CCNC: 50 U/L (ref 32–91)
ALP SERPL-CCNC: 55 IU/L (ref 32–91)
ALP SERPL-CCNC: 56 U/L (ref 32–91)
ALP SERPL-CCNC: 61 IU/L (ref 32–91)
ALP SERPL-CCNC: 61 U/L (ref 32–91)
ALP SERPL-CCNC: 63 U/L (ref 32–91)
ALP SERPL-CCNC: 65 U/L (ref 39–117)
ALP SERPL-CCNC: 66 IU/L (ref 32–91)
ALP SERPL-CCNC: 66 U/L (ref 39–117)
ALP SERPL-CCNC: 67 U/L (ref 32–91)
ALP SERPL-CCNC: 81 IU/L (ref 32–91)
ALP SERPL-CCNC: 84 U/L (ref 39–117)
ALT SERPL W P-5'-P-CCNC: 10 U/L (ref 14–54)
ALT SERPL W P-5'-P-CCNC: 11 U/L (ref 14–54)
ALT SERPL W P-5'-P-CCNC: 12 U/L (ref 14–54)
ALT SERPL W P-5'-P-CCNC: 13 U/L (ref 14–54)
ALT SERPL W P-5'-P-CCNC: 14 U/L (ref 14–54)
ALT SERPL W P-5'-P-CCNC: 14 U/L (ref 14–54)
ALT SERPL W P-5'-P-CCNC: 231 U/L (ref 1–33)
ALT SERPL W P-5'-P-CCNC: 24 U/L (ref 1–33)
ALT SERPL W P-5'-P-CCNC: 25 U/L (ref 1–33)
ALT SERPL W P-5'-P-CCNC: 63 U/L (ref 1–33)
ALT SERPL W P-5'-P-CCNC: 70 U/L (ref 1–33)
ALT SERPL W P-5'-P-CCNC: 8 U/L (ref 14–54)
ALT SERPL-CCNC: 10 IU/L (ref 14–54)
ALT SERPL-CCNC: 11 IU/L (ref 14–54)
ALT SERPL-CCNC: 11 IU/L (ref 14–54)
ALT SERPL-CCNC: 12 IU/L (ref 14–54)
ALT SERPL-CCNC: 13 IU/L (ref 14–54)
ALT SERPL-CCNC: 13 IU/L (ref 14–54)
ALT SERPL-CCNC: 17 IU/L (ref 14–54)
ANION GAP SERPL CALC-SCNC: 10.8 MMOL/L (ref 10–20)
ANION GAP SERPL CALC-SCNC: 12.7 MMOL/L (ref 10–20)
ANION GAP SERPL CALC-SCNC: 15.4 MMOL/L (ref 10–20)
ANION GAP SERPL CALC-SCNC: 16.9 MMOL/L (ref 10–20)
ANION GAP SERPL CALC-SCNC: 17 MMOL/L (ref 10–20)
ANION GAP SERPL CALC-SCNC: 17.9 MMOL/L (ref 10–20)
ANION GAP SERPL CALC-SCNC: 18.7 MMOL/L (ref 10–20)
ANION GAP SERPL CALCULATED.3IONS-SCNC: 11 MMOL/L (ref 10–20)
ANION GAP SERPL CALCULATED.3IONS-SCNC: 11 MMOL/L (ref 5–15)
ANION GAP SERPL CALCULATED.3IONS-SCNC: 11.1 MMOL/L (ref 5–15)
ANION GAP SERPL CALCULATED.3IONS-SCNC: 12.3 MMOL/L (ref 5–15)
ANION GAP SERPL CALCULATED.3IONS-SCNC: 12.3 MMOL/L (ref 5–15)
ANION GAP SERPL CALCULATED.3IONS-SCNC: 13 MMOL/L (ref 5–15)
ANION GAP SERPL CALCULATED.3IONS-SCNC: 13.1 MMOL/L (ref 5–15)
ANION GAP SERPL CALCULATED.3IONS-SCNC: 13.3 MMOL/L (ref 10–20)
ANION GAP SERPL CALCULATED.3IONS-SCNC: 13.3 MMOL/L (ref 5–15)
ANION GAP SERPL CALCULATED.3IONS-SCNC: 13.3 MMOL/L (ref 5–15)
ANION GAP SERPL CALCULATED.3IONS-SCNC: 13.4 MMOL/L (ref 5–15)
ANION GAP SERPL CALCULATED.3IONS-SCNC: 13.7 MMOL/L (ref 5–15)
ANION GAP SERPL CALCULATED.3IONS-SCNC: 14.1 MMOL/L (ref 5–15)
ANION GAP SERPL CALCULATED.3IONS-SCNC: 14.2 MMOL/L (ref 5–15)
ANION GAP SERPL CALCULATED.3IONS-SCNC: 14.5 MMOL/L (ref 5–15)
ANION GAP SERPL CALCULATED.3IONS-SCNC: 15.4 MMOL/L (ref 5–15)
ANION GAP SERPL CALCULATED.3IONS-SCNC: 16 MMOL/L (ref 5–15)
ANION GAP SERPL CALCULATED.3IONS-SCNC: 7 MMOL/L (ref 5–15)
ANION GAP SERPL CALCULATED.3IONS-SCNC: 8 MMOL/L (ref 10–20)
ANION GAP SERPL CALCULATED.3IONS-SCNC: 8 MMOL/L (ref 10–20)
ANION GAP SERPL CALCULATED.3IONS-SCNC: 8 MMOL/L (ref 5–15)
ANION GAP SERPL CALCULATED.3IONS-SCNC: 9 MMOL/L (ref 5–15)
AST SERPL-CCNC: 19 IU/L (ref 15–41)
AST SERPL-CCNC: 19 U/L (ref 15–41)
AST SERPL-CCNC: 19 U/L (ref 15–41)
AST SERPL-CCNC: 20 IU/L (ref 15–41)
AST SERPL-CCNC: 20 IU/L (ref 15–41)
AST SERPL-CCNC: 20 U/L (ref 15–41)
AST SERPL-CCNC: 21 IU/L (ref 15–41)
AST SERPL-CCNC: 21 U/L (ref 15–41)
AST SERPL-CCNC: 21 U/L (ref 15–41)
AST SERPL-CCNC: 210 U/L (ref 1–32)
AST SERPL-CCNC: 22 U/L (ref 15–41)
AST SERPL-CCNC: 22 U/L (ref 15–41)
AST SERPL-CCNC: 23 U/L (ref 15–41)
AST SERPL-CCNC: 23 U/L (ref 15–41)
AST SERPL-CCNC: 25 U/L (ref 15–41)
AST SERPL-CCNC: 26 IU/L (ref 15–41)
AST SERPL-CCNC: 26 U/L (ref 15–41)
AST SERPL-CCNC: 28 U/L (ref 15–41)
AST SERPL-CCNC: 33 U/L (ref 1–32)
AST SERPL-CCNC: 38 U/L (ref 1–32)
AST SERPL-CCNC: 43 U/L (ref 1–32)
AST SERPL-CCNC: 43 U/L (ref 1–32)
AST SERPL-CCNC: 44 U/L (ref 1–32)
AST SERPL-CCNC: 50 U/L (ref 1–32)
BACTERIA UR QL AUTO: ABNORMAL /HPF
BACTERIA UR QL AUTO: NORMAL /HPF
BASOPHILS # BLD AUTO: 0 10*3/MM3 (ref 0–0.2)
BASOPHILS # BLD AUTO: 0 10*3/MM3 (ref 0–0.2)
BASOPHILS # BLD AUTO: 0.01 10*3/MM3 (ref 0–0.2)
BASOPHILS # BLD AUTO: 0.02 10*3/MM3 (ref 0–0.2)
BASOPHILS # BLD AUTO: 0.03 10*3/MM3 (ref 0–0.2)
BASOPHILS # BLD AUTO: 0.04 10*3/MM3 (ref 0–0.2)
BASOPHILS # BLD AUTO: 0.06 10*3/MM3 (ref 0–0.2)
BASOPHILS # BLD AUTO: 0.08 10*3/MM3 (ref 0–0.2)
BASOPHILS # BLD AUTO: 0.08 10*3/MM3 (ref 0–0.2)
BASOPHILS # BLD AUTO: 0.1 10*3/UL (ref 0–0.2)
BASOPHILS NFR BLD AUTO: 0.1 % (ref 0–1.5)
BASOPHILS NFR BLD AUTO: 0.2 % (ref 0–1.5)
BASOPHILS NFR BLD AUTO: 0.3 % (ref 0–1.5)
BASOPHILS NFR BLD AUTO: 0.4 % (ref 0–1.5)
BASOPHILS NFR BLD AUTO: 0.5 % (ref 0–1.5)
BASOPHILS NFR BLD AUTO: 0.6 % (ref 0–1.5)
BASOPHILS NFR BLD AUTO: 0.6 % (ref 0–1.5)
BASOPHILS NFR BLD AUTO: 0.7 % (ref 0–1.5)
BASOPHILS NFR BLD AUTO: 0.9 % (ref 0–1.5)
BASOPHILS NFR BLD AUTO: 1 % (ref 0–1.5)
BASOPHILS NFR BLD AUTO: 1 % (ref 0–1.5)
BASOPHILS NFR BLD AUTO: 1 % (ref 0–2)
BH CV UPPER VENOUS LEFT AXILLARY AUGMENT: NORMAL
BH CV UPPER VENOUS LEFT AXILLARY COMPETENT: NORMAL
BH CV UPPER VENOUS LEFT AXILLARY COMPRESS: NORMAL
BH CV UPPER VENOUS LEFT AXILLARY PHASIC: NORMAL
BH CV UPPER VENOUS LEFT AXILLARY SPONT: NORMAL
BH CV UPPER VENOUS LEFT BASILIC FOREARM COMPRESS: NORMAL
BH CV UPPER VENOUS LEFT BASILIC UPPER COMPRESS: NORMAL
BH CV UPPER VENOUS LEFT BRACHIAL COMPRESS: NORMAL
BH CV UPPER VENOUS LEFT CEPHALIC FOREARM COMPRESS: NORMAL
BH CV UPPER VENOUS LEFT CEPHALIC UPPER COMPRESS: NORMAL
BH CV UPPER VENOUS LEFT INTERNAL JUGULAR AUGMENT: NORMAL
BH CV UPPER VENOUS LEFT INTERNAL JUGULAR COMPETENT: NORMAL
BH CV UPPER VENOUS LEFT INTERNAL JUGULAR COMPRESS: NORMAL
BH CV UPPER VENOUS LEFT INTERNAL JUGULAR PHASIC: NORMAL
BH CV UPPER VENOUS LEFT INTERNAL JUGULAR SPONT: NORMAL
BH CV UPPER VENOUS LEFT RADIAL COMPRESS: NORMAL
BH CV UPPER VENOUS LEFT SUBCLAVIAN AUGMENT: NORMAL
BH CV UPPER VENOUS LEFT SUBCLAVIAN COMPETENT: NORMAL
BH CV UPPER VENOUS LEFT SUBCLAVIAN PHASIC: NORMAL
BH CV UPPER VENOUS LEFT SUBCLAVIAN SPONT: NORMAL
BH CV UPPER VENOUS LEFT ULNAR COMPRESS: NORMAL
BH CV UPPER VENOUS RIGHT INTERNAL JUGULAR AUGMENT: NORMAL
BH CV UPPER VENOUS RIGHT INTERNAL JUGULAR COMPETENT: NORMAL
BH CV UPPER VENOUS RIGHT INTERNAL JUGULAR COMPRESS: NORMAL
BH CV UPPER VENOUS RIGHT INTERNAL JUGULAR PHASIC: NORMAL
BH CV UPPER VENOUS RIGHT INTERNAL JUGULAR SPONT: NORMAL
BH CV UPPER VENOUS RIGHT SUBCLAVIAN AUGMENT: NORMAL
BH CV UPPER VENOUS RIGHT SUBCLAVIAN COMPETENT: NORMAL
BH CV UPPER VENOUS RIGHT SUBCLAVIAN PHASIC: NORMAL
BH CV UPPER VENOUS RIGHT SUBCLAVIAN SPONT: NORMAL
BILIRUB SERPL-MCNC: 0.3 MG/DL (ref 0.2–1.2)
BILIRUB SERPL-MCNC: 0.3 MG/DL (ref 0.3–1.2)
BILIRUB SERPL-MCNC: 0.4 MG/DL (ref 0.2–1.2)
BILIRUB SERPL-MCNC: 0.4 MG/DL (ref 0.3–1.2)
BILIRUB SERPL-MCNC: 0.5 MG/DL (ref 0.3–1.2)
BILIRUB SERPL-MCNC: 0.6 MG/DL (ref 0.2–1.2)
BILIRUB SERPL-MCNC: 0.6 MG/DL (ref 0.3–1.2)
BILIRUB SERPL-MCNC: 0.6 MG/DL (ref 0.3–1.2)
BILIRUB SERPL-MCNC: 0.7 MG/DL (ref 0.3–1.2)
BILIRUB SERPL-MCNC: 0.8 MG/DL (ref 0.3–1.2)
BILIRUB SERPL-MCNC: 0.9 MG/DL (ref 0.3–1.2)
BILIRUB SERPL-MCNC: 0.9 MG/DL (ref 0.3–1.2)
BILIRUB SERPL-MCNC: 1 MG/DL (ref 0.2–1.2)
BILIRUB SERPL-MCNC: 2.2 MG/DL (ref 0.2–1.2)
BILIRUB UR QL STRIP: ABNORMAL
BILIRUB UR QL STRIP: NEGATIVE
BLADDER EPITHELIAL: NORMAL /LPF
BUN BLD-MCNC: 11 MG/DL (ref 8–20)
BUN BLD-MCNC: 13 MG/DL (ref 8–20)
BUN BLD-MCNC: 14 MG/DL (ref 8–20)
BUN BLD-MCNC: 15 MG/DL (ref 8–20)
BUN BLD-MCNC: 15 MG/DL (ref 8–20)
BUN BLD-MCNC: 16 MG/DL (ref 8–23)
BUN BLD-MCNC: 17 MG/DL (ref 8–20)
BUN BLD-MCNC: 17 MG/DL (ref 8–20)
BUN BLD-MCNC: 18 MG/DL (ref 8–20)
BUN BLD-MCNC: 19 MG/DL (ref 8–20)
BUN BLD-MCNC: 19 MG/DL (ref 8–20)
BUN BLD-MCNC: 19 MG/DL (ref 8–23)
BUN BLD-MCNC: 21 MG/DL (ref 8–23)
BUN BLD-MCNC: 22 MG/DL (ref 8–20)
BUN BLD-MCNC: 24 MG/DL (ref 8–23)
BUN BLD-MCNC: 25 MG/DL (ref 8–20)
BUN BLD-MCNC: 25 MG/DL (ref 8–23)
BUN BLD-MCNC: 25 MG/DL (ref 8–23)
BUN BLD-MCNC: 26 MG/DL (ref 8–20)
BUN BLD-MCNC: 27 MG/DL (ref 8–20)
BUN BLD-MCNC: 29 MG/DL (ref 8–23)
BUN BLD-MCNC: 31 MG/DL (ref 8–23)
BUN BLD-MCNC: 74 MG/DL (ref 8–23)
BUN SERPL-MCNC: 17 MG/DL (ref 8–20)
BUN SERPL-MCNC: 19 MG/DL (ref 8–20)
BUN SERPL-MCNC: 19 MG/DL (ref 8–20)
BUN SERPL-MCNC: 22 MG/DL (ref 8–20)
BUN SERPL-MCNC: 24 MG/DL (ref 8–20)
BUN SERPL-MCNC: 26 MG/DL (ref 8–20)
BUN SERPL-MCNC: 26 MG/DL (ref 8–20)
BUN SERPL-MCNC: 36 MG/DL (ref 8–20)
BUN/CREAT SERPL: 10 (ref 5.4–26.2)
BUN/CREAT SERPL: 10.6 (ref 5.4–26.2)
BUN/CREAT SERPL: 10.8 (ref 5.4–26.2)
BUN/CREAT SERPL: 11.4 (ref 5.4–26.2)
BUN/CREAT SERPL: 11.7 (ref 5.4–26.2)
BUN/CREAT SERPL: 11.8 (ref 5.4–26.2)
BUN/CREAT SERPL: 11.9 (ref 5.4–26.2)
BUN/CREAT SERPL: 12.5 (ref 5.4–26.2)
BUN/CREAT SERPL: 12.7 (ref 5.4–26.2)
BUN/CREAT SERPL: 12.9 (ref 5.4–26.2)
BUN/CREAT SERPL: 13 (ref 5.4–26.2)
BUN/CREAT SERPL: 13.8 (ref 5.4–26.2)
BUN/CREAT SERPL: 13.8 (ref 5.4–26.2)
BUN/CREAT SERPL: 14.4 (ref 5.4–26.2)
BUN/CREAT SERPL: 14.6 (ref 5.4–26.2)
BUN/CREAT SERPL: 15 (ref 5.4–26.2)
BUN/CREAT SERPL: 15.8 (ref 5.4–26.2)
BUN/CREAT SERPL: 19.2 (ref 7–25)
BUN/CREAT SERPL: 20.5 (ref 7–25)
BUN/CREAT SERPL: 21.6 (ref 7–25)
BUN/CREAT SERPL: 26.6 (ref 7–25)
BUN/CREAT SERPL: 27.2 (ref 7–25)
BUN/CREAT SERPL: 28.4 (ref 7–25)
BUN/CREAT SERPL: 28.9 (ref 7–25)
BUN/CREAT SERPL: 30.2 (ref 7–25)
BUN/CREAT SERPL: 40.2 (ref 7–25)
BUN/CREAT SERPL: 7.9 (ref 5.4–26.2)
BUN/CREAT SERPL: 8.5 (ref 5.4–26.2)
BUN/CREAT SERPL: 8.6 (ref 5.4–26.2)
BUN/CREAT SERPL: 8.9 (ref 5.4–26.2)
BUN/CREAT SERPL: 9.2 (ref 5.4–26.2)
C DIFF GDH STL QL: NEGATIVE
CA-I SERPL ISE-MCNC: 1.01 MMOL/L (ref 1.2–1.3)
CA-I SERPL ISE-MCNC: 1.29 MMOL/L (ref 1.2–1.3)
CALCIUM SERPL-MCNC: 7.3 MG/DL (ref 8.9–10.3)
CALCIUM SERPL-MCNC: 7.7 MG/DL (ref 8.9–10.3)
CALCIUM SERPL-MCNC: 8.5 MG/DL (ref 8.9–10.3)
CALCIUM SERPL-MCNC: 8.6 MG/DL (ref 8.9–10.3)
CALCIUM SERPL-MCNC: 8.8 MG/DL (ref 8.9–10.3)
CALCIUM SERPL-MCNC: 9.1 MG/DL (ref 8.9–10.3)
CALCIUM SERPL-MCNC: 9.2 MG/DL (ref 8.9–10.3)
CALCIUM SPEC-SCNC: 5.8 MG/DL (ref 8.6–10.5)
CALCIUM SPEC-SCNC: 6.3 MG/DL (ref 8.6–10.5)
CALCIUM SPEC-SCNC: 6.4 MG/DL (ref 8.6–10.5)
CALCIUM SPEC-SCNC: 6.9 MG/DL (ref 8.6–10.5)
CALCIUM SPEC-SCNC: 6.9 MG/DL (ref 8.9–10.3)
CALCIUM SPEC-SCNC: 6.9 MG/DL (ref 8.9–10.3)
CALCIUM SPEC-SCNC: 7 MG/DL (ref 8.9–10.3)
CALCIUM SPEC-SCNC: 7.2 MG/DL (ref 8.9–10.3)
CALCIUM SPEC-SCNC: 7.3 MG/DL (ref 8.6–10.5)
CALCIUM SPEC-SCNC: 7.3 MG/DL (ref 8.9–10.3)
CALCIUM SPEC-SCNC: 7.5 MG/DL (ref 8.6–10.5)
CALCIUM SPEC-SCNC: 7.5 MG/DL (ref 8.9–10.3)
CALCIUM SPEC-SCNC: 7.7 MG/DL (ref 8.9–10.3)
CALCIUM SPEC-SCNC: 7.9 MG/DL (ref 8.9–10.3)
CALCIUM SPEC-SCNC: 8.1 MG/DL (ref 8.6–10.5)
CALCIUM SPEC-SCNC: 8.2 MG/DL (ref 8.6–10.5)
CALCIUM SPEC-SCNC: 8.2 MG/DL (ref 8.9–10.3)
CALCIUM SPEC-SCNC: 8.5 MG/DL (ref 8.9–10.3)
CALCIUM SPEC-SCNC: 8.8 MG/DL (ref 8.6–10.5)
CALCIUM SPEC-SCNC: 8.8 MG/DL (ref 8.9–10.3)
CALCIUM SPEC-SCNC: 8.8 MG/DL (ref 8.9–10.3)
CALCIUM SPEC-SCNC: 9 MG/DL (ref 8.9–10.3)
CALCIUM SPEC-SCNC: 9 MG/DL (ref 8.9–10.3)
CALCIUM SPEC-SCNC: 9.3 MG/DL (ref 8.9–10.3)
CHLORIDE SERPL-SCNC: 101 MMOL/L (ref 101–111)
CHLORIDE SERPL-SCNC: 102 MMOL/L (ref 101–111)
CHLORIDE SERPL-SCNC: 104 MMOL/L (ref 101–111)
CHLORIDE SERPL-SCNC: 105 MMOL/L (ref 101–111)
CHLORIDE SERPL-SCNC: 106 MMOL/L (ref 101–111)
CHLORIDE SERPL-SCNC: 106 MMOL/L (ref 101–111)
CHLORIDE SERPL-SCNC: 107 MMOL/L (ref 101–111)
CHLORIDE SERPL-SCNC: 107 MMOL/L (ref 98–107)
CHLORIDE SERPL-SCNC: 108 MMOL/L (ref 101–111)
CHLORIDE SERPL-SCNC: 108 MMOL/L (ref 98–107)
CHLORIDE SERPL-SCNC: 109 MMOL/L (ref 101–111)
CHLORIDE SERPL-SCNC: 109 MMOL/L (ref 101–111)
CHLORIDE SERPL-SCNC: 110 MMOL/L (ref 101–111)
CHLORIDE SERPL-SCNC: 110 MMOL/L (ref 101–111)
CHLORIDE SERPL-SCNC: 111 MMOL/L (ref 101–111)
CHLORIDE SERPL-SCNC: 111 MMOL/L (ref 98–107)
CHLORIDE SERPL-SCNC: 113 MMOL/L (ref 101–111)
CHLORIDE SERPL-SCNC: 113 MMOL/L (ref 98–107)
CHLORIDE SERPL-SCNC: 114 MMOL/L (ref 98–107)
CHLORIDE SERPL-SCNC: 115 MMOL/L (ref 98–107)
CHLORIDE SERPL-SCNC: 116 MMOL/L (ref 98–107)
CHLORIDE SERPL-SCNC: 117 MMOL/L (ref 98–107)
CHLORIDE SERPL-SCNC: 118 MMOL/L (ref 98–107)
CHLORIDE SERPL-SCNC: 95 MMOL/L (ref 101–111)
CHLORIDE SERPL-SCNC: 98 MMOL/L (ref 101–111)
CHOLEST SERPL-MCNC: 158 MG/DL
CHOLEST SERPL-MCNC: 168 MG/DL
CHOLEST SERPL-MCNC: 190 MG/DL
CHOLEST/HDLC SERPL: 4.2 {RATIO}
CHOLEST/HDLC SERPL: 4.3 {RATIO}
CHOLEST/HDLC SERPL: 4.7 {RATIO}
CK SERPL-CCNC: 32 U/L (ref 20–180)
CLARITY UR: ABNORMAL
CLARITY UR: CLEAR
CO2 SERPL-SCNC: 14 MMOL/L (ref 22–29)
CO2 SERPL-SCNC: 16 MMOL/L (ref 22–32)
CO2 SERPL-SCNC: 17 MMOL/L (ref 22–29)
CO2 SERPL-SCNC: 17 MMOL/L (ref 22–29)
CO2 SERPL-SCNC: 17 MMOL/L (ref 22–32)
CO2 SERPL-SCNC: 18 MMOL/L (ref 22–29)
CO2 SERPL-SCNC: 18 MMOL/L (ref 22–32)
CO2 SERPL-SCNC: 19 MMOL/L (ref 22–32)
CO2 SERPL-SCNC: 20 MMOL/L (ref 22–29)
CO2 SERPL-SCNC: 21 MMOL/L (ref 22–29)
CO2 SERPL-SCNC: 21 MMOL/L (ref 22–32)
CO2 SERPL-SCNC: 21 MMOL/L (ref 22–32)
CO2 SERPL-SCNC: 22 MMOL/L (ref 22–32)
CO2 SERPL-SCNC: 23 MMOL/L (ref 22–29)
CO2 SERPL-SCNC: 25 MMOL/L (ref 22–32)
CO2 SERPL-SCNC: 28 MMOL/L (ref 22–32)
COARSE GRAN CASTS URNS QL MICRO: NORMAL /LPF
COLLECT DATE SP2 STL: NORMAL
COLLECT DATE SP3 STL: NORMAL
COLLECT DATE STL: NORMAL
COLLECT DURATION TIME UR: 24 HRS
COLOR UR: ABNORMAL
COLOR UR: YELLOW
CONV CO2: 17 MMOL/L (ref 22–32)
CONV CO2: 19 MMOL/L (ref 22–32)
CONV CO2: 19 MMOL/L (ref 22–32)
CONV CO2: 20 MMOL/L (ref 22–32)
CONV CO2: 20 MMOL/L (ref 22–32)
CONV CO2: 21 MMOL/L (ref 22–32)
CONV CO2: 22 MMOL/L (ref 22–32)
CONV LDL CHOLESTEROL DIRECT: 110 MG/DL (ref 0–100)
CONV LDL CHOLESTEROL DIRECT: 88 MG/DL (ref 0–100)
CONV LDL CHOLESTEROL DIRECT: 90 MG/DL (ref 0–100)
CONV TOTAL PROTEIN: 5.8 G/DL (ref 6.1–7.9)
CONV TOTAL PROTEIN: 5.8 G/DL (ref 6.1–7.9)
CONV TOTAL PROTEIN: 5.9 G/DL (ref 6.1–7.9)
CONV TOTAL PROTEIN: 6 G/DL (ref 6.1–7.9)
CONV TOTAL PROTEIN: 6.1 G/DL (ref 6.1–7.9)
CONV TOTAL PROTEIN: 6.3 G/DL (ref 6.1–7.9)
CONV TOTAL PROTEIN: 6.7 G/DL (ref 6.1–7.9)
CORTIS SERPL-MCNC: 10.52 MCG/DL
CORTIS SERPL-MCNC: 11.92 MCG/DL
CORTIS SERPL-MCNC: 12.05 MCG/DL
CORTIS SERPL-MCNC: 12.79 MCG/DL
CREAT BLD-MCNC: 0.74 MG/DL (ref 0.57–1)
CREAT BLD-MCNC: 0.79 MG/DL (ref 0.57–1)
CREAT BLD-MCNC: 0.83 MG/DL (ref 0.57–1)
CREAT BLD-MCNC: 0.92 MG/DL (ref 0.57–1)
CREAT BLD-MCNC: 0.96 MG/DL (ref 0.57–1)
CREAT BLD-MCNC: 0.99 MG/DL (ref 0.57–1)
CREAT BLD-MCNC: 1.09 MG/DL (ref 0.57–1)
CREAT BLD-MCNC: 1.2 MG/DL (ref 0.4–1)
CREAT BLD-MCNC: 1.22 MG/DL (ref 0.57–1)
CREAT BLD-MCNC: 1.3 MG/DL (ref 0.4–1)
CREAT BLD-MCNC: 1.6 MG/DL (ref 0.4–1)
CREAT BLD-MCNC: 1.8 MG/DL (ref 0.4–1)
CREAT BLD-MCNC: 1.84 MG/DL (ref 0.57–1)
CREAT BLD-MCNC: 1.9 MG/DL (ref 0.4–1)
CREAT BLD-MCNC: 1.9 MG/DL (ref 0.4–1)
CREAT BLD-MCNC: 2 MG/DL (ref 0.4–1)
CREAT BLD-MCNC: 2.1 MG/DL (ref 0.4–1)
CREAT BLD-MCNC: 2.2 MG/DL (ref 0.4–1)
CREAT BLD-MCNC: 2.3 MG/DL (ref 0.4–1)
CREAT BLDA-MCNC: 1.8 MG/DL (ref 0.6–1.3)
CREAT SERPL-MCNC: 1.6 MG/DL (ref 0.4–1)
CREAT UR-MCNC: 1.5 MG/DL (ref 0.4–1)
CREAT UR-MCNC: 1.6 MG/DL (ref 0.4–1)
CREAT UR-MCNC: 155.6 MG/DL
CREAT UR-MCNC: 2 MG/DL (ref 0.4–1)
CREAT UR-MCNC: 2.2 MG/DL (ref 0.4–1)
CREAT UR-MCNC: 2.8 MG/DL (ref 0.4–1)
DEPRECATED RDW RBC AUTO: 50.5 FL (ref 37–54)
DEPRECATED RDW RBC AUTO: 50.8 FL (ref 37–54)
DEPRECATED RDW RBC AUTO: 50.9 FL (ref 37–54)
DEPRECATED RDW RBC AUTO: 51.2 FL (ref 37–54)
DEPRECATED RDW RBC AUTO: 51.5 FL (ref 37–54)
DEPRECATED RDW RBC AUTO: 51.7 FL (ref 37–54)
DEPRECATED RDW RBC AUTO: 52.1 FL (ref 37–54)
DEPRECATED RDW RBC AUTO: 52.5 FL (ref 37–54)
DEPRECATED RDW RBC AUTO: 52.5 FL (ref 37–54)
DEPRECATED RDW RBC AUTO: 52.7 FL (ref 37–54)
DEPRECATED RDW RBC AUTO: 53.1 FL (ref 37–54)
DEPRECATED RDW RBC AUTO: 53.3 FL (ref 37–54)
DEPRECATED RDW RBC AUTO: 53.5 FL (ref 37–54)
DEPRECATED RDW RBC AUTO: 53.8 FL (ref 37–54)
DEPRECATED RDW RBC AUTO: 53.9 FL (ref 37–54)
DEPRECATED RDW RBC AUTO: 53.9 FL (ref 37–54)
DEPRECATED RDW RBC AUTO: 54.3 FL (ref 37–54)
DEPRECATED RDW RBC AUTO: 55.6 FL (ref 37–54)
DEPRECATED RDW RBC AUTO: 56.8 FL (ref 37–54)
DEPRECATED RDW RBC AUTO: 58.9 FL (ref 37–54)
DEPRECATED RDW RBC AUTO: 61.4 FL (ref 37–54)
DEPRECATED RDW RBC AUTO: 69.3 FL (ref 37–54)
DEPRECATED RDW RBC AUTO: 72 FL (ref 37–54)
DEPRECATED RDW RBC AUTO: 72.4 FL (ref 37–54)
DEPRECATED RDW RBC AUTO: 76.1 FL (ref 37–54)
DIFFERENTIAL METHOD BLD: (no result)
EOSINOPHIL # BLD AUTO: 0 10*3/MM3 (ref 0–0.4)
EOSINOPHIL # BLD AUTO: 0 10*3/MM3 (ref 0–0.4)
EOSINOPHIL # BLD AUTO: 0.02 10*3/MM3 (ref 0–0.4)
EOSINOPHIL # BLD AUTO: 0.03 10*3/MM3 (ref 0–0.4)
EOSINOPHIL # BLD AUTO: 0.03 10*3/MM3 (ref 0–0.4)
EOSINOPHIL # BLD AUTO: 0.05 10*3/MM3 (ref 0–0.4)
EOSINOPHIL # BLD AUTO: 0.06 10*3/MM3 (ref 0–0.4)
EOSINOPHIL # BLD AUTO: 0.08 10*3/MM3 (ref 0–0.4)
EOSINOPHIL # BLD AUTO: 0.09 10*3/MM3 (ref 0–0.4)
EOSINOPHIL # BLD AUTO: 0.09 10*3/MM3 (ref 0–0.4)
EOSINOPHIL # BLD AUTO: 0.1 10*3/UL (ref 0–0.3)
EOSINOPHIL # BLD AUTO: 0.11 10*3/MM3 (ref 0–0.4)
EOSINOPHIL # BLD AUTO: 0.15 10*3/MM3 (ref 0–0.4)
EOSINOPHIL # BLD AUTO: 0.15 10*3/MM3 (ref 0–0.4)
EOSINOPHIL # BLD AUTO: 0.16 10*3/MM3 (ref 0–0.4)
EOSINOPHIL # BLD AUTO: 0.21 10*3/MM3 (ref 0–0.4)
EOSINOPHIL # BLD AUTO: 0.22 10*3/MM3 (ref 0–0.4)
EOSINOPHIL # BLD AUTO: 0.23 10*3/MM3 (ref 0–0.4)
EOSINOPHIL # BLD AUTO: 0.24 10*3/MM3 (ref 0–0.4)
EOSINOPHIL # BLD AUTO: 0.26 10*3/MM3 (ref 0–0.4)
EOSINOPHIL # BLD AUTO: 0.31 10*3/MM3 (ref 0–0.4)
EOSINOPHIL # BLD AUTO: 0.31 10*3/MM3 (ref 0–0.4)
EOSINOPHIL # BLD AUTO: 0.32 10*3/MM3 (ref 0–0.4)
EOSINOPHIL # BLD AUTO: 0.33 10*3/MM3 (ref 0–0.4)
EOSINOPHIL # BLD AUTO: 0.38 10*3/MM3 (ref 0–0.4)
EOSINOPHIL # BLD AUTO: 1 % (ref 0–3)
EOSINOPHIL NFR BLD AUTO: 0.2 % (ref 0.3–6.2)
EOSINOPHIL NFR BLD AUTO: 0.3 % (ref 0.3–6.2)
EOSINOPHIL NFR BLD AUTO: 0.4 % (ref 0.3–6.2)
EOSINOPHIL NFR BLD AUTO: 0.4 % (ref 0.3–6.2)
EOSINOPHIL NFR BLD AUTO: 0.5 % (ref 0.3–6.2)
EOSINOPHIL NFR BLD AUTO: 1.5 % (ref 0.3–6.2)
EOSINOPHIL NFR BLD AUTO: 1.6 % (ref 0.3–6.2)
EOSINOPHIL NFR BLD AUTO: 1.9 % (ref 0.3–6.2)
EOSINOPHIL NFR BLD AUTO: 2 % (ref 0.3–6.2)
EOSINOPHIL NFR BLD AUTO: 2.3 % (ref 0.3–6.2)
EOSINOPHIL NFR BLD AUTO: 2.4 % (ref 0.3–6.2)
EOSINOPHIL NFR BLD AUTO: 2.8 % (ref 0.3–6.2)
EOSINOPHIL NFR BLD AUTO: 2.9 % (ref 0.3–6.2)
EOSINOPHIL NFR BLD AUTO: 3 % (ref 0.3–6.2)
EOSINOPHIL NFR BLD AUTO: 3.3 % (ref 0.3–6.2)
EOSINOPHIL NFR BLD AUTO: 3.4 % (ref 0.3–6.2)
EOSINOPHIL NFR BLD AUTO: 3.6 % (ref 0.3–6.2)
EOSINOPHIL NFR BLD AUTO: 4.1 % (ref 0.3–6.2)
EOSINOPHIL NFR BLD AUTO: 4.1 % (ref 0.3–6.2)
EOSINOPHIL NFR BLD AUTO: 4.2 % (ref 0.3–6.2)
ERYTHROCYTE [DISTWIDTH] IN BLOOD BY AUTOMATED COUNT: 15.8 % (ref 12.3–15.4)
ERYTHROCYTE [DISTWIDTH] IN BLOOD BY AUTOMATED COUNT: 15.8 % (ref 12.3–15.4)
ERYTHROCYTE [DISTWIDTH] IN BLOOD BY AUTOMATED COUNT: 16 % (ref 12.3–15.4)
ERYTHROCYTE [DISTWIDTH] IN BLOOD BY AUTOMATED COUNT: 16 % (ref 12.3–15.4)
ERYTHROCYTE [DISTWIDTH] IN BLOOD BY AUTOMATED COUNT: 16.1 % (ref 12.3–15.4)
ERYTHROCYTE [DISTWIDTH] IN BLOOD BY AUTOMATED COUNT: 16.2 % (ref 12.3–15.4)
ERYTHROCYTE [DISTWIDTH] IN BLOOD BY AUTOMATED COUNT: 16.3 % (ref 12.3–15.4)
ERYTHROCYTE [DISTWIDTH] IN BLOOD BY AUTOMATED COUNT: 16.6 % (ref 12.3–15.4)
ERYTHROCYTE [DISTWIDTH] IN BLOOD BY AUTOMATED COUNT: 16.7 % (ref 12.3–15.4)
ERYTHROCYTE [DISTWIDTH] IN BLOOD BY AUTOMATED COUNT: 17.4 % (ref 11.5–14.5)
ERYTHROCYTE [DISTWIDTH] IN BLOOD BY AUTOMATED COUNT: 17.4 % (ref 12.3–15.4)
ERYTHROCYTE [DISTWIDTH] IN BLOOD BY AUTOMATED COUNT: 18.2 % (ref 12.3–15.4)
ERYTHROCYTE [DISTWIDTH] IN BLOOD BY AUTOMATED COUNT: 18.2 % (ref 12.3–15.4)
ERYTHROCYTE [DISTWIDTH] IN BLOOD BY AUTOMATED COUNT: 18.4 % (ref 12.3–15.4)
ERYTHROCYTE [DISTWIDTH] IN BLOOD BY AUTOMATED COUNT: 18.5 % (ref 12.3–15.4)
ERYTHROCYTE [DISTWIDTH] IN BLOOD BY AUTOMATED COUNT: 18.7 % (ref 12.3–15.4)
ERYTHROCYTE [DISTWIDTH] IN BLOOD BY AUTOMATED COUNT: 18.9 % (ref 12.3–15.4)
ERYTHROCYTE [DISTWIDTH] IN BLOOD BY AUTOMATED COUNT: 19.2 % (ref 12.3–15.4)
ERYTHROCYTE [DISTWIDTH] IN BLOOD BY AUTOMATED COUNT: 20 % (ref 12.3–15.4)
ERYTHROCYTE [DISTWIDTH] IN BLOOD BY AUTOMATED COUNT: 21.6 % (ref 12.3–15.4)
ERYTHROCYTE [DISTWIDTH] IN BLOOD BY AUTOMATED COUNT: 23.4 % (ref 12.3–15.4)
ERYTHROCYTE [DISTWIDTH] IN BLOOD BY AUTOMATED COUNT: 23.5 % (ref 12.3–15.4)
ERYTHROCYTE [DISTWIDTH] IN BLOOD BY AUTOMATED COUNT: 23.7 % (ref 12.3–15.4)
ETHNIC BACKGROUND STATED: 1.7 MIU/ML (ref 2.6–18.5)
FATTY CASTS #/AREA URNS LPF: NORMAL /LPF
FERRITIN SERPL-MCNC: 1218 NG/ML (ref 11–307)
FERRITIN SERPL-MCNC: 1224 NG/ML (ref 11–307)
FERRITIN SERPL-MCNC: 1276 NG/ML (ref 11–307)
FINE GRAN CASTS URNS QL MICRO: NORMAL /LPF
FOLATE SERPL-MCNC: 12.4 NG/ML (ref 5.9–24.8)
GFR SERPL CREATININE-BSD FRML MDRD: 21 ML/MIN/1.73
GFR SERPL CREATININE-BSD FRML MDRD: 22 ML/MIN/1.73
GFR SERPL CREATININE-BSD FRML MDRD: 23 ML/MIN/1.73
GFR SERPL CREATININE-BSD FRML MDRD: 24 ML/MIN/1.73
GFR SERPL CREATININE-BSD FRML MDRD: 26 ML/MIN/1.73
GFR SERPL CREATININE-BSD FRML MDRD: 26 ML/MIN/1.73
GFR SERPL CREATININE-BSD FRML MDRD: 27 ML/MIN/1.73
GFR SERPL CREATININE-BSD FRML MDRD: 28 ML/MIN/1.73
GFR SERPL CREATININE-BSD FRML MDRD: 32 ML/MIN/1.73
GFR SERPL CREATININE-BSD FRML MDRD: 32 ML/MIN/1.73
GFR SERPL CREATININE-BSD FRML MDRD: 40 ML/MIN/1.73
GFR SERPL CREATININE-BSD FRML MDRD: 43 ML/MIN/1.73
GFR SERPL CREATININE-BSD FRML MDRD: 44 ML/MIN/1.73
GFR SERPL CREATININE-BSD FRML MDRD: 49 ML/MIN/1.73
GFR SERPL CREATININE-BSD FRML MDRD: 55 ML/MIN/1.73
GFR SERPL CREATININE-BSD FRML MDRD: 57 ML/MIN/1.73
GFR SERPL CREATININE-BSD FRML MDRD: 60 ML/MIN/1.73
GFR SERPL CREATININE-BSD FRML MDRD: 67 ML/MIN/1.73
GFR SERPL CREATININE-BSD FRML MDRD: 71 ML/MIN/1.73
GFR SERPL CREATININE-BSD FRML MDRD: 77 ML/MIN/1.73
GLOBULIN UR ELPH-MCNC: 1.7 GM/DL
GLOBULIN UR ELPH-MCNC: 1.8 GM/DL
GLOBULIN UR ELPH-MCNC: 2.4 GM/DL
GLOBULIN UR ELPH-MCNC: 2.4 GM/DL (ref 2.5–3.8)
GLOBULIN UR ELPH-MCNC: 2.4 GM/DL (ref 2.5–3.8)
GLOBULIN UR ELPH-MCNC: 2.5 GM/DL (ref 2.5–3.8)
GLOBULIN UR ELPH-MCNC: 2.6 G/DL (ref 2.5–3.8)
GLOBULIN UR ELPH-MCNC: 2.6 GM/DL
GLOBULIN UR ELPH-MCNC: 2.6 GM/DL (ref 2.5–3.8)
GLOBULIN UR ELPH-MCNC: 2.7 GM/DL (ref 2.5–3.8)
GLOBULIN UR ELPH-MCNC: 2.7 GM/DL (ref 2.5–3.8)
GLOBULIN UR ELPH-MCNC: 2.8 G/DL (ref 2.5–3.8)
GLOBULIN UR ELPH-MCNC: 2.8 G/DL (ref 2.5–3.8)
GLOBULIN UR ELPH-MCNC: 2.8 GM/DL
GLOBULIN UR ELPH-MCNC: 2.8 GM/DL (ref 2.5–3.8)
GLOBULIN UR ELPH-MCNC: 2.8 GM/DL (ref 2.5–3.8)
GLOBULIN UR ELPH-MCNC: 2.9 G/DL (ref 2.5–3.8)
GLOBULIN UR ELPH-MCNC: 2.9 G/DL (ref 2.5–3.8)
GLOBULIN UR ELPH-MCNC: 3 GM/DL (ref 2.5–3.8)
GLOBULIN UR ELPH-MCNC: 3 GM/DL (ref 2.5–3.8)
GLOBULIN UR ELPH-MCNC: 3.1 G/DL (ref 2.5–3.8)
GLOBULIN UR ELPH-MCNC: 3.3 G/DL (ref 2.5–3.8)
GLUCOSE BLD-MCNC: 100 MG/DL (ref 65–99)
GLUCOSE BLD-MCNC: 101 MG/DL (ref 65–99)
GLUCOSE BLD-MCNC: 101 MG/DL (ref 65–99)
GLUCOSE BLD-MCNC: 102 MG/DL (ref 65–99)
GLUCOSE BLD-MCNC: 114 MG/DL (ref 65–99)
GLUCOSE BLD-MCNC: 116 MG/DL (ref 65–99)
GLUCOSE BLD-MCNC: 117 MG/DL (ref 65–99)
GLUCOSE BLD-MCNC: 123 MG/DL (ref 65–99)
GLUCOSE BLD-MCNC: 125 MG/DL (ref 65–99)
GLUCOSE BLD-MCNC: 142 MG/DL (ref 65–99)
GLUCOSE BLD-MCNC: 156 MG/DL (ref 65–99)
GLUCOSE BLD-MCNC: 159 MG/DL (ref 65–99)
GLUCOSE BLD-MCNC: 162 MG/DL (ref 70–105)
GLUCOSE BLD-MCNC: 167 MG/DL (ref 65–99)
GLUCOSE BLD-MCNC: 170 MG/DL (ref 65–99)
GLUCOSE BLD-MCNC: 43 MG/DL (ref 65–99)
GLUCOSE BLD-MCNC: 51 MG/DL (ref 65–99)
GLUCOSE BLD-MCNC: 69 MG/DL (ref 65–99)
GLUCOSE BLD-MCNC: 78 MG/DL (ref 65–99)
GLUCOSE BLD-MCNC: 79 MG/DL (ref 65–99)
GLUCOSE BLD-MCNC: 80 MG/DL (ref 65–99)
GLUCOSE BLD-MCNC: 90 MG/DL (ref 65–99)
GLUCOSE BLD-MCNC: 96 MG/DL (ref 65–99)
GLUCOSE BLD-MCNC: 98 MG/DL (ref 65–99)
GLUCOSE SERPL-MCNC: 119 MG/DL (ref 65–99)
GLUCOSE SERPL-MCNC: 271 MG/DL (ref 65–99)
GLUCOSE SERPL-MCNC: 292 MG/DL (ref 65–99)
GLUCOSE SERPL-MCNC: 376 MG/DL (ref 65–99)
GLUCOSE SERPL-MCNC: 398 MG/DL (ref 65–99)
GLUCOSE SERPL-MCNC: 62 MG/DL (ref 65–99)
GLUCOSE SERPL-MCNC: ABNORMAL MG/DL
GLUCOSE SERPL-MCNC: ABNORMAL MG/DL (ref 65–99)
GLUCOSE SERPL-MCNC: ABNORMAL MG/DL (ref 65–99)
GLUCOSE UR STRIP-MCNC: ABNORMAL MG/DL
GLUCOSE UR STRIP-MCNC: NEGATIVE MG/DL
GRAN CASTS URNS QL MICRO: NORMAL /LPF
HAPTOGLOB SERPL-MCNC: 311 MG/DL (ref 36–195)
HAPTOGLOB SERPL-MCNC: 315 MG/DL (ref 36–195)
HBA1C MFR BLD: 11.3 % (ref 0–5.6)
HBA1C MFR BLD: 11.5 % (ref 0–5.6)
HCT VFR BLD AUTO: 23.8 % (ref 34–46.6)
HCT VFR BLD AUTO: 24.6 % (ref 34–46.6)
HCT VFR BLD AUTO: 24.9 % (ref 34–46.6)
HCT VFR BLD AUTO: 25.1 % (ref 34–46.6)
HCT VFR BLD AUTO: 25.1 % (ref 35–49)
HCT VFR BLD AUTO: 26 % (ref 34–46.6)
HCT VFR BLD AUTO: 26 % (ref 34–46.6)
HCT VFR BLD AUTO: 26.5 % (ref 34–46.6)
HCT VFR BLD AUTO: 26.5 % (ref 34–46.6)
HCT VFR BLD AUTO: 27.3 % (ref 34–46.6)
HCT VFR BLD AUTO: 29.5 % (ref 34–46.6)
HCT VFR BLD AUTO: 30.5 % (ref 34–46.6)
HCT VFR BLD AUTO: 32.1 % (ref 34–46.6)
HCT VFR BLD AUTO: 35 % (ref 34–46.6)
HCT VFR BLD AUTO: 35.4 % (ref 34–46.6)
HCT VFR BLD AUTO: 36.1 % (ref 34–46.6)
HCT VFR BLD AUTO: 36.5 % (ref 34–46.6)
HCT VFR BLD AUTO: 36.6 % (ref 34–46.6)
HCT VFR BLD AUTO: 36.6 % (ref 34–46.6)
HCT VFR BLD AUTO: 36.8 % (ref 34–46.6)
HCT VFR BLD AUTO: 37.8 % (ref 34–46.6)
HCT VFR BLD AUTO: 39 % (ref 34–46.6)
HCT VFR BLD AUTO: 40.6 % (ref 34–46.6)
HCT VFR BLD AUTO: 42.1 % (ref 34–46.6)
HCT VFR BLD AUTO: 42.3 % (ref 34–46.6)
HCT VFR BLD AUTO: 43.3 % (ref 34–46.6)
HCT VFR BLD AUTO: 45.1 % (ref 34–46.6)
HCT VFR BLD AUTO: 49.5 % (ref 34–46.6)
HDLC SERPL-MCNC: 38 MG/DL
HDLC SERPL-MCNC: 40 MG/DL
HDLC SERPL-MCNC: 41 MG/DL
HEMOCCULT STL QL: NEGATIVE
HGB BLD-MCNC: 10.7 G/DL (ref 12–15.9)
HGB BLD-MCNC: 11 G/DL (ref 12–15.9)
HGB BLD-MCNC: 11.1 G/DL (ref 12–15.9)
HGB BLD-MCNC: 11.2 G/DL (ref 12–15.9)
HGB BLD-MCNC: 11.3 G/DL (ref 12–15.9)
HGB BLD-MCNC: 11.6 G/DL (ref 12–15.9)
HGB BLD-MCNC: 11.8 G/DL (ref 12–15.9)
HGB BLD-MCNC: 11.9 G/DL (ref 12–15.9)
HGB BLD-MCNC: 12.1 G/DL (ref 12–15.9)
HGB BLD-MCNC: 13.1 G/DL (ref 12–15.9)
HGB BLD-MCNC: 13.7 G/DL (ref 12–15.9)
HGB BLD-MCNC: 13.8 G/DL (ref 12–15.9)
HGB BLD-MCNC: 13.9 G/DL (ref 12–15.9)
HGB BLD-MCNC: 14.5 G/DL (ref 12–15.9)
HGB BLD-MCNC: 16.3 G/DL (ref 12–15.9)
HGB BLD-MCNC: 7.2 G/DL (ref 12–15.9)
HGB BLD-MCNC: 7.6 G/DL (ref 12–15.9)
HGB BLD-MCNC: 7.6 G/DL (ref 12–15.9)
HGB BLD-MCNC: 7.9 G/DL (ref 12–15.9)
HGB BLD-MCNC: 7.9 G/DL (ref 12–15.9)
HGB BLD-MCNC: 8 G/DL (ref 12–15.9)
HGB BLD-MCNC: 8.2 G/DL (ref 12–15)
HGB BLD-MCNC: 8.3 G/DL (ref 12–15.9)
HGB BLD-MCNC: 9 G/DL (ref 12–15.9)
HGB BLD-MCNC: 9.2 G/DL (ref 12–15.9)
HGB BLD-MCNC: 9.6 G/DL (ref 12–15.9)
HGB UR QL STRIP.AUTO: ABNORMAL
HGB UR QL STRIP.AUTO: NEGATIVE
HYALINE CASTS UR QL AUTO: ABNORMAL /LPF
HYALINE CASTS UR QL AUTO: NORMAL /LPF
IFEU: NORMAL
IRON 24H UR-MRATE: 14 MCG/DL (ref 28–170)
IRON 24H UR-MRATE: 15 MCG/DL (ref 28–170)
IRON 24H UR-MRATE: 19 MCG/DL (ref 28–170)
IRON SATN MFR SERPL: 12 % (ref 15–50)
IRON SATN MFR SERPL: 16 % (ref 15–50)
IRON SATN MFR SERPL: 7 % (ref 15–50)
IRON SATN MFR SERPL: 7 % (ref 15–50)
IRON SERPL-MCNC: 16 UG/DL (ref 28–170)
IRON SERPL-MCNC: 38 UG/DL (ref 28–170)
KETONES UR QL STRIP: ABNORMAL
KETONES UR QL STRIP: NEGATIVE
LAB AP CASE REPORT: NORMAL
LAB AP DIAGNOSIS COMMENT: NORMAL
LDLC/HDLC SERPL: 2.3 {RATIO}
LDLC/HDLC SERPL: 2.3 {RATIO}
LDLC/HDLC SERPL: 2.7 {RATIO}
LEUKOCYTE ESTERASE UR QL STRIP.AUTO: ABNORMAL
LEUKOCYTE ESTERASE UR QL STRIP.AUTO: NEGATIVE
LIPID INTERPRETATION: ABNORMAL
LYMPHOCYTES # BLD AUTO: 0.21 10*3/MM3 (ref 0.7–3.1)
LYMPHOCYTES # BLD AUTO: 0.24 10*3/MM3 (ref 0.7–3.1)
LYMPHOCYTES # BLD AUTO: 0.3 10*3/MM3 (ref 0.7–3.1)
LYMPHOCYTES # BLD AUTO: 0.3 10*3/MM3 (ref 0.7–3.1)
LYMPHOCYTES # BLD AUTO: 0.33 10*3/MM3 (ref 0.7–3.1)
LYMPHOCYTES # BLD AUTO: 0.34 10*3/MM3 (ref 0.7–3.1)
LYMPHOCYTES # BLD AUTO: 0.34 10*3/MM3 (ref 0.7–3.1)
LYMPHOCYTES # BLD AUTO: 0.37 10*3/MM3 (ref 0.7–3.1)
LYMPHOCYTES # BLD AUTO: 0.38 10*3/MM3 (ref 0.7–3.1)
LYMPHOCYTES # BLD AUTO: 0.39 10*3/MM3 (ref 0.7–3.1)
LYMPHOCYTES # BLD AUTO: 0.45 10*3/MM3 (ref 0.7–3.1)
LYMPHOCYTES # BLD AUTO: 0.49 10*3/MM3 (ref 0.7–3.1)
LYMPHOCYTES # BLD AUTO: 0.5 10*3/UL (ref 0.8–4.8)
LYMPHOCYTES # BLD AUTO: 0.54 10*3/MM3 (ref 0.7–3.1)
LYMPHOCYTES # BLD AUTO: 0.55 10*3/MM3 (ref 0.7–3.1)
LYMPHOCYTES # BLD AUTO: 0.58 10*3/MM3 (ref 0.7–3.1)
LYMPHOCYTES # BLD AUTO: 0.6 10*3/MM3 (ref 0.7–3.1)
LYMPHOCYTES # BLD AUTO: 0.75 10*3/MM3 (ref 0.7–3.1)
LYMPHOCYTES # BLD AUTO: 0.81 10*3/MM3 (ref 0.7–3.1)
LYMPHOCYTES # BLD AUTO: 0.86 10*3/MM3 (ref 0.7–3.1)
LYMPHOCYTES # BLD AUTO: 0.91 10*3/MM3 (ref 0.7–3.1)
LYMPHOCYTES # BLD AUTO: 0.91 10*3/MM3 (ref 0.7–3.1)
LYMPHOCYTES # BLD AUTO: 0.99 10*3/MM3 (ref 0.7–3.1)
LYMPHOCYTES # BLD AUTO: 1 10*3/MM3 (ref 0.7–3.1)
LYMPHOCYTES # BLD AUTO: 1.06 10*3/MM3 (ref 0.7–3.1)
LYMPHOCYTES # BLD AUTO: 1.07 10*3/MM3 (ref 0.7–3.1)
LYMPHOCYTES # BLD AUTO: 1.09 10*3/MM3 (ref 0.7–3.1)
LYMPHOCYTES NFR BLD AUTO: 10.2 % (ref 19.6–45.3)
LYMPHOCYTES NFR BLD AUTO: 10.4 % (ref 19.6–45.3)
LYMPHOCYTES NFR BLD AUTO: 10.5 % (ref 19.6–45.3)
LYMPHOCYTES NFR BLD AUTO: 10.7 % (ref 19.6–45.3)
LYMPHOCYTES NFR BLD AUTO: 11.6 % (ref 19.6–45.3)
LYMPHOCYTES NFR BLD AUTO: 11.8 % (ref 19.6–45.3)
LYMPHOCYTES NFR BLD AUTO: 13.5 % (ref 19.6–45.3)
LYMPHOCYTES NFR BLD AUTO: 13.7 % (ref 19.6–45.3)
LYMPHOCYTES NFR BLD AUTO: 14.3 % (ref 19.6–45.3)
LYMPHOCYTES NFR BLD AUTO: 4 % (ref 19.6–45.3)
LYMPHOCYTES NFR BLD AUTO: 5.2 % (ref 19.6–45.3)
LYMPHOCYTES NFR BLD AUTO: 6.5 % (ref 19.6–45.3)
LYMPHOCYTES NFR BLD AUTO: 6.5 % (ref 19.6–45.3)
LYMPHOCYTES NFR BLD AUTO: 6.6 % (ref 19.6–45.3)
LYMPHOCYTES NFR BLD AUTO: 6.7 % (ref 19.6–45.3)
LYMPHOCYTES NFR BLD AUTO: 6.9 % (ref 19.6–45.3)
LYMPHOCYTES NFR BLD AUTO: 7 % (ref 19.6–45.3)
LYMPHOCYTES NFR BLD AUTO: 7 % (ref 19.6–45.3)
LYMPHOCYTES NFR BLD AUTO: 7.5 % (ref 19.6–45.3)
LYMPHOCYTES NFR BLD AUTO: 7.6 % (ref 19.6–45.3)
LYMPHOCYTES NFR BLD AUTO: 7.9 % (ref 19.6–45.3)
LYMPHOCYTES NFR BLD AUTO: 7.9 % (ref 19.6–45.3)
LYMPHOCYTES NFR BLD AUTO: 8 % (ref 18–42)
LYMPHOCYTES NFR BLD AUTO: 8.6 % (ref 19.6–45.3)
LYMPHOCYTES NFR BLD AUTO: 8.8 % (ref 19.6–45.3)
LYMPHOCYTES NFR BLD AUTO: 8.8 % (ref 19.6–45.3)
LYMPHOCYTES NFR BLD AUTO: 9 % (ref 19.6–45.3)
Lab: NORMAL
MAGNESIUM SERPL-MCNC: 1.3 MG/DL (ref 1.6–2.4)
MAGNESIUM SERPL-MCNC: 1.5 MG/DL (ref 1.6–2.4)
MAGNESIUM SERPL-MCNC: 1.5 MG/DL (ref 1.8–2.5)
MAGNESIUM SERPL-MCNC: 1.5 MG/DL (ref 1.8–2.5)
MAGNESIUM SERPL-MCNC: 1.6 MG/DL (ref 1.8–2.5)
MAGNESIUM SERPL-MCNC: 1.8 MG/DL (ref 1.8–2.5)
MAGNESIUM SERPL-MCNC: 2.1 MG/DL (ref 1.8–2.5)
MAGNESIUM SERPL-MCNC: 2.2 MG/DL (ref 1.6–2.4)
MAGNESIUM UR-MCNC: 0.92 % (ref 0.5–1.5)
MAGNESIUM UR-MCNC: 1.99 % (ref 0.5–1.5)
MCH RBC QN AUTO: 23.8 PG (ref 26.6–33)
MCH RBC QN AUTO: 23.8 PG (ref 26.6–33)
MCH RBC QN AUTO: 23.9 PG (ref 26.6–33)
MCH RBC QN AUTO: 24.1 PG (ref 26.6–33)
MCH RBC QN AUTO: 24.2 PG (ref 26.6–33)
MCH RBC QN AUTO: 24.3 PG (ref 26.6–33)
MCH RBC QN AUTO: 24.4 PG (ref 26.6–33)
MCH RBC QN AUTO: 24.5 PG (ref 26.6–33)
MCH RBC QN AUTO: 24.5 PG (ref 26.6–33)
MCH RBC QN AUTO: 24.7 PG (ref 26.6–33)
MCH RBC QN AUTO: 24.8 PG (ref 26–32)
MCH RBC QN AUTO: 26.1 PG (ref 26.6–33)
MCH RBC QN AUTO: 26.2 PG (ref 26.6–33)
MCH RBC QN AUTO: 26.8 PG (ref 26.6–33)
MCH RBC QN AUTO: 27.3 PG (ref 26.6–33)
MCH RBC QN AUTO: 27.4 PG (ref 26.6–33)
MCH RBC QN AUTO: 27.4 PG (ref 26.6–33)
MCH RBC QN AUTO: 28.4 PG (ref 26.6–33)
MCH RBC QN AUTO: 28.9 PG (ref 26.6–33)
MCH RBC QN AUTO: 28.9 PG (ref 26.6–33)
MCH RBC QN AUTO: 29 PG (ref 26.6–33)
MCH RBC QN AUTO: 29.1 PG (ref 26.6–33)
MCH RBC QN AUTO: 29.1 PG (ref 26.6–33)
MCH RBC QN AUTO: 29.3 PG (ref 26.6–33)
MCH RBC QN AUTO: 29.4 PG (ref 26.6–33)
MCH RBC QN AUTO: 29.8 PG (ref 26.6–33)
MCHC RBC AUTO-ENTMCNC: 29.8 G/DL (ref 31.5–35.7)
MCHC RBC AUTO-ENTMCNC: 29.9 G/DL (ref 31.5–35.7)
MCHC RBC AUTO-ENTMCNC: 30.1 G/DL (ref 31.5–35.7)
MCHC RBC AUTO-ENTMCNC: 30.2 G/DL (ref 31.5–35.7)
MCHC RBC AUTO-ENTMCNC: 30.3 G/DL (ref 31.5–35.7)
MCHC RBC AUTO-ENTMCNC: 30.4 G/DL (ref 31.5–35.7)
MCHC RBC AUTO-ENTMCNC: 30.4 G/DL (ref 31.5–35.7)
MCHC RBC AUTO-ENTMCNC: 30.5 G/DL (ref 31.5–35.7)
MCHC RBC AUTO-ENTMCNC: 30.5 G/DL (ref 31.5–35.7)
MCHC RBC AUTO-ENTMCNC: 30.6 G/DL (ref 31.5–35.7)
MCHC RBC AUTO-ENTMCNC: 30.7 G/DL (ref 31.5–35.7)
MCHC RBC AUTO-ENTMCNC: 30.8 G/DL (ref 31.5–35.7)
MCHC RBC AUTO-ENTMCNC: 30.8 G/DL (ref 31.5–35.7)
MCHC RBC AUTO-ENTMCNC: 32 G/DL (ref 31.5–35.7)
MCHC RBC AUTO-ENTMCNC: 32.1 G/DL (ref 31.5–35.7)
MCHC RBC AUTO-ENTMCNC: 32.2 G/DL (ref 31.5–35.7)
MCHC RBC AUTO-ENTMCNC: 32.2 G/DL (ref 31.5–35.7)
MCHC RBC AUTO-ENTMCNC: 32.3 G/DL (ref 31.5–35.7)
MCHC RBC AUTO-ENTMCNC: 32.3 G/DL (ref 31.5–35.7)
MCHC RBC AUTO-ENTMCNC: 32.4 G/DL (ref 31.5–35.7)
MCHC RBC AUTO-ENTMCNC: 32.6 G/DL (ref 32–36)
MCHC RBC AUTO-ENTMCNC: 32.8 G/DL (ref 31.5–35.7)
MCHC RBC AUTO-ENTMCNC: 32.9 G/DL (ref 31.5–35.7)
MCHC RBC AUTO-ENTMCNC: 34 G/DL (ref 31.5–35.7)
MCV RBC AUTO: 76.2 FL (ref 80–94)
MCV RBC AUTO: 76.7 FL (ref 79–97)
MCV RBC AUTO: 78 FL (ref 79–97)
MCV RBC AUTO: 78.9 FL (ref 79–97)
MCV RBC AUTO: 79 FL (ref 79–97)
MCV RBC AUTO: 79.1 FL (ref 79–97)
MCV RBC AUTO: 79.8 FL (ref 79–97)
MCV RBC AUTO: 80.1 FL (ref 79–97)
MCV RBC AUTO: 81 FL (ref 79–97)
MCV RBC AUTO: 86.6 FL (ref 79–97)
MCV RBC AUTO: 87.7 FL (ref 79–97)
MCV RBC AUTO: 87.7 FL (ref 79–97)
MCV RBC AUTO: 88.5 FL (ref 79–97)
MCV RBC AUTO: 88.6 FL (ref 79–97)
MCV RBC AUTO: 89.1 FL (ref 79–97)
MCV RBC AUTO: 89.2 FL (ref 79–97)
MCV RBC AUTO: 89.7 FL (ref 79–97)
MCV RBC AUTO: 89.9 FL (ref 79–97)
MCV RBC AUTO: 90 FL (ref 79–97)
MCV RBC AUTO: 90 FL (ref 79–97)
MCV RBC AUTO: 90.3 FL (ref 79–97)
MCV RBC AUTO: 90.4 FL (ref 79–97)
MCV RBC AUTO: 90.5 FL (ref 79–97)
MCV RBC AUTO: 90.9 FL (ref 79–97)
MONOCYTES # BLD AUTO: 0.18 10*3/MM3 (ref 0.1–0.9)
MONOCYTES # BLD AUTO: 0.2 10*3/MM3 (ref 0.1–0.9)
MONOCYTES # BLD AUTO: 0.3 10*3/MM3 (ref 0.1–0.9)
MONOCYTES # BLD AUTO: 0.4 10*3/MM3 (ref 0.1–0.9)
MONOCYTES # BLD AUTO: 0.43 10*3/MM3 (ref 0.1–0.9)
MONOCYTES # BLD AUTO: 0.54 10*3/MM3 (ref 0.1–0.9)
MONOCYTES # BLD AUTO: 0.59 10*3/MM3 (ref 0.1–0.9)
MONOCYTES # BLD AUTO: 0.6 10*3/MM3 (ref 0.1–0.9)
MONOCYTES # BLD AUTO: 0.6 10*3/UL (ref 0.1–1.3)
MONOCYTES # BLD AUTO: 0.64 10*3/MM3 (ref 0.1–0.9)
MONOCYTES # BLD AUTO: 0.64 10*3/MM3 (ref 0.1–0.9)
MONOCYTES # BLD AUTO: 0.65 10*3/MM3 (ref 0.1–0.9)
MONOCYTES # BLD AUTO: 0.65 10*3/MM3 (ref 0.1–0.9)
MONOCYTES # BLD AUTO: 0.67 10*3/MM3 (ref 0.1–0.9)
MONOCYTES # BLD AUTO: 0.74 10*3/MM3 (ref 0.1–0.9)
MONOCYTES # BLD AUTO: 0.76 10*3/MM3 (ref 0.1–0.9)
MONOCYTES # BLD AUTO: 0.79 10*3/MM3 (ref 0.1–0.9)
MONOCYTES # BLD AUTO: 0.88 10*3/MM3 (ref 0.1–0.9)
MONOCYTES # BLD AUTO: 0.92 10*3/MM3 (ref 0.1–0.9)
MONOCYTES # BLD AUTO: 0.95 10*3/MM3 (ref 0.1–0.9)
MONOCYTES # BLD AUTO: 0.97 10*3/MM3 (ref 0.1–0.9)
MONOCYTES # BLD AUTO: 0.97 10*3/MM3 (ref 0.1–0.9)
MONOCYTES # BLD AUTO: 0.98 10*3/MM3 (ref 0.1–0.9)
MONOCYTES # BLD AUTO: 1 10*3/MM3 (ref 0.1–0.9)
MONOCYTES # BLD AUTO: 1.08 10*3/MM3 (ref 0.1–0.9)
MONOCYTES # BLD AUTO: 1.16 10*3/MM3 (ref 0.1–0.9)
MONOCYTES # BLD AUTO: 1.25 10*3/MM3 (ref 0.1–0.9)
MONOCYTES NFR BLD AUTO: 10 % (ref 2–11)
MONOCYTES NFR BLD AUTO: 10 % (ref 5–12)
MONOCYTES NFR BLD AUTO: 10.1 % (ref 5–12)
MONOCYTES NFR BLD AUTO: 10.6 % (ref 5–12)
MONOCYTES NFR BLD AUTO: 11.1 % (ref 5–12)
MONOCYTES NFR BLD AUTO: 11.5 % (ref 5–12)
MONOCYTES NFR BLD AUTO: 11.7 % (ref 5–12)
MONOCYTES NFR BLD AUTO: 12.5 % (ref 5–12)
MONOCYTES NFR BLD AUTO: 12.6 % (ref 5–12)
MONOCYTES NFR BLD AUTO: 13 % (ref 5–12)
MONOCYTES NFR BLD AUTO: 13.2 % (ref 5–12)
MONOCYTES NFR BLD AUTO: 13.3 % (ref 5–12)
MONOCYTES NFR BLD AUTO: 14.1 % (ref 5–12)
MONOCYTES NFR BLD AUTO: 14.4 % (ref 5–12)
MONOCYTES NFR BLD AUTO: 14.9 % (ref 5–12)
MONOCYTES NFR BLD AUTO: 15.2 % (ref 5–12)
MONOCYTES NFR BLD AUTO: 15.5 % (ref 5–12)
MONOCYTES NFR BLD AUTO: 16.9 % (ref 5–12)
MONOCYTES NFR BLD AUTO: 3.1 % (ref 5–12)
MONOCYTES NFR BLD AUTO: 4 % (ref 5–12)
MONOCYTES NFR BLD AUTO: 5.7 % (ref 5–12)
MONOCYTES NFR BLD AUTO: 6.6 % (ref 5–12)
MONOCYTES NFR BLD AUTO: 7.8 % (ref 5–12)
MONOCYTES NFR BLD AUTO: 9 % (ref 5–12)
MONOCYTES NFR BLD AUTO: 9.4 % (ref 5–12)
MONOCYTES NFR BLD AUTO: 9.5 % (ref 5–12)
MONOCYTES NFR BLD AUTO: 9.8 % (ref 5–12)
NEUTROPHILS # BLD AUTO: 2.32 10*3/MM3 (ref 1.7–7)
NEUTROPHILS # BLD AUTO: 2.48 10*3/MM3 (ref 1.7–7)
NEUTROPHILS # BLD AUTO: 2.61 10*3/MM3 (ref 1.7–7)
NEUTROPHILS # BLD AUTO: 2.74 10*3/MM3 (ref 1.7–7)
NEUTROPHILS # BLD AUTO: 3.08 10*3/MM3 (ref 1.7–7)
NEUTROPHILS # BLD AUTO: 3.11 10*3/MM3 (ref 1.7–7)
NEUTROPHILS # BLD AUTO: 3.36 10*3/MM3 (ref 1.7–7)
NEUTROPHILS # BLD AUTO: 3.9 10*3/MM3 (ref 1.7–7)
NEUTROPHILS # BLD AUTO: 4.4 10*3/MM3 (ref 1.7–7)
NEUTROPHILS # BLD AUTO: 4.71 10*3/MM3 (ref 1.7–7)
NEUTROPHILS # BLD AUTO: 4.75 10*3/MM3 (ref 1.7–7)
NEUTROPHILS # BLD AUTO: 4.9 10*3/UL (ref 2.3–8.6)
NEUTROPHILS # BLD AUTO: 5.15 10*3/MM3 (ref 1.7–7)
NEUTROPHILS # BLD AUTO: 5.19 10*3/MM3 (ref 1.7–7)
NEUTROPHILS # BLD AUTO: 5.29 10*3/MM3 (ref 1.7–7)
NEUTROPHILS # BLD AUTO: 5.3 10*3/MM3 (ref 1.7–7)
NEUTROPHILS # BLD AUTO: 6.25 10*3/MM3 (ref 1.7–7)
NEUTROPHILS # BLD AUTO: 6.6 10*3/MM3 (ref 1.7–7)
NEUTROPHILS # BLD AUTO: 6.66 10*3/MM3 (ref 1.7–7)
NEUTROPHILS # BLD AUTO: 6.89 10*3/MM3 (ref 1.7–7)
NEUTROPHILS # BLD AUTO: 7.01 10*3/MM3 (ref 1.7–7)
NEUTROPHILS # BLD AUTO: 7.06 10*3/MM3 (ref 1.7–7)
NEUTROPHILS # BLD AUTO: 7.51 10*3/MM3 (ref 1.7–7)
NEUTROPHILS # BLD AUTO: 7.79 10*3/MM3 (ref 1.7–7)
NEUTROPHILS # BLD AUTO: 8.04 10*3/MM3 (ref 1.7–7)
NEUTROPHILS # BLD AUTO: 8.8 10*3/MM3 (ref 1.7–7)
NEUTROPHILS # BLD AUTO: 8.86 10*3/MM3 (ref 1.7–7)
NEUTROPHILS NFR BLD AUTO: 66.2 % (ref 42.7–76)
NEUTROPHILS NFR BLD AUTO: 68.1 % (ref 42.7–76)
NEUTROPHILS NFR BLD AUTO: 70.3 % (ref 42.7–76)
NEUTROPHILS NFR BLD AUTO: 70.5 % (ref 42.7–76)
NEUTROPHILS NFR BLD AUTO: 71.1 % (ref 42.7–76)
NEUTROPHILS NFR BLD AUTO: 71.8 % (ref 42.7–76)
NEUTROPHILS NFR BLD AUTO: 73 % (ref 42.7–76)
NEUTROPHILS NFR BLD AUTO: 73.6 % (ref 42.7–76)
NEUTROPHILS NFR BLD AUTO: 74.2 % (ref 42.7–76)
NEUTROPHILS NFR BLD AUTO: 75.6 % (ref 42.7–76)
NEUTROPHILS NFR BLD AUTO: 75.8 % (ref 42.7–76)
NEUTROPHILS NFR BLD AUTO: 76.7 % (ref 42.7–76)
NEUTROPHILS NFR BLD AUTO: 76.8 % (ref 42.7–76)
NEUTROPHILS NFR BLD AUTO: 77.3 % (ref 42.7–76)
NEUTROPHILS NFR BLD AUTO: 78 % (ref 42.7–76)
NEUTROPHILS NFR BLD AUTO: 79.1 % (ref 42.7–76)
NEUTROPHILS NFR BLD AUTO: 79.2 % (ref 42.7–76)
NEUTROPHILS NFR BLD AUTO: 79.3 % (ref 42.7–76)
NEUTROPHILS NFR BLD AUTO: 80 % (ref 50–75)
NEUTROPHILS NFR BLD AUTO: 81 % (ref 42.7–76)
NEUTROPHILS NFR BLD AUTO: 81.8 % (ref 42.7–76)
NEUTROPHILS NFR BLD AUTO: 83.1 % (ref 42.7–76)
NEUTROPHILS NFR BLD AUTO: 83.8 % (ref 42.7–76)
NEUTROPHILS NFR BLD AUTO: 85.9 % (ref 42.7–76)
NEUTROPHILS NFR BLD AUTO: 86.4 % (ref 42.7–76)
NEUTROPHILS NFR BLD AUTO: 89.2 % (ref 42.7–76)
NEUTROPHILS NFR BLD AUTO: 91 % (ref 42.7–76)
NITRITE UR QL STRIP: NEGATIVE
NITRITE UR QL STRIP: NEGATIVE
NRBC BLD AUTO-RTO: 0 /100 WBC (ref 0–0.2)
NRBC BLD AUTO-RTO: 0 /100 WBC (ref 0–0.2)
NRBC BLD AUTO-RTO: 0 /100{WBCS}
NRBC/RBC NFR BLD MANUAL: 0 10*3/UL
PATH REPORT.FINAL DX SPEC: NORMAL
PH UR STRIP.AUTO: 5.5 [PH] (ref 5–8)
PH UR STRIP.AUTO: 8.5 [PH] (ref 5–8)
PHOSPHATE SERPL-MCNC: 0.9 MG/DL (ref 2.4–4.7)
PHOSPHATE SERPL-MCNC: 1.1 MG/DL (ref 2.4–4.7)
PHOSPHATE SERPL-MCNC: 1.2 MG/DL (ref 2.4–4.7)
PHOSPHATE SERPL-MCNC: 2.1 MG/DL (ref 2.5–4.5)
PLATELET # BLD AUTO: 170 10*3/MM3 (ref 140–450)
PLATELET # BLD AUTO: 195 10*3/MM3 (ref 140–450)
PLATELET # BLD AUTO: 198 10*3/MM3 (ref 140–450)
PLATELET # BLD AUTO: 202 10*3/MM3 (ref 140–450)
PLATELET # BLD AUTO: 210 10*3/MM3 (ref 140–450)
PLATELET # BLD AUTO: 214 10*3/MM3 (ref 140–450)
PLATELET # BLD AUTO: 217 10*3/MM3 (ref 140–450)
PLATELET # BLD AUTO: 217 10*3/MM3 (ref 140–450)
PLATELET # BLD AUTO: 218 10*3/MM3 (ref 140–450)
PLATELET # BLD AUTO: 224 10*3/UL (ref 150–450)
PLATELET # BLD AUTO: 226 10*3/MM3 (ref 140–450)
PLATELET # BLD AUTO: 234 10*3/MM3 (ref 140–450)
PLATELET # BLD AUTO: 243 10*3/MM3 (ref 140–450)
PLATELET # BLD AUTO: 243 10*3/MM3 (ref 140–450)
PLATELET # BLD AUTO: 245 10*3/MM3 (ref 140–450)
PLATELET # BLD AUTO: 247 10*3/MM3 (ref 140–450)
PLATELET # BLD AUTO: 247 10*3/MM3 (ref 140–450)
PLATELET # BLD AUTO: 252 10*3/MM3 (ref 140–450)
PLATELET # BLD AUTO: 254 10*3/MM3 (ref 140–450)
PLATELET # BLD AUTO: 256 10*3/MM3 (ref 140–450)
PLATELET # BLD AUTO: 261 10*3/MM3 (ref 140–450)
PLATELET # BLD AUTO: 261 10*3/MM3 (ref 140–450)
PLATELET # BLD AUTO: 269 10*3/MM3 (ref 140–450)
PLATELET # BLD AUTO: 279 10*3/MM3 (ref 140–450)
PLATELET # BLD AUTO: 279 10*3/MM3 (ref 140–450)
PLATELET # BLD AUTO: 288 10*3/MM3 (ref 140–450)
PLATELET # BLD AUTO: 292 10*3/MM3 (ref 140–450)
PLATELET # BLD AUTO: 296 10*3/MM3 (ref 140–450)
PMV BLD AUTO: 7.5 FL (ref 6–12)
PMV BLD AUTO: 7.7 FL (ref 6–12)
PMV BLD AUTO: 8 FL (ref 7.4–10.4)
PMV BLD AUTO: 8.3 FL (ref 6–12)
PMV BLD AUTO: 8.4 FL (ref 6–12)
PMV BLD AUTO: 8.5 FL (ref 6–12)
PMV BLD AUTO: 8.7 FL (ref 6–12)
PMV BLD AUTO: 8.8 FL (ref 6–12)
PMV BLD AUTO: 9 FL (ref 6–12)
PMV BLD AUTO: 9.1 FL (ref 6–12)
PMV BLD AUTO: 9.2 FL (ref 6–12)
PMV BLD AUTO: 9.3 FL (ref 6–12)
PMV BLD AUTO: 9.3 FL (ref 6–12)
PMV BLD AUTO: 9.4 FL (ref 6–12)
PMV BLD AUTO: 9.5 FL (ref 6–12)
PMV BLD AUTO: 9.5 FL (ref 6–12)
PMV BLD AUTO: 9.6 FL (ref 6–12)
PMV BLD AUTO: 9.6 FL (ref 6–12)
POTASSIUM BLD-SCNC: 3.3 MMOL/L (ref 3.6–5.1)
POTASSIUM BLD-SCNC: 3.4 MMOL/L (ref 3.6–5.1)
POTASSIUM BLD-SCNC: 3.6 MMOL/L (ref 3.5–5.2)
POTASSIUM BLD-SCNC: 3.7 MMOL/L (ref 3.6–5.1)
POTASSIUM BLD-SCNC: 4.1 MMOL/L (ref 3.5–5.2)
POTASSIUM BLD-SCNC: 4.1 MMOL/L (ref 3.6–5.1)
POTASSIUM BLD-SCNC: 4.2 MMOL/L (ref 3.6–5.1)
POTASSIUM BLD-SCNC: 4.3 MMOL/L (ref 3.6–5.1)
POTASSIUM BLD-SCNC: 4.3 MMOL/L (ref 3.6–5.1)
POTASSIUM BLD-SCNC: 4.5 MMOL/L (ref 3.5–5.2)
POTASSIUM BLD-SCNC: 4.6 MMOL/L (ref 3.5–5.2)
POTASSIUM BLD-SCNC: 4.6 MMOL/L (ref 3.5–5.2)
POTASSIUM BLD-SCNC: 4.7 MMOL/L (ref 3.5–5.2)
POTASSIUM BLD-SCNC: 5 MMOL/L (ref 3.5–5.2)
POTASSIUM BLD-SCNC: 5.2 MMOL/L (ref 3.5–5.2)
POTASSIUM BLD-SCNC: 5.3 MMOL/L (ref 3.5–5.2)
POTASSIUM SERPL-SCNC: 3.4 MMOL/L (ref 3.6–5.1)
POTASSIUM SERPL-SCNC: 3.7 MMOL/L (ref 3.6–5.1)
POTASSIUM SERPL-SCNC: 3.8 MMOL/L (ref 3.6–5.1)
POTASSIUM SERPL-SCNC: 3.9 MMOL/L (ref 3.6–5.1)
POTASSIUM SERPL-SCNC: 3.9 MMOL/L (ref 3.6–5.1)
POTASSIUM SERPL-SCNC: 4 MMOL/L (ref 3.6–5.1)
PROT 24H UR-MRATE: 705 MG/24HOURS (ref 0–150)
PROT PATTERN UR ELPH-IMP: NORMAL
PROT SERPL-MCNC: 4.5 G/DL (ref 6–8.5)
PROT SERPL-MCNC: 4.6 G/DL (ref 6–8.5)
PROT SERPL-MCNC: 4.6 G/DL (ref 6–8.5)
PROT SERPL-MCNC: 4.8 G/DL (ref 6–8.5)
PROT SERPL-MCNC: 5.4 G/DL (ref 6.1–7.9)
PROT SERPL-MCNC: 5.4 G/DL (ref 6.1–7.9)
PROT SERPL-MCNC: 5.5 G/DL (ref 6.1–7.9)
PROT SERPL-MCNC: 5.5 G/DL (ref 6.1–7.9)
PROT SERPL-MCNC: 5.7 G/DL (ref 6.1–7.9)
PROT SERPL-MCNC: 5.7 G/DL (ref 6.1–7.9)
PROT SERPL-MCNC: 5.8 G/DL (ref 6.1–7.9)
PROT SERPL-MCNC: 5.8 G/DL (ref 6.1–7.9)
PROT SERPL-MCNC: 5.9 G/DL (ref 6.1–7.9)
PROT SERPL-MCNC: 6 G/DL (ref 6–8.5)
PROT SERPL-MCNC: 6.1 G/DL (ref 6.1–7.9)
PROT SERPL-MCNC: 6.1 G/DL (ref 6.1–7.9)
PROT SERPL-MCNC: 6.2 G/DL (ref 6.1–7.9)
PROT SERPL-MCNC: 6.3 G/DL (ref 6–8.5)
PROT SERPL-MCNC: 6.7 G/DL (ref 6–8.5)
PROT UR QL STRIP: ABNORMAL
PROT UR QL STRIP: ABNORMAL
PROT UR-MCNC: 166 MG/DL
PROT UR-MCNC: 379 MG/DL
PTH-INTACT SERPL-MCNC: 67 PG/ML (ref 11–72)
RBC # BLD AUTO: 2.94 10*6/MM3 (ref 3.77–5.28)
RBC # BLD AUTO: 3.12 10*6/MM3 (ref 3.77–5.28)
RBC # BLD AUTO: 3.18 10*6/MM3 (ref 3.77–5.28)
RBC # BLD AUTO: 3.21 10*6/MM3 (ref 3.77–5.28)
RBC # BLD AUTO: 3.26 10*6/MM3 (ref 3.77–5.28)
RBC # BLD AUTO: 3.29 10*6/MM3 (ref 3.77–5.28)
RBC # BLD AUTO: 3.29 10*6/UL (ref 4–5.4)
RBC # BLD AUTO: 3.31 10*6/MM3 (ref 3.77–5.28)
RBC # BLD AUTO: 3.32 10*6/MM3 (ref 3.77–5.28)
RBC # BLD AUTO: 3.45 10*6/MM3 (ref 3.77–5.28)
RBC # BLD AUTO: 3.52 10*6/MM3 (ref 3.77–5.28)
RBC # BLD AUTO: 3.66 10*6/MM3 (ref 3.77–5.28)
RBC # BLD AUTO: 3.78 10*6/MM3 (ref 3.77–5.28)
RBC # BLD AUTO: 3.92 10*6/MM3 (ref 3.77–5.28)
RBC # BLD AUTO: 3.99 10*6/MM3 (ref 3.77–5.28)
RBC # BLD AUTO: 4.02 10*6/MM3 (ref 3.77–5.28)
RBC # BLD AUTO: 4.06 10*6/MM3 (ref 3.77–5.28)
RBC # BLD AUTO: 4.11 10*6/MM3 (ref 3.77–5.28)
RBC # BLD AUTO: 4.11 10*6/MM3 (ref 3.77–5.28)
RBC # BLD AUTO: 4.13 10*6/MM3 (ref 3.77–5.28)
RBC # BLD AUTO: 4.16 10*6/MM3 (ref 3.77–5.28)
RBC # BLD AUTO: 4.31 10*6/MM3 (ref 3.77–5.28)
RBC # BLD AUTO: 4.52 10*6/MM3 (ref 3.77–5.28)
RBC # BLD AUTO: 4.68 10*6/MM3 (ref 3.77–5.28)
RBC # BLD AUTO: 4.75 10*6/MM3 (ref 3.77–5.28)
RBC # BLD AUTO: 4.89 10*6/MM3 (ref 3.77–5.28)
RBC # BLD AUTO: 5.01 10*6/MM3 (ref 3.77–5.28)
RBC # BLD AUTO: 5.55 10*6/MM3 (ref 3.77–5.28)
RBC # UR STRIP: NORMAL /HPF
RBC # UR: ABNORMAL /HPF
RBC CASTS #/AREA URNS LPF: NORMAL /LPF
REF LAB TEST METHOD: ABNORMAL
REF LAB TEST METHOD: NORMAL
RENAL EPI CELLS #/AREA URNS HPF: NORMAL /HPF
RETICS/RBC NFR MANUAL: 0.04 10*6/UL
RETICS/RBC NFR MANUAL: 0.08 10*6/UL
SODIUM BLD-SCNC: 133 MMOL/L (ref 136–144)
SODIUM BLD-SCNC: 135 MMOL/L (ref 136–144)
SODIUM BLD-SCNC: 135 MMOL/L (ref 136–144)
SODIUM BLD-SCNC: 136 MMOL/L (ref 136–144)
SODIUM BLD-SCNC: 137 MMOL/L (ref 136–144)
SODIUM BLD-SCNC: 137 MMOL/L (ref 136–144)
SODIUM BLD-SCNC: 138 MMOL/L (ref 136–144)
SODIUM BLD-SCNC: 139 MMOL/L (ref 136–144)
SODIUM BLD-SCNC: 139 MMOL/L (ref 136–144)
SODIUM BLD-SCNC: 139 MMOL/L (ref 136–145)
SODIUM BLD-SCNC: 140 MMOL/L (ref 136–145)
SODIUM BLD-SCNC: 140 MMOL/L (ref 136–145)
SODIUM BLD-SCNC: 141 MMOL/L (ref 136–144)
SODIUM BLD-SCNC: 141 MMOL/L (ref 136–145)
SODIUM BLD-SCNC: 142 MMOL/L (ref 136–145)
SODIUM BLD-SCNC: 144 MMOL/L (ref 136–145)
SODIUM BLD-SCNC: 147 MMOL/L (ref 136–145)
SODIUM SERPL-SCNC: 131 MMOL/L (ref 136–144)
SODIUM SERPL-SCNC: 132 MMOL/L (ref 136–144)
SODIUM SERPL-SCNC: 132 MMOL/L (ref 136–144)
SODIUM SERPL-SCNC: 134 MMOL/L (ref 136–144)
SODIUM SERPL-SCNC: 136 MMOL/L (ref 136–144)
SP GR UR STRIP: 1.01 (ref 1–1.03)
SP GR UR STRIP: 1.02 (ref 1–1.03)
SPECIMEN VOL 24H UR: 500 ML
SQUAMOUS #/AREA URNS HPF: ABNORMAL /HPF
SQUAMOUS #/AREA URNS HPF: NORMAL /HPF
T4 FREE SERPL-MCNC: 1.3 NG/DL (ref 0.58–1.64)
T4 FREE SERPL-MCNC: 1.37 NG/DL (ref 0.93–1.7)
T4 FREE SERPL-MCNC: 1.4 NG/DL (ref 0.58–1.64)
T4 FREE SERPL-MCNC: 1.4 NG/DL (ref 0.93–1.7)
T4 FREE SERPL-MCNC: 1.42 NG/DL (ref 0.58–1.64)
TIBC SERPL-MCNC: 155 MCG/DL (ref 228–428)
TIBC SERPL-MCNC: 197 MCG/DL (ref 228–428)
TIBC SERPL-MCNC: 235 UG/DL (ref 228–428)
TIBC SERPL-MCNC: 241 UG/DL (ref 228–428)
TRANS CELLS #/AREA URNS HPF: NORMAL /HPF
TRANSFERRIN SERPL-MCNC: 111 MG/DL (ref 190–380)
TRANSFERRIN SERPL-MCNC: 141 MG/DL (ref 200–360)
TRIGL SERPL-MCNC: 236 MG/DL
TRIGL SERPL-MCNC: 266 MG/DL
TRIGL SERPL-MCNC: 315 MG/DL
TSH SERPL DL<=0.05 MIU/L-ACNC: 1.07 MIU/ML (ref 0.34–5.6)
TSH SERPL DL<=0.05 MIU/L-ACNC: 2.03 UIU/ML (ref 0.27–4.2)
TSH SERPL DL<=0.05 MIU/L-ACNC: 3.03 MIU/ML (ref 0.34–5.6)
TSH SERPL DL<=0.05 MIU/L-ACNC: 3.25 UIU/ML (ref 0.27–4.2)
TSH SERPL DL<=0.05 MIU/L-ACNC: 4.03 UIU/ML (ref 0.34–5.6)
TSH SERPL DL<=0.05 MIU/L-ACNC: 6.02 MIU/ML (ref 0.34–5.6)
URATE CRY URNS QL MICRO: NORMAL /HPF
URATE SERPL-MCNC: 7 MG/DL (ref 2.6–8)
UROBILINOGEN UR QL STRIP: ABNORMAL
UROBILINOGEN UR QL STRIP: ABNORMAL
VIT B1 BLD-SCNC: 100.4 NMOL/L (ref 66.5–200)
VIT B6 SERPL-MCNC: 3.3 UG/L (ref 2–32.8)
VLDLC SERPL CALC-MCNC: 32.5 MG/DL
VLDLC SERPL CALC-MCNC: 38.1 MG/DL
VLDLC SERPL CALC-MCNC: 39.8 MG/DL
WAXY CASTS #/AREA URNS LPF: NORMAL /LPF
WBC # BLD AUTO: 6.1 10*3/UL (ref 4.5–11.5)
WBC # UR STRIP: NORMAL /HPF
WBC CASTS #/AREA URNS LPF: NORMAL /LPF
WBC NRBC COR # BLD: 10.17 10*3/MM3 (ref 3.4–10.8)
WBC NRBC COR # BLD: 10.31 10*3/MM3 (ref 3.4–10.8)
WBC NRBC COR # BLD: 11.12 10*3/MM3 (ref 3.4–10.8)
WBC NRBC COR # BLD: 3.18 10*3/MM3 (ref 3.4–10.8)
WBC NRBC COR # BLD: 3.19 10*3/MM3 (ref 3.4–10.8)
WBC NRBC COR # BLD: 3.23 10*3/MM3 (ref 3.4–10.8)
WBC NRBC COR # BLD: 3.3 10*3/MM3 (ref 3.4–10.8)
WBC NRBC COR # BLD: 3.85 10*3/MM3 (ref 3.4–10.8)
WBC NRBC COR # BLD: 4.19 10*3/MM3 (ref 3.4–10.8)
WBC NRBC COR # BLD: 4.42 10*3/MM3 (ref 3.4–10.8)
WBC NRBC COR # BLD: 4.45 10*3/MM3 (ref 3.4–10.8)
WBC NRBC COR # BLD: 4.92 10*3/MM3 (ref 3.4–10.8)
WBC NRBC COR # BLD: 5 10*3/MM3 (ref 3.4–10.8)
WBC NRBC COR # BLD: 5.81 10*3/MM3 (ref 3.4–10.8)
WBC NRBC COR # BLD: 6.45 10*3/MM3 (ref 3.4–10.8)
WBC NRBC COR # BLD: 6.74 10*3/MM3 (ref 3.4–10.8)
WBC NRBC COR # BLD: 6.99 10*3/MM3 (ref 3.4–10.8)
WBC NRBC COR # BLD: 7.62 10*3/MM3 (ref 3.4–10.8)
WBC NRBC COR # BLD: 7.72 10*3/MM3 (ref 3.4–10.8)
WBC NRBC COR # BLD: 7.79 10*3/MM3 (ref 3.4–10.8)
WBC NRBC COR # BLD: 7.8 10*3/MM3 (ref 3.4–10.8)
WBC NRBC COR # BLD: 8.18 10*3/MM3 (ref 3.4–10.8)
WBC NRBC COR # BLD: 8.68 10*3/MM3 (ref 3.4–10.8)
WBC NRBC COR # BLD: 8.87 10*3/MM3 (ref 3.4–10.8)
WBC NRBC COR # BLD: 9.28 10*3/MM3 (ref 3.4–10.8)
WBC NRBC COR # BLD: 9.69 10*3/MM3 (ref 3.4–10.8)
WBC NRBC COR # BLD: 9.72 10*3/MM3 (ref 3.4–10.8)
WBC UR QL AUTO: ABNORMAL /HPF

## 2019-01-01 PROCEDURE — 96413 CHEMO IV INFUSION 1 HR: CPT

## 2019-01-01 PROCEDURE — 25010000002 EPOETIN ALFA PER 1000 UNITS: Performed by: NURSE PRACTITIONER

## 2019-01-01 PROCEDURE — 96366 THER/PROPH/DIAG IV INF ADDON: CPT

## 2019-01-01 PROCEDURE — 84439 ASSAY OF FREE THYROXINE: CPT | Performed by: INTERNAL MEDICINE

## 2019-01-01 PROCEDURE — 77412 RADIATION TX DELIVERY LVL 3: CPT | Performed by: RADIOLOGY

## 2019-01-01 PROCEDURE — 85025 COMPLETE CBC W/AUTO DIFF WBC: CPT | Performed by: INTERNAL MEDICINE

## 2019-01-01 PROCEDURE — G0463 HOSPITAL OUTPT CLINIC VISIT: HCPCS | Performed by: INTERNAL MEDICINE

## 2019-01-01 PROCEDURE — 82570 ASSAY OF URINE CREATININE: CPT | Performed by: INTERNAL MEDICINE

## 2019-01-01 PROCEDURE — 88342 IMHCHEM/IMCYTCHM 1ST ANTB: CPT | Performed by: INTERNAL MEDICINE

## 2019-01-01 PROCEDURE — 82272 OCCULT BLD FECES 1-3 TESTS: CPT | Performed by: NURSE PRACTITIONER

## 2019-01-01 PROCEDURE — 96360 HYDRATION IV INFUSION INIT: CPT | Performed by: INTERNAL MEDICINE

## 2019-01-01 PROCEDURE — 25010000002 FERRIC CARBOXYMALTOSE 750 MG/15ML SOLUTION 15 ML VIAL: Performed by: NURSE PRACTITIONER

## 2019-01-01 PROCEDURE — 25010000002 IPILIMUMAB 50 MG/10ML SOLUTION 10 ML VIAL: Performed by: NURSE PRACTITIONER

## 2019-01-01 PROCEDURE — 96367 TX/PROPH/DG ADDL SEQ IV INF: CPT | Performed by: INTERNAL MEDICINE

## 2019-01-01 PROCEDURE — 80053 COMPREHEN METABOLIC PANEL: CPT | Performed by: INTERNAL MEDICINE

## 2019-01-01 PROCEDURE — 36415 COLL VENOUS BLD VENIPUNCTURE: CPT | Performed by: INTERNAL MEDICINE

## 2019-01-01 PROCEDURE — 63710000001 INSULIN GLARGINE PER 5 UNITS: Performed by: INTERNAL MEDICINE

## 2019-01-01 PROCEDURE — 25010000002 DENOSUMAB 120 MG/1.7ML SOLUTION: Performed by: INTERNAL MEDICINE

## 2019-01-01 PROCEDURE — 25010000002 TEMSIROLIMUS PER 1 MG: Performed by: NURSE PRACTITIONER

## 2019-01-01 PROCEDURE — 84156 ASSAY OF PROTEIN URINE: CPT | Performed by: NURSE PRACTITIONER

## 2019-01-01 PROCEDURE — 36415 COLL VENOUS BLD VENIPUNCTURE: CPT

## 2019-01-01 PROCEDURE — 25010000002 DIPHENHYDRAMINE PER 50 MG: Performed by: NURSE PRACTITIONER

## 2019-01-01 PROCEDURE — 85025 COMPLETE CBC W/AUTO DIFF WBC: CPT | Performed by: NURSE PRACTITIONER

## 2019-01-01 PROCEDURE — 99215 OFFICE O/P EST HI 40 MIN: CPT | Performed by: INTERNAL MEDICINE

## 2019-01-01 PROCEDURE — 25010000002 CALCIUM GLUCONATE PER 10 ML: Performed by: INTERNAL MEDICINE

## 2019-01-01 PROCEDURE — 36415 COLL VENOUS BLD VENIPUNCTURE: CPT | Performed by: NURSE PRACTITIONER

## 2019-01-01 PROCEDURE — 84443 ASSAY THYROID STIM HORMONE: CPT | Performed by: NURSE PRACTITIONER

## 2019-01-01 PROCEDURE — 96361 HYDRATE IV INFUSION ADD-ON: CPT | Performed by: INTERNAL MEDICINE

## 2019-01-01 PROCEDURE — 36591 DRAW BLOOD OFF VENOUS DEVICE: CPT | Performed by: INTERNAL MEDICINE

## 2019-01-01 PROCEDURE — 93296 REM INTERROG EVL PM/IDS: CPT | Performed by: NURSE PRACTITIONER

## 2019-01-01 PROCEDURE — 88341 IMHCHEM/IMCYTCHM EA ADD ANTB: CPT | Performed by: INTERNAL MEDICINE

## 2019-01-01 PROCEDURE — 25010000002 IPILIMUMAB 50 MG/10ML SOLUTION 10 ML VIAL: Performed by: INTERNAL MEDICINE

## 2019-01-01 PROCEDURE — 80053 COMPREHEN METABOLIC PANEL: CPT | Performed by: NURSE PRACTITIONER

## 2019-01-01 PROCEDURE — 82533 TOTAL CORTISOL: CPT | Performed by: NURSE PRACTITIONER

## 2019-01-01 PROCEDURE — 96413 CHEMO IV INFUSION 1 HR: CPT | Performed by: INTERNAL MEDICINE

## 2019-01-01 PROCEDURE — 73060 X-RAY EXAM OF HUMERUS: CPT

## 2019-01-01 PROCEDURE — 84439 ASSAY OF FREE THYROXINE: CPT | Performed by: NURSE PRACTITIONER

## 2019-01-01 PROCEDURE — 25010000002 NIVOLUMAB 240 MG/24ML SOLUTION 24 ML VIAL: Performed by: NURSE PRACTITIONER

## 2019-01-01 PROCEDURE — 83540 ASSAY OF IRON: CPT | Performed by: NURSE PRACTITIONER

## 2019-01-01 PROCEDURE — 84425 ASSAY OF VITAMIN B-1: CPT | Performed by: NURSE PRACTITIONER

## 2019-01-01 PROCEDURE — 25010000002 DIPHENHYDRAMINE PER 50 MG: Performed by: INTERNAL MEDICINE

## 2019-01-01 PROCEDURE — 83735 ASSAY OF MAGNESIUM: CPT | Performed by: NURSE PRACTITIONER

## 2019-01-01 PROCEDURE — 99284 EMERGENCY DEPT VISIT MOD MDM: CPT

## 2019-01-01 PROCEDURE — 25010000002 EPOETIN ALFA PER 1000 UNITS: Performed by: INTERNAL MEDICINE

## 2019-01-01 PROCEDURE — 96365 THER/PROPH/DIAG IV INF INIT: CPT

## 2019-01-01 PROCEDURE — 83735 ASSAY OF MAGNESIUM: CPT | Performed by: FAMILY MEDICINE

## 2019-01-01 PROCEDURE — 80053 COMPREHEN METABOLIC PANEL: CPT

## 2019-01-01 PROCEDURE — 77334 RADIATION TREATMENT AID(S): CPT | Performed by: RADIOLOGY

## 2019-01-01 PROCEDURE — 93005 ELECTROCARDIOGRAM TRACING: CPT

## 2019-01-01 PROCEDURE — G0378 HOSPITAL OBSERVATION PER HR: HCPCS

## 2019-01-01 PROCEDURE — 84207 ASSAY OF VITAMIN B-6: CPT | Performed by: NURSE PRACTITIONER

## 2019-01-01 PROCEDURE — 81001 URINALYSIS AUTO W/SCOPE: CPT | Performed by: INTERNAL MEDICINE

## 2019-01-01 PROCEDURE — 25010000002 CALCIUM GLUCONATE 2-0.675 GM/100ML-% SOLUTION: Performed by: NURSE PRACTITIONER

## 2019-01-01 PROCEDURE — 84100 ASSAY OF PHOSPHORUS: CPT | Performed by: NURSE PRACTITIONER

## 2019-01-01 PROCEDURE — 73070 X-RAY EXAM OF ELBOW: CPT

## 2019-01-01 PROCEDURE — 93005 ELECTROCARDIOGRAM TRACING: CPT | Performed by: INTERNAL MEDICINE

## 2019-01-01 PROCEDURE — 73090 X-RAY EXAM OF FOREARM: CPT

## 2019-01-01 PROCEDURE — 77417 THER RADIOLOGY PORT IMAGE(S): CPT | Performed by: RADIOLOGY

## 2019-01-01 PROCEDURE — 82306 VITAMIN D 25 HYDROXY: CPT

## 2019-01-01 PROCEDURE — 96372 THER/PROPH/DIAG INJ SC/IM: CPT | Performed by: INTERNAL MEDICINE

## 2019-01-01 PROCEDURE — 82728 ASSAY OF FERRITIN: CPT | Performed by: INTERNAL MEDICINE

## 2019-01-01 PROCEDURE — 99213 OFFICE O/P EST LOW 20 MIN: CPT | Performed by: NURSE PRACTITIONER

## 2019-01-01 PROCEDURE — 96375 TX/PRO/DX INJ NEW DRUG ADDON: CPT | Performed by: INTERNAL MEDICINE

## 2019-01-01 PROCEDURE — 73200 CT UPPER EXTREMITY W/O DYE: CPT

## 2019-01-01 PROCEDURE — 84443 ASSAY THYROID STIM HORMONE: CPT | Performed by: INTERNAL MEDICINE

## 2019-01-01 PROCEDURE — 72192 CT PELVIS W/O DYE: CPT

## 2019-01-01 PROCEDURE — G0463 HOSPITAL OUTPT CLINIC VISIT: HCPCS | Performed by: RADIOLOGY

## 2019-01-01 PROCEDURE — 86335 IMMUNFIX E-PHORSIS/URINE/CSF: CPT | Performed by: INTERNAL MEDICINE

## 2019-01-01 PROCEDURE — 93294 REM INTERROG EVL PM/LDLS PM: CPT | Performed by: NURSE PRACTITIONER

## 2019-01-01 PROCEDURE — 77295 3-D RADIOTHERAPY PLAN: CPT | Performed by: RADIOLOGY

## 2019-01-01 PROCEDURE — 25010000002 NIVOLUMAB 240 MG/24ML SOLUTION 24 ML VIAL: Performed by: INTERNAL MEDICINE

## 2019-01-01 PROCEDURE — 96417 CHEMO IV INFUS EACH ADDL SEQ: CPT | Performed by: INTERNAL MEDICINE

## 2019-01-01 PROCEDURE — 85027 COMPLETE CBC AUTOMATED: CPT

## 2019-01-01 PROCEDURE — 0 IOPAMIDOL PER 1 ML: Performed by: NURSE PRACTITIONER

## 2019-01-01 PROCEDURE — 82533 TOTAL CORTISOL: CPT | Performed by: INTERNAL MEDICINE

## 2019-01-01 PROCEDURE — 87449 NOS EACH ORGANISM AG IA: CPT | Performed by: NURSE PRACTITIONER

## 2019-01-01 PROCEDURE — 83540 ASSAY OF IRON: CPT

## 2019-01-01 PROCEDURE — 83970 ASSAY OF PARATHORMONE: CPT

## 2019-01-01 PROCEDURE — 84166 PROTEIN E-PHORESIS/URINE/CSF: CPT | Performed by: INTERNAL MEDICINE

## 2019-01-01 PROCEDURE — 99217 PR OBSERVATION CARE DISCHARGE MANAGEMENT: CPT | Performed by: INTERNAL MEDICINE

## 2019-01-01 PROCEDURE — 93971 EXTREMITY STUDY: CPT

## 2019-01-01 PROCEDURE — 82330 ASSAY OF CALCIUM: CPT | Performed by: NURSE PRACTITIONER

## 2019-01-01 PROCEDURE — 83540 ASSAY OF IRON: CPT | Performed by: INTERNAL MEDICINE

## 2019-01-01 PROCEDURE — 83735 ASSAY OF MAGNESIUM: CPT | Performed by: INTERNAL MEDICINE

## 2019-01-01 PROCEDURE — 81050 URINALYSIS VOLUME MEASURE: CPT | Performed by: NURSE PRACTITIONER

## 2019-01-01 PROCEDURE — 74177 CT ABD & PELVIS W/CONTRAST: CPT

## 2019-01-01 PROCEDURE — 93280 PM DEVICE PROGR EVAL DUAL: CPT | Performed by: NURSE PRACTITIONER

## 2019-01-01 PROCEDURE — 77336 RADIATION PHYSICS CONSULT: CPT | Performed by: RADIOLOGY

## 2019-01-01 PROCEDURE — 71260 CT THORAX DX C+: CPT

## 2019-01-01 PROCEDURE — 84466 ASSAY OF TRANSFERRIN: CPT | Performed by: NURSE PRACTITIONER

## 2019-01-01 PROCEDURE — 99220 PR INITIAL OBSERVATION CARE/DAY 70 MINUTES: CPT | Performed by: INTERNAL MEDICINE

## 2019-01-01 PROCEDURE — 81001 URINALYSIS AUTO W/SCOPE: CPT | Performed by: NURSE PRACTITIONER

## 2019-01-01 PROCEDURE — 82550 ASSAY OF CK (CPK): CPT

## 2019-01-01 PROCEDURE — 96372 THER/PROPH/DIAG INJ SC/IM: CPT | Performed by: NURSE PRACTITIONER

## 2019-01-01 PROCEDURE — 96367 TX/PROPH/DG ADDL SEQ IV INF: CPT

## 2019-01-01 PROCEDURE — 25010000002 CALCIUM GLUCONATE PER 10 ML: Performed by: NURSE PRACTITIONER

## 2019-01-01 PROCEDURE — 77280 THER RAD SIMULAJ FIELD SMPL: CPT | Performed by: RADIOLOGY

## 2019-01-01 PROCEDURE — 80048 BASIC METABOLIC PNL TOTAL CA: CPT | Performed by: NURSE PRACTITIONER

## 2019-01-01 PROCEDURE — 82728 ASSAY OF FERRITIN: CPT | Performed by: NURSE PRACTITIONER

## 2019-01-01 PROCEDURE — 87324 CLOSTRIDIUM AG IA: CPT | Performed by: NURSE PRACTITIONER

## 2019-01-01 PROCEDURE — 63710000001 PREDNISONE PER 5 MG: Performed by: INTERNAL MEDICINE

## 2019-01-01 PROCEDURE — 84550 ASSAY OF BLOOD/URIC ACID: CPT

## 2019-01-01 PROCEDURE — 84100 ASSAY OF PHOSPHORUS: CPT

## 2019-01-01 PROCEDURE — 82652 VIT D 1 25-DIHYDROXY: CPT | Performed by: NURSE PRACTITIONER

## 2019-01-01 PROCEDURE — 88305 TISSUE EXAM BY PATHOLOGIST: CPT | Performed by: INTERNAL MEDICINE

## 2019-01-01 PROCEDURE — 84443 ASSAY THYROID STIM HORMONE: CPT

## 2019-01-01 PROCEDURE — 84466 ASSAY OF TRANSFERRIN: CPT | Performed by: INTERNAL MEDICINE

## 2019-01-01 PROCEDURE — 96365 THER/PROPH/DIAG IV INF INIT: CPT | Performed by: INTERNAL MEDICINE

## 2019-01-01 PROCEDURE — 83735 ASSAY OF MAGNESIUM: CPT

## 2019-01-01 PROCEDURE — 77290 THER RAD SIMULAJ FIELD CPLX: CPT | Performed by: RADIOLOGY

## 2019-01-01 PROCEDURE — 77300 RADIATION THERAPY DOSE PLAN: CPT | Performed by: RADIOLOGY

## 2019-01-01 PROCEDURE — 82330 ASSAY OF CALCIUM: CPT | Performed by: FAMILY MEDICINE

## 2019-01-01 PROCEDURE — 71045 X-RAY EXAM CHEST 1 VIEW: CPT

## 2019-01-01 PROCEDURE — 96375 TX/PRO/DX INJ NEW DRUG ADDON: CPT

## 2019-01-01 PROCEDURE — 25010000002 MAGNESIUM SULFATE 2 GM/50ML SOLUTION: Performed by: FAMILY MEDICINE

## 2019-01-01 RX ORDER — SENNA PLUS 8.6 MG/1
1 TABLET ORAL NIGHTLY
Status: DISCONTINUED | OUTPATIENT
Start: 2019-01-01 | End: 2019-01-01 | Stop reason: HOSPADM

## 2019-01-01 RX ORDER — DIPHENHYDRAMINE HYDROCHLORIDE 50 MG/ML
50 INJECTION INTRAMUSCULAR; INTRAVENOUS ONCE
Status: DISCONTINUED | OUTPATIENT
Start: 2019-01-01 | End: 2019-01-01

## 2019-01-01 RX ORDER — SODIUM CHLORIDE 9 MG/ML
250 INJECTION, SOLUTION INTRAVENOUS ONCE
Status: CANCELLED | OUTPATIENT
Start: 2019-01-01

## 2019-01-01 RX ORDER — ONDANSETRON HYDROCHLORIDE 8 MG/1
TABLET, FILM COATED ORAL EVERY 8 HOURS PRN
COMMUNITY
End: 2019-01-01 | Stop reason: SDUPTHER

## 2019-01-01 RX ORDER — SODIUM CHLORIDE 450 MG/100ML
500 INJECTION, SOLUTION INTRAVENOUS ONCE
Status: COMPLETED | OUTPATIENT
Start: 2019-01-01 | End: 2019-01-01

## 2019-01-01 RX ORDER — FENTANYL 100 UG/H
1 PATCH TRANSDERMAL
Qty: 10 PATCH | Refills: 0 | Status: SHIPPED | OUTPATIENT
Start: 2019-01-01 | End: 2019-01-01

## 2019-01-01 RX ORDER — ACETAMINOPHEN 160 MG/5ML
325 SOLUTION ORAL EVERY 4 HOURS PRN
Status: DISCONTINUED | OUTPATIENT
Start: 2019-01-01 | End: 2019-01-01 | Stop reason: HOSPADM

## 2019-01-01 RX ORDER — SODIUM CHLORIDE 0.9 % (FLUSH) 0.9 %
10 SYRINGE (ML) INJECTION EVERY 12 HOURS SCHEDULED
Status: DISCONTINUED | OUTPATIENT
Start: 2019-01-01 | End: 2019-01-01 | Stop reason: HOSPADM

## 2019-01-01 RX ORDER — SODIUM CHLORIDE 0.9 % (FLUSH) 0.9 %
10 SYRINGE (ML) INJECTION AS NEEDED
Status: CANCELLED | OUTPATIENT
Start: 2019-01-01

## 2019-01-01 RX ORDER — DEXTROSE AND SODIUM CHLORIDE 5; .45 G/100ML; G/100ML
500 INJECTION, SOLUTION INTRAVENOUS CONTINUOUS
Status: DISCONTINUED | OUTPATIENT
Start: 2019-01-01 | End: 2019-01-01

## 2019-01-01 RX ORDER — LANOLIN ALCOHOL/MO/W.PET/CERES
1000 CREAM (GRAM) TOPICAL DAILY
Status: DISCONTINUED | OUTPATIENT
Start: 2019-01-01 | End: 2019-01-01 | Stop reason: HOSPADM

## 2019-01-01 RX ORDER — CALCIUM CARBONATE 200(500)MG
3 TABLET,CHEWABLE ORAL 3 TIMES DAILY
Status: DISCONTINUED | OUTPATIENT
Start: 2019-01-01 | End: 2019-01-01

## 2019-01-01 RX ORDER — PANTOPRAZOLE SODIUM 40 MG/1
40 TABLET, DELAYED RELEASE ORAL EVERY MORNING
Status: DISCONTINUED | OUTPATIENT
Start: 2019-01-01 | End: 2019-01-01 | Stop reason: HOSPADM

## 2019-01-01 RX ORDER — PROMETHAZINE HYDROCHLORIDE 25 MG/1
TABLET ORAL
Qty: 20 TABLET | Refills: 2 | Status: SHIPPED | OUTPATIENT
Start: 2019-01-01 | End: 2019-01-01 | Stop reason: SINTOL

## 2019-01-01 RX ORDER — SODIUM CHLORIDE 0.9 % (FLUSH) 0.9 %
10 SYRINGE (ML) INJECTION AS NEEDED
Status: DISCONTINUED | OUTPATIENT
Start: 2019-01-01 | End: 2019-01-01 | Stop reason: HOSPADM

## 2019-01-01 RX ORDER — POTASSIUM CHLORIDE 750 MG/1
10 TABLET, EXTENDED RELEASE ORAL DAILY
Qty: 30 TABLET | Refills: 3 | Status: SHIPPED | OUTPATIENT
Start: 2019-01-01 | End: 2019-01-01 | Stop reason: SDUPTHER

## 2019-01-01 RX ORDER — FENOFIBRATE 160 MG/1
TABLET ORAL EVERY 24 HOURS
COMMUNITY
Start: 2019-01-07 | End: 2019-01-01

## 2019-01-01 RX ORDER — ONDANSETRON HYDROCHLORIDE 8 MG/1
TABLET, FILM COATED ORAL
Qty: 20 TABLET | Refills: 1 | OUTPATIENT
Start: 2019-01-01

## 2019-01-01 RX ORDER — POTASSIUM CHLORIDE 750 MG/1
10 TABLET, EXTENDED RELEASE ORAL DAILY
Qty: 30 TABLET | Refills: 3 | Status: SHIPPED | OUTPATIENT
Start: 2019-01-01 | End: 2020-01-01

## 2019-01-01 RX ORDER — PREDNISONE 10 MG/1
10 TABLET ORAL TAKE AS DIRECTED
Qty: 47 TABLET | Refills: 0 | Status: SHIPPED | OUTPATIENT
Start: 2019-01-01 | End: 2019-01-01

## 2019-01-01 RX ORDER — CARVEDILOL 3.12 MG/1
TABLET ORAL
Qty: 60 TABLET | Refills: 2 | Status: SHIPPED | OUTPATIENT
Start: 2019-01-01 | End: 2019-01-01

## 2019-01-01 RX ORDER — DIPHENOXYLATE HYDROCHLORIDE AND ATROPINE SULFATE 2.5; .025 MG/1; MG/1
TABLET ORAL
Qty: 20 TABLET | Refills: 0 | Status: SHIPPED | OUTPATIENT
Start: 2019-01-01 | End: 2019-01-01 | Stop reason: SDUPTHER

## 2019-01-01 RX ORDER — SODIUM CHLORIDE 9 MG/ML
250 INJECTION, SOLUTION INTRAVENOUS ONCE
Status: COMPLETED | OUTPATIENT
Start: 2019-01-01 | End: 2019-01-01

## 2019-01-01 RX ORDER — SODIUM CHLORIDE 450 MG/100ML
333 INJECTION, SOLUTION INTRAVENOUS ONCE
Status: CANCELLED | OUTPATIENT
Start: 2019-01-01 | End: 2019-01-01

## 2019-01-01 RX ORDER — PREDNISONE 10 MG/1
30 TABLET ORAL 2 TIMES DAILY
Qty: 180 TABLET | Refills: 0 | Status: SHIPPED | OUTPATIENT
Start: 2019-01-01 | End: 2019-01-01 | Stop reason: SDUPTHER

## 2019-01-01 RX ORDER — LIDOCAINE AND PRILOCAINE 25; 25 MG/G; MG/G
CREAM TOPICAL
Qty: 1 EACH | Refills: 2 | Status: SHIPPED | OUTPATIENT
Start: 2019-01-01 | End: 2020-01-01

## 2019-01-01 RX ORDER — ASCORBIC ACID 500 MG
1000 TABLET ORAL DAILY
COMMUNITY
End: 2019-01-01 | Stop reason: HOSPADM

## 2019-01-01 RX ORDER — HYDRALAZINE HYDROCHLORIDE 25 MG/1
100 TABLET, FILM COATED ORAL 3 TIMES DAILY
Status: DISCONTINUED | OUTPATIENT
Start: 2019-01-01 | End: 2019-01-01 | Stop reason: HOSPADM

## 2019-01-01 RX ORDER — RIVAROXABAN 20 MG/1
TABLET, FILM COATED ORAL
Qty: 30 TABLET | Refills: 3 | Status: SHIPPED | OUTPATIENT
Start: 2019-01-01 | End: 2020-01-01

## 2019-01-01 RX ORDER — CALCIUM CARBONATE 200(500)MG
1 TABLET,CHEWABLE ORAL 2 TIMES DAILY
Qty: 60 TABLET | Refills: 3
Start: 2019-01-01 | End: 2019-01-01 | Stop reason: HOSPADM

## 2019-01-01 RX ORDER — SODIUM CHLORIDE 450 MG/100ML
333 INJECTION, SOLUTION INTRAVENOUS ONCE
Status: COMPLETED | OUTPATIENT
Start: 2019-01-01 | End: 2019-01-01

## 2019-01-01 RX ORDER — MAGNESIUM SULFATE HEPTAHYDRATE 40 MG/ML
2 INJECTION, SOLUTION INTRAVENOUS ONCE
Status: CANCELLED | OUTPATIENT
Start: 2019-01-01

## 2019-01-01 RX ORDER — ONDANSETRON HYDROCHLORIDE 8 MG/1
TABLET, FILM COATED ORAL
Qty: 20 TABLET | Refills: 1 | Status: SHIPPED | OUTPATIENT
Start: 2019-01-01 | End: 2019-01-01 | Stop reason: SDUPTHER

## 2019-01-01 RX ORDER — ONDANSETRON HYDROCHLORIDE 8 MG/1
TABLET, FILM COATED ORAL
Qty: 20 TABLET | Refills: 3 | Status: SHIPPED | OUTPATIENT
Start: 2019-01-01 | End: 2019-01-01

## 2019-01-01 RX ORDER — SODIUM CHLORIDE 9 MG/ML
250 INJECTION, SOLUTION INTRAVENOUS ONCE
Status: DISCONTINUED | OUTPATIENT
Start: 2019-01-01 | End: 2019-01-01

## 2019-01-01 RX ORDER — DIPHENOXYLATE HYDROCHLORIDE AND ATROPINE SULFATE 2.5; .025 MG/1; MG/1
1 TABLET ORAL 4 TIMES DAILY PRN
Qty: 20 TABLET | Refills: 0 | Status: SHIPPED | OUTPATIENT
Start: 2019-01-01 | End: 2019-01-01

## 2019-01-01 RX ORDER — SODIUM CHLORIDE 9 MG/ML
250 INJECTION, SOLUTION INTRAVENOUS ONCE
Status: DISCONTINUED | OUTPATIENT
Start: 2019-01-01 | End: 2019-01-01 | Stop reason: HOSPADM

## 2019-01-01 RX ORDER — CALCIUM GLUCONATE 20 MG/ML
2 INJECTION, SOLUTION INTRAVENOUS ONCE
Status: COMPLETED | OUTPATIENT
Start: 2019-01-01 | End: 2019-01-01

## 2019-01-01 RX ORDER — DEXTROSE AND SODIUM CHLORIDE 5; .9 G/100ML; G/100ML
333 INJECTION, SOLUTION INTRAVENOUS ONCE
Status: CANCELLED | OUTPATIENT
Start: 2019-01-01 | End: 2019-01-01

## 2019-01-01 RX ORDER — ONDANSETRON 4 MG/1
4 TABLET, FILM COATED ORAL EVERY 6 HOURS PRN
Status: DISCONTINUED | OUTPATIENT
Start: 2019-01-01 | End: 2019-01-01 | Stop reason: HOSPADM

## 2019-01-01 RX ORDER — GLIPIZIDE 10 MG/1
TABLET, FILM COATED, EXTENDED RELEASE ORAL EVERY 24 HOURS
COMMUNITY
Start: 2019-01-01 | End: 2019-01-01

## 2019-01-01 RX ORDER — PANTOPRAZOLE SODIUM 20 MG/1
20 TABLET, DELAYED RELEASE ORAL DAILY
COMMUNITY
End: 2019-01-01

## 2019-01-01 RX ORDER — SODIUM CHLORIDE, SODIUM LACTATE, POTASSIUM CHLORIDE, CALCIUM CHLORIDE 600; 310; 30; 20 MG/100ML; MG/100ML; MG/100ML; MG/100ML
75 INJECTION, SOLUTION INTRAVENOUS CONTINUOUS
Status: DISCONTINUED | OUTPATIENT
Start: 2019-01-01 | End: 2019-01-01 | Stop reason: HOSPADM

## 2019-01-01 RX ORDER — HYDRALAZINE HYDROCHLORIDE 100 MG/1
100 TABLET, FILM COATED ORAL 3 TIMES DAILY
COMMUNITY
Start: 2019-01-01

## 2019-01-01 RX ORDER — LANCETS
EACH MISCELLANEOUS
COMMUNITY
Start: 2019-01-01 | End: 2019-01-01

## 2019-01-01 RX ORDER — ACETAMINOPHEN 325 MG/1
325 TABLET ORAL EVERY 4 HOURS PRN
Status: DISCONTINUED | OUTPATIENT
Start: 2019-01-01 | End: 2019-01-01 | Stop reason: HOSPADM

## 2019-01-01 RX ORDER — BLOOD-GLUCOSE METER
EACH MISCELLANEOUS
COMMUNITY
Start: 2019-01-01 | End: 2019-01-01

## 2019-01-01 RX ORDER — DIPHENHYDRAMINE HYDROCHLORIDE 50 MG/ML
50 INJECTION INTRAMUSCULAR; INTRAVENOUS ONCE
Status: DISCONTINUED | OUTPATIENT
Start: 2019-01-01 | End: 2019-01-01 | Stop reason: SDUPTHER

## 2019-01-01 RX ORDER — DIPHENOXYLATE HYDROCHLORIDE AND ATROPINE SULFATE 2.5; .025 MG/1; MG/1
1 TABLET ORAL 4 TIMES DAILY PRN
Qty: 30 TABLET | Refills: 0 | Status: SHIPPED | OUTPATIENT
Start: 2019-01-01 | End: 2020-01-01

## 2019-01-01 RX ORDER — SODIUM CHLORIDE 9 MG/ML
75 INJECTION, SOLUTION INTRAVENOUS CONTINUOUS
Status: DISCONTINUED | OUTPATIENT
Start: 2019-01-01 | End: 2019-01-01

## 2019-01-01 RX ORDER — METOCLOPRAMIDE 10 MG/1
10 TABLET ORAL
COMMUNITY
End: 2020-01-01

## 2019-01-01 RX ORDER — MAGNESIUM SULFATE 1 G/100ML
1 INJECTION INTRAVENOUS ONCE
Status: DISCONTINUED | OUTPATIENT
Start: 2019-01-01 | End: 2019-01-01

## 2019-01-01 RX ORDER — INSULIN GLARGINE 100 [IU]/ML
7 INJECTION, SOLUTION SUBCUTANEOUS
Status: DISCONTINUED | OUTPATIENT
Start: 2019-01-01 | End: 2019-01-01 | Stop reason: HOSPADM

## 2019-01-01 RX ORDER — MAGNESIUM SULFATE HEPTAHYDRATE 40 MG/ML
2 INJECTION, SOLUTION INTRAVENOUS ONCE
Status: COMPLETED | OUTPATIENT
Start: 2019-01-01 | End: 2019-01-01

## 2019-01-01 RX ORDER — MAGNESIUM OXIDE 400 MG/1
400 TABLET ORAL DAILY
COMMUNITY
Start: 2018-10-22 | End: 2019-01-01

## 2019-01-01 RX ORDER — CALCIUM CARBONATE 200(500)MG
2 TABLET,CHEWABLE ORAL 2 TIMES DAILY
Status: DISCONTINUED | OUTPATIENT
Start: 2019-01-01 | End: 2019-01-01 | Stop reason: HOSPADM

## 2019-01-01 RX ORDER — FENTANYL 100 UG/H
PATCH TRANSDERMAL
COMMUNITY
Start: 2019-01-01 | End: 2019-01-01 | Stop reason: SDUPTHER

## 2019-01-01 RX ORDER — BISACODYL 10 MG
10 SUPPOSITORY, RECTAL RECTAL DAILY PRN
Status: DISCONTINUED | OUTPATIENT
Start: 2019-01-01 | End: 2019-01-01 | Stop reason: HOSPADM

## 2019-01-01 RX ORDER — ACETAMINOPHEN 650 MG/1
325 SUPPOSITORY RECTAL EVERY 4 HOURS PRN
Status: DISCONTINUED | OUTPATIENT
Start: 2019-01-01 | End: 2019-01-01 | Stop reason: HOSPADM

## 2019-01-01 RX ORDER — DIPHENOXYLATE HYDROCHLORIDE AND ATROPINE SULFATE 2.5; .025 MG/1; MG/1
1 TABLET ORAL 4 TIMES DAILY PRN
Status: DISCONTINUED | OUTPATIENT
Start: 2019-01-01 | End: 2019-01-01 | Stop reason: HOSPADM

## 2019-01-01 RX ORDER — CALCIUM CARBONATE 200(500)MG
2 TABLET,CHEWABLE ORAL 2 TIMES DAILY
Qty: 60 TABLET | Refills: 0 | Status: SHIPPED | OUTPATIENT
Start: 2019-01-01 | End: 2019-01-01

## 2019-01-01 RX ORDER — HYDROCODONE BITARTRATE AND ACETAMINOPHEN 5; 325 MG/1; MG/1
TABLET ORAL
COMMUNITY
Start: 2019-01-01 | End: 2019-01-01

## 2019-01-01 RX ORDER — CALCIUM CARBONATE 200(500)MG
2 TABLET,CHEWABLE ORAL 3 TIMES DAILY
Qty: 180 TABLET | Refills: 2 | Status: SHIPPED | OUTPATIENT
Start: 2019-01-01 | End: 2020-01-01 | Stop reason: SDUPTHER

## 2019-01-01 RX ORDER — HEPARIN SODIUM 5000 [USP'U]/ML
5000 INJECTION, SOLUTION INTRAVENOUS; SUBCUTANEOUS EVERY 8 HOURS SCHEDULED
Status: DISCONTINUED | OUTPATIENT
Start: 2019-01-01 | End: 2019-01-01 | Stop reason: SDUPTHER

## 2019-01-01 RX ORDER — PEN NEEDLE, DIABETIC 30 GX5/16"
NEEDLE, DISPOSABLE MISCELLANEOUS
COMMUNITY
Start: 2019-01-01 | End: 2019-01-01

## 2019-01-01 RX ORDER — FENOFIBRATE 145 MG/1
145 TABLET, COATED ORAL DAILY
Status: DISCONTINUED | OUTPATIENT
Start: 2019-01-01 | End: 2019-01-01 | Stop reason: HOSPADM

## 2019-01-01 RX ORDER — CARVEDILOL 3.12 MG/1
TABLET ORAL EVERY 12 HOURS
COMMUNITY
Start: 2019-01-01 | End: 2019-01-01 | Stop reason: SDUPTHER

## 2019-01-01 RX ORDER — CARVEDILOL 3.12 MG/1
3.12 TABLET ORAL 2 TIMES DAILY WITH MEALS
Status: DISCONTINUED | OUTPATIENT
Start: 2019-01-01 | End: 2019-01-01 | Stop reason: HOSPADM

## 2019-01-01 RX ORDER — PANTOPRAZOLE SODIUM 20 MG/1
20 TABLET, DELAYED RELEASE ORAL DAILY
Qty: 30 TABLET | Refills: 3 | Status: SHIPPED | OUTPATIENT
Start: 2019-01-01

## 2019-01-01 RX ORDER — METOCLOPRAMIDE 10 MG/1
10 TABLET ORAL
Status: DISCONTINUED | OUTPATIENT
Start: 2019-01-01 | End: 2019-01-01 | Stop reason: HOSPADM

## 2019-01-01 RX ORDER — DIPHENOXYLATE HYDROCHLORIDE AND ATROPINE SULFATE 2.5; .025 MG/1; MG/1
1 TABLET ORAL 4 TIMES DAILY PRN
Qty: 20 TABLET | Refills: 0 | Status: SHIPPED | OUTPATIENT
Start: 2019-01-01 | End: 2019-01-01 | Stop reason: SDUPTHER

## 2019-01-01 RX ORDER — PREDNISONE 10 MG/1
30 TABLET ORAL 2 TIMES DAILY
Qty: 180 TABLET | Refills: 0 | Status: SHIPPED | OUTPATIENT
Start: 2019-01-01 | End: 2020-01-01 | Stop reason: SDUPTHER

## 2019-01-01 RX ORDER — POTASSIUM CHLORIDE 750 MG/1
10 TABLET, FILM COATED, EXTENDED RELEASE ORAL DAILY
Status: DISCONTINUED | OUTPATIENT
Start: 2019-01-01 | End: 2019-01-01 | Stop reason: HOSPADM

## 2019-01-01 RX ORDER — POTASSIUM CHLORIDE 750 MG/1
TABLET, FILM COATED, EXTENDED RELEASE ORAL
COMMUNITY
Start: 2019-01-01 | End: 2020-01-01 | Stop reason: SDUPTHER

## 2019-01-01 RX ORDER — ONDANSETRON HYDROCHLORIDE 8 MG/1
TABLET, FILM COATED ORAL
Qty: 20 TABLET | Refills: 2 | Status: SHIPPED | OUTPATIENT
Start: 2019-01-01 | End: 2019-01-01 | Stop reason: SDUPTHER

## 2019-01-01 RX ORDER — NICOTINE 21 MG/24HR
1 PATCH, TRANSDERMAL 24 HOURS TRANSDERMAL EVERY 24 HOURS
Status: DISCONTINUED | OUTPATIENT
Start: 2019-01-01 | End: 2019-01-01

## 2019-01-01 RX ORDER — ZINC GLUCONATE 50 MG
50 TABLET ORAL DAILY
Status: DISCONTINUED | OUTPATIENT
Start: 2019-01-01 | End: 2019-01-01 | Stop reason: HOSPADM

## 2019-01-01 RX ORDER — CARVEDILOL 3.12 MG/1
3.12 TABLET ORAL 2 TIMES DAILY WITH MEALS
COMMUNITY
End: 2020-01-01

## 2019-01-01 RX ORDER — LANCING DEVICE/LANCETS
KIT MISCELLANEOUS
COMMUNITY
Start: 2019-01-01 | End: 2019-01-01

## 2019-01-01 RX ORDER — LISINOPRIL 5 MG/1
5 TABLET ORAL 2 TIMES DAILY
Status: DISCONTINUED | OUTPATIENT
Start: 2019-01-01 | End: 2019-01-01 | Stop reason: HOSPADM

## 2019-01-01 RX ORDER — METOCLOPRAMIDE 5 MG/1
5 TABLET ORAL 4 TIMES DAILY PRN
Qty: 120 TABLET | Refills: 3 | Status: SHIPPED | OUTPATIENT
Start: 2019-01-01 | End: 2019-01-01 | Stop reason: SINTOL

## 2019-01-01 RX ORDER — CALCIUM GLUCONATE IN 0.9% NACL 3 G/100 ML
3 PLASTIC BAG, INJECTION (ML) INTRAVENOUS ONCE
Status: COMPLETED | OUTPATIENT
Start: 2019-01-01 | End: 2019-01-01

## 2019-01-01 RX ORDER — LISINOPRIL 5 MG/1
5 TABLET ORAL 2 TIMES DAILY
COMMUNITY

## 2019-01-01 RX ORDER — PROMETHAZINE HYDROCHLORIDE 25 MG/1
25 TABLET ORAL EVERY 6 HOURS PRN
COMMUNITY
End: 2019-01-01 | Stop reason: SDUPTHER

## 2019-01-01 RX ORDER — GLIPIZIDE 10 MG/1
10 TABLET ORAL DAILY
COMMUNITY
End: 2019-01-01

## 2019-01-01 RX ORDER — RIVAROXABAN 20 MG/1
TABLET, FILM COATED ORAL
Qty: 30 TABLET | Refills: 3 | Status: SHIPPED | OUTPATIENT
Start: 2019-01-01 | End: 2019-01-01

## 2019-01-01 RX ORDER — ONDANSETRON HYDROCHLORIDE 8 MG/1
TABLET, FILM COATED ORAL
Qty: 20 TABLET | Refills: 3 | Status: SHIPPED | OUTPATIENT
Start: 2019-01-01 | End: 2020-01-01

## 2019-01-01 RX ORDER — DIPHENHYDRAMINE HYDROCHLORIDE 50 MG/ML
INJECTION INTRAMUSCULAR; INTRAVENOUS
Status: DISCONTINUED
Start: 2019-01-01 | End: 2019-01-01 | Stop reason: HOSPADM

## 2019-01-01 RX ORDER — PREDNISONE 10 MG/1
10 TABLET ORAL ONCE
Status: COMPLETED | OUTPATIENT
Start: 2019-01-01 | End: 2019-01-01

## 2019-01-01 RX ADMIN — SODIUM CHLORIDE 75 MG: 900 INJECTION, SOLUTION INTRAVENOUS at 15:04

## 2019-01-01 RX ADMIN — CYANOCOBALAMIN TAB 1000 MCG 1000 MCG: 1000 TAB at 08:54

## 2019-01-01 RX ADMIN — HEPARIN 500 UNITS: 100 SYRINGE at 14:08

## 2019-01-01 RX ADMIN — CALCIUM GLUCONATE 2 G: 20 INJECTION, SOLUTION INTRAVENOUS at 18:37

## 2019-01-01 RX ADMIN — CALCIUM CARBONATE (ANTACID) CHEW TAB 500 MG 3 TABLET: 500 CHEW TAB at 08:53

## 2019-01-01 RX ADMIN — Medication 500 UNITS: at 16:34

## 2019-01-01 RX ADMIN — SODIUM CHLORIDE 220 MG: 900 INJECTION, SOLUTION INTRAVENOUS at 14:35

## 2019-01-01 RX ADMIN — ERYTHROPOIETIN 40000 UNITS: 40000 INJECTION, SOLUTION INTRAVENOUS; SUBCUTANEOUS at 14:02

## 2019-01-01 RX ADMIN — CARVEDILOL 3.12 MG: 3.12 TABLET, FILM COATED ORAL at 08:54

## 2019-01-01 RX ADMIN — Medication 10 ML: at 16:16

## 2019-01-01 RX ADMIN — Medication 500 UNITS: at 17:35

## 2019-01-01 RX ADMIN — TEMSIROLIMUS 25 MG: KIT at 15:15

## 2019-01-01 RX ADMIN — FERRIC CARBOXYMALTOSE INJECTION 750 MG: 50 INJECTION, SOLUTION INTRAVENOUS at 15:29

## 2019-01-01 RX ADMIN — SODIUM CHLORIDE 65 MG: 900 INJECTION, SOLUTION INTRAVENOUS at 15:20

## 2019-01-01 RX ADMIN — SODIUM CHLORIDE 1000 ML: 900 INJECTION, SOLUTION INTRAVENOUS at 13:47

## 2019-01-01 RX ADMIN — PREDNISONE 10 MG: 10 TABLET ORAL at 22:00

## 2019-01-01 RX ADMIN — SODIUM CHLORIDE 200 MG: 900 INJECTION, SOLUTION INTRAVENOUS at 14:38

## 2019-01-01 RX ADMIN — ERYTHROPOIETIN 40000 UNITS: 40000 INJECTION, SOLUTION INTRAVENOUS; SUBCUTANEOUS at 09:50

## 2019-01-01 RX ADMIN — LISINOPRIL 5 MG: 5 TABLET ORAL at 09:20

## 2019-01-01 RX ADMIN — Medication 50 MG: at 09:15

## 2019-01-01 RX ADMIN — HEPARIN 500 UNITS: 100 SYRINGE at 17:20

## 2019-01-01 RX ADMIN — SODIUM CHLORIDE 75 ML/HR: 900 INJECTION, SOLUTION INTRAVENOUS at 18:17

## 2019-01-01 RX ADMIN — DIPHENHYDRAMINE HYDROCHLORIDE 25 MG: 50 INJECTION, SOLUTION INTRAMUSCULAR; INTRAVENOUS at 15:56

## 2019-01-01 RX ADMIN — MAGNESIUM SULFATE HEPTAHYDRATE 2 G: 40 INJECTION, SOLUTION INTRAVENOUS at 11:12

## 2019-01-01 RX ADMIN — HEPARIN 500 UNITS: 100 SYRINGE at 16:13

## 2019-01-01 RX ADMIN — SODIUM CHLORIDE 200 MG: 900 INJECTION, SOLUTION INTRAVENOUS at 15:08

## 2019-01-01 RX ADMIN — Medication 10 ML: at 16:13

## 2019-01-01 RX ADMIN — SODIUM CHLORIDE 220 MG: 900 INJECTION, SOLUTION INTRAVENOUS at 14:18

## 2019-01-01 RX ADMIN — DIPHENHYDRAMINE HYDROCHLORIDE 50 MG: 50 INJECTION INTRAMUSCULAR; INTRAVENOUS at 16:26

## 2019-01-01 RX ADMIN — RIVAROXABAN 20 MG: 20 TABLET, FILM COATED ORAL at 08:53

## 2019-01-01 RX ADMIN — SODIUM CHLORIDE 250 ML: 0.9 INJECTION, SOLUTION INTRAVENOUS at 15:20

## 2019-01-01 RX ADMIN — ERYTHROPOIETIN 40000 UNITS: 40000 INJECTION, SOLUTION INTRAVENOUS; SUBCUTANEOUS at 16:09

## 2019-01-01 RX ADMIN — Medication 3 G: at 22:30

## 2019-01-01 RX ADMIN — FERRIC CARBOXYMALTOSE INJECTION 750 MG: 50 INJECTION, SOLUTION INTRAVENOUS at 11:44

## 2019-01-01 RX ADMIN — Medication 3 G: at 04:44

## 2019-01-01 RX ADMIN — SODIUM CHLORIDE 333 ML/HR: 450 INJECTION, SOLUTION INTRAVENOUS at 08:10

## 2019-01-01 RX ADMIN — INSULIN GLARGINE 7 UNITS: 100 INJECTION, SOLUTION SUBCUTANEOUS at 08:57

## 2019-01-01 RX ADMIN — SODIUM CHLORIDE 250 ML: 900 INJECTION, SOLUTION INTRAVENOUS at 15:24

## 2019-01-01 RX ADMIN — Medication 10 ML: at 16:58

## 2019-01-01 RX ADMIN — SODIUM CHLORIDE, SODIUM LACTATE, POTASSIUM CHLORIDE, AND CALCIUM CHLORIDE 75 ML/HR: .6; .31; .03; .02 INJECTION, SOLUTION INTRAVENOUS at 23:27

## 2019-01-01 RX ADMIN — DIPHENHYDRAMINE HYDROCHLORIDE 25 MG: 50 INJECTION, SOLUTION INTRAMUSCULAR; INTRAVENOUS at 14:55

## 2019-01-01 RX ADMIN — HYDRALAZINE HYDROCHLORIDE 100 MG: 25 TABLET, FILM COATED ORAL at 08:53

## 2019-01-01 RX ADMIN — DIPHENHYDRAMINE HYDROCHLORIDE 50 MG: 50 INJECTION INTRAMUSCULAR; INTRAVENOUS at 16:51

## 2019-01-01 RX ADMIN — HEPARIN 500 UNITS: 100 SYRINGE at 16:59

## 2019-01-01 RX ADMIN — SODIUM CHLORIDE 1000 ML: 900 INJECTION, SOLUTION INTRAVENOUS at 14:53

## 2019-01-01 RX ADMIN — POTASSIUM CHLORIDE 10 MEQ: 10 TABLET, EXTENDED RELEASE ORAL at 08:53

## 2019-01-01 RX ADMIN — DENOSUMAB 120 MG: 120 INJECTION SUBCUTANEOUS at 14:03

## 2019-01-01 RX ADMIN — TEMSIROLIMUS 25 MG: KIT at 15:59

## 2019-01-01 RX ADMIN — METOCLOPRAMIDE 10 MG: 10 TABLET ORAL at 08:54

## 2019-01-01 RX ADMIN — SODIUM CHLORIDE 333 ML/HR: 450 INJECTION, SOLUTION INTRAVENOUS at 10:00

## 2019-01-01 RX ADMIN — Medication 10 ML: at 17:34

## 2019-01-01 RX ADMIN — Medication 500 UNITS: at 16:16

## 2019-01-01 RX ADMIN — Medication 10 ML: at 14:08

## 2019-01-01 RX ADMIN — DENOSUMAB 120 MG: 120 INJECTION SUBCUTANEOUS at 16:33

## 2019-01-01 RX ADMIN — TEMSIROLIMUS 25 MG: KIT at 16:54

## 2019-01-01 RX ADMIN — Medication 10 ML: at 12:06

## 2019-01-01 RX ADMIN — DENOSUMAB 120 MG: 120 INJECTION SUBCUTANEOUS at 17:05

## 2019-01-01 RX ADMIN — DIPHENHYDRAMINE HYDROCHLORIDE 25 MG: 50 INJECTION, SOLUTION INTRAMUSCULAR; INTRAVENOUS at 15:04

## 2019-01-01 RX ADMIN — PANTOPRAZOLE SODIUM 40 MG: 40 TABLET, DELAYED RELEASE ORAL at 08:54

## 2019-01-01 RX ADMIN — Medication 10 ML: at 16:36

## 2019-01-01 RX ADMIN — Medication 10 ML: at 09:00

## 2019-01-01 RX ADMIN — Medication 10 ML: at 00:00

## 2019-01-01 RX ADMIN — HEPARIN SODIUM (PORCINE) LOCK FLUSH IV SOLN 100 UNIT/ML 500 UNITS: 100 SOLUTION at 15:46

## 2019-01-01 RX ADMIN — SODIUM CHLORIDE 75 MG: 900 INJECTION, SOLUTION INTRAVENOUS at 15:28

## 2019-01-01 RX ADMIN — TEMSIROLIMUS 25 MG: KIT at 15:26

## 2019-01-01 RX ADMIN — Medication 10 ML: at 17:16

## 2019-01-01 RX ADMIN — FERRIC CARBOXYMALTOSE INJECTION 750 MG: 50 INJECTION, SOLUTION INTRAVENOUS at 15:25

## 2019-01-01 RX ADMIN — DIPHENHYDRAMINE HYDROCHLORIDE 25 MG: 50 INJECTION, SOLUTION INTRAMUSCULAR; INTRAVENOUS at 15:31

## 2019-01-01 RX ADMIN — HEPARIN 500 UNITS: 100 SYRINGE at 16:36

## 2019-01-01 RX ADMIN — SODIUM CHLORIDE 66 MG: 900 INJECTION, SOLUTION INTRAVENOUS at 15:51

## 2019-01-01 RX ADMIN — IOPAMIDOL 100 ML: 755 INJECTION, SOLUTION INTRAVENOUS at 13:20

## 2019-01-01 RX ADMIN — Medication 10 ML: at 15:46

## 2019-01-01 RX ADMIN — SODIUM CHLORIDE 500 ML: 450 INJECTION, SOLUTION INTRAVENOUS at 16:16

## 2019-01-01 RX ADMIN — Medication 10 ML: at 16:34

## 2019-01-01 RX ADMIN — TEMSIROLIMUS 25 MG: KIT at 16:23

## 2019-01-01 RX ADMIN — FENOFIBRATE 145 MG: 145 TABLET ORAL at 08:53

## 2019-01-01 RX ADMIN — HEPARIN 500 UNITS: 100 SYRINGE at 12:08

## 2019-01-04 ENCOUNTER — CLINICAL SUPPORT (OUTPATIENT)
Dept: ONCOLOGY | Facility: HOSPITAL | Age: 74
End: 2019-01-04

## 2019-01-04 ENCOUNTER — HOSPITAL ENCOUNTER (OUTPATIENT)
Dept: ONCOLOGY | Facility: CLINIC | Age: 74
Setting detail: INFUSION SERIES
Discharge: HOME | End: 2019-01-04
Attending: INTERNAL MEDICINE | Admitting: INTERNAL MEDICINE

## 2019-01-04 NOTE — PROGRESS NOTES
PATIENTS ONCOLOGY RECORD LOCATED IN [employer]      Subjective     Name:  CHANELLE GONZALES     Date:  2019  Address:  [address] [place] [address]  Home: [unfilled]  :  1945 AGE:  73 y.o.        RECORDS OBTAINED:  Patients Oncology Record is located in [facility]

## 2019-01-11 ENCOUNTER — HOSPITAL ENCOUNTER (OUTPATIENT)
Dept: OTHER | Facility: HOSPITAL | Age: 74
Discharge: HOME | End: 2019-01-11
Attending: INTERNAL MEDICINE | Admitting: INTERNAL MEDICINE

## 2019-01-21 ENCOUNTER — HOSPITAL ENCOUNTER (OUTPATIENT)
Dept: ONCOLOGY | Facility: CLINIC | Age: 74
Setting detail: INFUSION SERIES
Discharge: HOME | End: 2019-01-21
Attending: INTERNAL MEDICINE | Admitting: INTERNAL MEDICINE

## 2019-01-21 ENCOUNTER — CLINICAL SUPPORT (OUTPATIENT)
Dept: ONCOLOGY | Facility: HOSPITAL | Age: 74
End: 2019-01-21

## 2019-01-21 NOTE — PROGRESS NOTES
PATIENTS ONCOLOGY RECORD LOCATED IN [facility]      Subjective     Name:  CHANELLE GONZALES     Date:  2019  Address:  [address] [place] [address]  Home: [unfilled]  :  1945 AGE:  73 y.o.        RECORDS OBTAINED:  Patients Oncology Record is located in [facility]

## 2019-01-28 ENCOUNTER — HOSPITAL ENCOUNTER (OUTPATIENT)
Dept: ONCOLOGY | Facility: CLINIC | Age: 74
Setting detail: INFUSION SERIES
Discharge: HOME | End: 2019-01-28
Attending: INTERNAL MEDICINE | Admitting: INTERNAL MEDICINE

## 2019-01-28 ENCOUNTER — CLINICAL SUPPORT (OUTPATIENT)
Dept: ONCOLOGY | Facility: HOSPITAL | Age: 74
End: 2019-01-28

## 2019-01-28 NOTE — PROGRESS NOTES
PATIENTS ONCOLOGY RECORD LOCATED IN [facility]      Subjective     Name:  CHANELLE GONZALES     Date:  2019  Address:  [address] [place] [address]  Home: [unfilled]  :  1945 AGE:  73 y.o.        RECORDS OBTAINED:  Patients Oncology Record is located in [facility]

## 2019-02-04 ENCOUNTER — HOSPITAL ENCOUNTER (OUTPATIENT)
Dept: LAB | Facility: HOSPITAL | Age: 74
Discharge: HOME | End: 2019-02-04
Attending: INTERNAL MEDICINE | Admitting: INTERNAL MEDICINE

## 2019-02-04 ENCOUNTER — HOSPITAL ENCOUNTER (OUTPATIENT)
Dept: ONCOLOGY | Facility: HOSPITAL | Age: 74
Discharge: HOME | End: 2019-02-04
Attending: INTERNAL MEDICINE | Admitting: INTERNAL MEDICINE

## 2019-02-04 LAB
ALBUMIN SERPL-MCNC: 3.7 G/DL (ref 3.5–4.8)
ALBUMIN/GLOB SERPL: 1.3 {RATIO} (ref 1–1.7)
ALP SERPL-CCNC: 86 IU/L (ref 32–91)
ALT SERPL-CCNC: 15 IU/L (ref 14–54)
ANION GAP SERPL CALC-SCNC: 13.7 MMOL/L (ref 10–20)
AST SERPL-CCNC: 17 IU/L (ref 15–41)
BASOPHILS # BLD AUTO: 0 10*3/UL (ref 0–0.2)
BASOPHILS NFR BLD AUTO: 0 % (ref 0–2)
BILIRUB SERPL-MCNC: 0.9 MG/DL (ref 0.3–1.2)
BILIRUB UR QL STRIP: NEGATIVE MG/DL
BUN SERPL-MCNC: 20 MG/DL (ref 8–20)
BUN/CREAT SERPL: 13.3 (ref 5.4–26.2)
CALCIUM SERPL-MCNC: 10 MG/DL (ref 8.9–10.3)
CASTS URNS QL MICRO: ABNORMAL /[LPF]
CHLORIDE SERPL-SCNC: 105 MMOL/L (ref 101–111)
CK SERPL-CCNC: 112 IU/L (ref 38–234)
COLOR UR: YELLOW
CONV BACTERIA IN URINE MICRO: NEGATIVE
CONV CLARITY OF URINE: ABNORMAL
CONV CO2: 21 MMOL/L (ref 22–32)
CONV HYALINE CASTS IN URINE MICRO: ABNORMAL /[LPF] (ref 0–5)
CONV PROTEIN IN URINE BY AUTOMATED TEST STRIP: 100 MG/DL
CONV SMALL ROUND CELLS: ABNORMAL /[HPF]
CONV TOTAL PROTEIN: 6.5 G/DL (ref 6.1–7.9)
CONV UROBILINOGEN IN URINE BY AUTOMATED TEST STRIP: 0.2 MG/DL
CREAT 24H UR-MCNC: 247.6 MG/DL
CREAT UR-MCNC: 1.5 MG/DL (ref 0.4–1)
CULTURE INDICATED?: ABNORMAL
DIFFERENTIAL METHOD BLD: (no result)
EOSINOPHIL # BLD AUTO: 0.2 10*3/UL (ref 0–0.3)
EOSINOPHIL # BLD AUTO: 2 % (ref 0–3)
ERYTHROCYTE [DISTWIDTH] IN BLOOD BY AUTOMATED COUNT: 16.6 % (ref 11.5–14.5)
GLOBULIN UR ELPH-MCNC: 2.8 G/DL (ref 2.5–3.8)
GLUCOSE BLD-MCNC: 149 MG/DL (ref 70–105)
GLUCOSE SERPL-MCNC: 166 MG/DL (ref 65–99)
GLUCOSE UR QL: NEGATIVE MG/DL
HCT VFR BLD AUTO: 34 % (ref 35–49)
HGB BLD-MCNC: 11.2 G/DL (ref 12–15)
HGB UR QL STRIP: NEGATIVE
KETONES UR QL STRIP: NEGATIVE MG/DL
LEUKOCYTE ESTERASE UR QL STRIP: ABNORMAL
LYMPHOCYTES # BLD AUTO: 0.8 10*3/UL (ref 0.8–4.8)
LYMPHOCYTES NFR BLD AUTO: 9 % (ref 18–42)
MAGNESIUM SERPL-MCNC: 1.8 MG/DL (ref 1.8–2.5)
MCH RBC QN AUTO: 27.5 PG (ref 26–32)
MCHC RBC AUTO-ENTMCNC: 32.9 G/DL (ref 32–36)
MCV RBC AUTO: 83.7 FL (ref 80–94)
MONOCYTES # BLD AUTO: 0.6 10*3/UL (ref 0.1–1.3)
MONOCYTES NFR BLD AUTO: 7 % (ref 2–11)
NEUTROPHILS # BLD AUTO: 7.3 10*3/UL (ref 2.3–8.6)
NEUTROPHILS NFR BLD AUTO: 82 % (ref 50–75)
NITRITE UR QL STRIP: NEGATIVE
NRBC BLD AUTO-RTO: 0 /100{WBCS}
NRBC/RBC NFR BLD MANUAL: 0 10*3/UL
PH UR STRIP.AUTO: 5.5 [PH] (ref 4.5–8)
PHOSPHATE SERPL-MCNC: 1.7 MG/DL (ref 2.4–4.7)
PLATELET # BLD AUTO: 221 10*3/UL (ref 150–450)
PMV BLD AUTO: 8.1 FL (ref 7.4–10.4)
POTASSIUM SERPL-SCNC: 3.7 MMOL/L (ref 3.6–5.1)
PROT UR-MCNC: 69 MG/DL
PROT/CREAT UR: 0.3 MG/MG (ref 0–22)
RBC # BLD AUTO: 4.07 10*6/UL (ref 4–5.4)
RBC #/AREA URNS HPF: 4 /[HPF] (ref 0–3)
SODIUM SERPL-SCNC: 136 MMOL/L (ref 136–144)
SP GR UR: 1.03 (ref 1–1.03)
SPERM URNS QL MICRO: ABNORMAL /[HPF]
SQUAMOUS SPT QL MICRO: 6 /[HPF] (ref 0–5)
UNIDENT CRYS URNS QL MICRO: ABNORMAL /[HPF]
URATE SERPL-MCNC: 6.6 MG/DL (ref 2.6–8)
WBC # BLD AUTO: 8.9 10*3/UL (ref 4.5–11.5)
WBC #/AREA URNS HPF: 12 /[HPF] (ref 0–5)
YEAST SPEC QL WET PREP: ABNORMAL /[HPF]

## 2019-02-06 LAB
ANA SER QL IA: NORMAL
PROT PATTERN SERPL IFE-IMP: NORMAL

## 2019-02-11 ENCOUNTER — HOSPITAL ENCOUNTER (OUTPATIENT)
Dept: ONCOLOGY | Facility: CLINIC | Age: 74
Setting detail: INFUSION SERIES
Discharge: HOME | End: 2019-02-11
Attending: INTERNAL MEDICINE | Admitting: INTERNAL MEDICINE

## 2019-02-11 ENCOUNTER — CLINICAL SUPPORT (OUTPATIENT)
Dept: ONCOLOGY | Facility: HOSPITAL | Age: 74
End: 2019-02-11

## 2019-02-11 ENCOUNTER — HOSPITAL ENCOUNTER (OUTPATIENT)
Dept: ONCOLOGY | Facility: HOSPITAL | Age: 74
Discharge: HOME | End: 2019-02-11
Attending: INTERNAL MEDICINE | Admitting: INTERNAL MEDICINE

## 2019-02-11 LAB
ALBUMIN SERPL-MCNC: 3.9 G/DL (ref 3.5–4.8)
ALBUMIN/GLOB SERPL: 1.8 {RATIO} (ref 1–1.7)
ALP SERPL-CCNC: 100 IU/L (ref 32–91)
ALT SERPL-CCNC: 7 IU/L (ref 14–54)
ANION GAP SERPL CALC-SCNC: 14.8 MMOL/L (ref 10–20)
AST SERPL-CCNC: 12 IU/L (ref 15–41)
BILIRUB SERPL-MCNC: 2.2 MG/DL (ref 0.3–1.2)
BUN SERPL-MCNC: 25 MG/DL (ref 8–20)
BUN/CREAT SERPL: 16.7 (ref 5.4–26.2)
CALCIUM SERPL-MCNC: 9.9 MG/DL (ref 8.9–10.3)
CHLORIDE SERPL-SCNC: 108 MMOL/L (ref 101–111)
CONV CO2: 19 MMOL/L (ref 22–32)
CONV TOTAL PROTEIN: 6.1 G/DL (ref 6.1–7.9)
CREAT UR-MCNC: 1.5 MG/DL (ref 0.4–1)
GLOBULIN UR ELPH-MCNC: 2.2 G/DL (ref 2.5–3.8)
GLUCOSE SERPL-MCNC: 158 MG/DL (ref 65–99)
POTASSIUM SERPL-SCNC: 3.8 MMOL/L (ref 3.6–5.1)
SODIUM SERPL-SCNC: 138 MMOL/L (ref 136–144)

## 2019-02-11 NOTE — PROGRESS NOTES
PATIENTS ONCOLOGY RECORD LOCATED IN [employer]      Subjective     Name:  CHANELLE GONZALES     Date:  2019  Address:  [address] [place] [address]  Home: [unfilled]  :  1945 AGE:  73 y.o.        RECORDS OBTAINED:  Patients Oncology Record is located in [facility]

## 2019-02-18 ENCOUNTER — HOSPITAL ENCOUNTER (OUTPATIENT)
Dept: ONCOLOGY | Facility: CLINIC | Age: 74
Setting detail: INFUSION SERIES
Discharge: HOME | End: 2019-02-18
Attending: INTERNAL MEDICINE | Admitting: INTERNAL MEDICINE

## 2019-02-18 ENCOUNTER — CLINICAL SUPPORT (OUTPATIENT)
Dept: ONCOLOGY | Facility: HOSPITAL | Age: 74
End: 2019-02-18

## 2019-02-18 NOTE — PROGRESS NOTES
PATIENTS ONCOLOGY RECORD LOCATED IN [employer]      Subjective     Name:  CHANELLE GONZALES     Date:  2019  Address:  [address] [place] [address]  Home: [unfilled]  :  1945 AGE:  73 y.o.        RECORDS OBTAINED:  Patients Oncology Record is located in [facility]

## 2019-02-27 ENCOUNTER — HOSPITAL ENCOUNTER (OUTPATIENT)
Dept: ONCOLOGY | Facility: HOSPITAL | Age: 74
Discharge: HOME | End: 2019-02-27

## 2019-02-27 ENCOUNTER — HOSPITAL ENCOUNTER (OUTPATIENT)
Dept: ONCOLOGY | Facility: CLINIC | Age: 74
Setting detail: INFUSION SERIES
Discharge: HOME | End: 2019-02-27
Attending: INTERNAL MEDICINE | Admitting: INTERNAL MEDICINE

## 2019-02-27 ENCOUNTER — HOSPITAL ENCOUNTER (OUTPATIENT)
Dept: ONCOLOGY | Facility: HOSPITAL | Age: 74
Discharge: HOME | End: 2019-02-27
Attending: INTERNAL MEDICINE | Admitting: INTERNAL MEDICINE

## 2019-02-27 LAB
ALBUMIN SERPL-MCNC: 3.7 G/DL (ref 3.5–4.8)
ALBUMIN/GLOB SERPL: 1.6 {RATIO} (ref 1–1.7)
ALP SERPL-CCNC: 115 IU/L (ref 32–91)
ALT SERPL-CCNC: 13 IU/L (ref 14–54)
ANION GAP SERPL CALC-SCNC: 17.2 MMOL/L (ref 10–20)
AST SERPL-CCNC: 20 IU/L (ref 15–41)
BILIRUB SERPL-MCNC: 0.7 MG/DL (ref 0.3–1.2)
BUN SERPL-MCNC: 26 MG/DL (ref 8–20)
BUN/CREAT SERPL: 20 (ref 5.4–26.2)
CALCIUM SERPL-MCNC: 8.1 MG/DL (ref 8.9–10.3)
CHLORIDE SERPL-SCNC: 109 MMOL/L (ref 101–111)
CONV CO2: 19 MMOL/L (ref 22–32)
CONV TOTAL PROTEIN: 6 G/DL (ref 6.1–7.9)
CREAT UR-MCNC: 1.3 MG/DL (ref 0.4–1)
GLOBULIN UR ELPH-MCNC: 2.3 G/DL (ref 2.5–3.8)
GLUCOSE SERPL-MCNC: 139 MG/DL (ref 65–99)
POTASSIUM SERPL-SCNC: 4.2 MMOL/L (ref 3.6–5.1)
SODIUM SERPL-SCNC: 141 MMOL/L (ref 136–144)

## 2019-03-04 ENCOUNTER — HOSPITAL ENCOUNTER (OUTPATIENT)
Dept: ONCOLOGY | Facility: CLINIC | Age: 74
Setting detail: INFUSION SERIES
Discharge: HOME | End: 2019-03-04
Attending: INTERNAL MEDICINE | Admitting: INTERNAL MEDICINE

## 2019-03-04 ENCOUNTER — HOSPITAL ENCOUNTER (OUTPATIENT)
Dept: CT IMAGING | Facility: HOSPITAL | Age: 74
Discharge: HOME | End: 2019-03-04
Attending: INTERNAL MEDICINE | Admitting: INTERNAL MEDICINE

## 2019-03-04 ENCOUNTER — HOSPITAL ENCOUNTER (OUTPATIENT)
Dept: ONCOLOGY | Facility: HOSPITAL | Age: 74
Discharge: HOME | End: 2019-03-04

## 2019-03-04 LAB — CREAT BLDA-MCNC: 1.1 MG/DL (ref 0.6–1.3)

## 2019-03-11 ENCOUNTER — HOSPITAL ENCOUNTER (OUTPATIENT)
Dept: ONCOLOGY | Facility: CLINIC | Age: 74
Setting detail: INFUSION SERIES
Discharge: HOME | End: 2019-03-11
Attending: INTERNAL MEDICINE | Admitting: INTERNAL MEDICINE

## 2019-03-11 ENCOUNTER — CLINICAL SUPPORT (OUTPATIENT)
Dept: ONCOLOGY | Facility: HOSPITAL | Age: 74
End: 2019-03-11

## 2019-03-11 ENCOUNTER — HOSPITAL ENCOUNTER (OUTPATIENT)
Dept: ONCOLOGY | Facility: HOSPITAL | Age: 74
Discharge: HOME | End: 2019-03-11
Attending: INTERNAL MEDICINE | Admitting: INTERNAL MEDICINE

## 2019-03-11 LAB
ALBUMIN SERPL-MCNC: 3.2 G/DL (ref 3.5–4.8)
ALBUMIN/GLOB SERPL: 1.1 {RATIO} (ref 1–1.7)
ALP SERPL-CCNC: 85 IU/L (ref 32–91)
ALT SERPL-CCNC: 14 IU/L (ref 14–54)
ANION GAP SERPL CALC-SCNC: 15.1 MMOL/L (ref 10–20)
AST SERPL-CCNC: 21 IU/L (ref 15–41)
BILIRUB SERPL-MCNC: 0.5 MG/DL (ref 0.3–1.2)
BUN SERPL-MCNC: 19 MG/DL (ref 8–20)
BUN/CREAT SERPL: 12.7 (ref 5.4–26.2)
CALCIUM SERPL-MCNC: 9 MG/DL (ref 8.9–10.3)
CHLORIDE SERPL-SCNC: 102 MMOL/L (ref 101–111)
CHOLEST SERPL-MCNC: 107 MG/DL
CHOLEST/HDLC SERPL: 3.1 {RATIO}
CONV CO2: 23 MMOL/L (ref 22–32)
CONV LDL CHOLESTEROL DIRECT: 57 MG/DL (ref 0–100)
CONV TOTAL PROTEIN: 6 G/DL (ref 6.1–7.9)
CREAT UR-MCNC: 1.5 MG/DL (ref 0.4–1)
GLOBULIN UR ELPH-MCNC: 2.8 G/DL (ref 2.5–3.8)
GLUCOSE SERPL-MCNC: 177 MG/DL (ref 65–99)
HDLC SERPL-MCNC: 35 MG/DL
LDLC/HDLC SERPL: 1.7 {RATIO}
LIPID INTERPRETATION: ABNORMAL
POTASSIUM SERPL-SCNC: 4.1 MMOL/L (ref 3.6–5.1)
SODIUM SERPL-SCNC: 136 MMOL/L (ref 136–144)
TRIGL SERPL-MCNC: 103 MG/DL
VLDLC SERPL CALC-MCNC: 14.9 MG/DL

## 2019-03-11 NOTE — PROGRESS NOTES
PATIENTS ONCOLOGY RECORD LOCATED IN [facility]      Subjective     Name:  CHANELLE GONZALES     Date:  2019  Address:  [address] [place] [address]  Home: [unfilled]  :  1945 AGE:  73 y.o.        RECORDS OBTAINED:  Patients Oncology Record is located in [facility]

## 2019-03-18 NOTE — PROGRESS NOTES
PATIENTS ONCOLOGY RECORD LOCATED IN [facility]      Subjective     Name:  CHANELLE GONZALES     Date:  2019  Address:  [address] [place] [address]  Home: [unfilled]  :  1945 AGE:  73 y.o.        RECORDS OBTAINED:  Patients Oncology Record is located in [facility]

## 2019-03-25 NOTE — PROGRESS NOTES
PATIENTS ONCOLOGY RECORD LOCATED IN [facility]      Subjective     Name:  CHANELLE GONZALES     Date:  2019  Address:  [address] [place] [address]  Home: [unfilled]  :  1945 AGE:  73 y.o.        RECORDS OBTAINED:  Patients Oncology Record is located in [facility]

## 2019-04-01 NOTE — PROGRESS NOTES
PATIENTS ONCOLOGY RECORD LOCATED IN [employer]      Subjective     Name:  CHANELLE GONZALES     Date:  2019  Address:  [address] [place] [address]  Home: [unfilled]  :  1945 AGE:  73 y.o.        RECORDS OBTAINED:  Patients Oncology Record is located in [facility]

## 2019-04-08 NOTE — PROGRESS NOTES
PATIENTS ONCOLOGY RECORD LOCATED IN [employer]      Subjective     Name:  CHANELLE GONZALES     Date:  2019  Address:  [address] [place] [address]  Home: [unfilled]  :  1945 AGE:  73 y.o.        RECORDS OBTAINED:  Patients Oncology Record is located in [facility]

## 2019-04-10 NOTE — PROGRESS NOTES
PATIENTS ONCOLOGY RECORD LOCATED IN [employer]      Subjective     Name:  CHANELLE GONZALES     Date:  04/10/2019  Address:  [address] [place] [address]  Home: [unfilled]  :  1945 AGE:  73 y.o.        RECORDS OBTAINED:  Patients Oncology Record is located in [facility]

## 2019-04-15 NOTE — PROGRESS NOTES
PATIENTS ONCOLOGY RECORD LOCATED IN [facility]      Subjective     Name:  CHANELLE GONZALES     Date:  04/15/2019  Address:  [address] [place] [address]  Home: [unfilled]  :  1945 AGE:  73 y.o.        RECORDS OBTAINED:  Patients Oncology Record is located in [facility]

## 2019-04-22 NOTE — PROGRESS NOTES
PATIENTS ONCOLOGY RECORD LOCATED IN [facility]      Subjective     Name:  CHANELLE GONZALES     Date:  2019  Address:  [address] [place] [address]  Home: [unfilled]  :  1945 AGE:  73 y.o.        RECORDS OBTAINED:  Patients Oncology Record is located in [facility]

## 2019-04-29 NOTE — PROGRESS NOTES
PATIENTS ONCOLOGY RECORD LOCATED IN [facility]      Subjective     Name:  CHANELLE GONZALES     Date:  2019  Address:  [address] [place] [address]  Home: [unfilled]  :  1945 AGE:  73 y.o.        RECORDS OBTAINED:  Patients Oncology Record is located in [facility]

## 2019-05-06 NOTE — PROGRESS NOTES
PATIENTS ONCOLOGY RECORD LOCATED IN [facility]      Subjective     Name:  CHANELLE GONZALES     Date:  2019  Address:  [address] [place] [address]  Home: [unfilled]  :  1945 AGE:  73 y.o.        RECORDS OBTAINED:  Patients Oncology Record is located in [facility]

## 2019-05-07 NOTE — PROGRESS NOTES
[facility]      Subjective     Name:  CHANELLE GONZALES     Date:  2019  Address:  [address] [place] [address]  Home: [unfilled]  :  1945 AGE:  73 y.o.        RECORDS OBTAINED:  Patients Oncology Record is located in [facility]

## 2019-05-13 NOTE — PROGRESS NOTES
PATIENTS ONCOLOGY RECORD LOCATED IN [facility]      Subjective     Name:  CHANELLE GONZALES     Date:  2019  Address:  [address] [place] [address]  Home: [unfilled]  :  1945 AGE:  73 y.o.        RECORDS OBTAINED:  Patients Oncology Record is located in [facility]

## 2019-05-20 NOTE — PROGRESS NOTES
PATIENTS ONCOLOGY RECORD LOCATED IN [facility]      Subjective     Name:  CHANELLE GONZALES     Date:  2019  Address:  [address] [place] [address]  Home: [unfilled]  :  1945 AGE:  73 y.o.        RECORDS OBTAINED:  Patients Oncology Record is located in [facility]

## 2019-05-21 PROBLEM — C64.9 RENAL CELL CARCINOMA (HCC): Status: ACTIVE | Noted: 2019-01-01

## 2019-05-21 PROBLEM — C79.51 BONE METASTASIS: Status: ACTIVE | Noted: 2019-01-01

## 2019-05-31 NOTE — PROGRESS NOTES
PATIENTS ONCOLOGY RECORD LOCATED IN [facility]      Subjective     Name:  CHANELLE GONZALES     Date:  2019  Address:  [address] [place] [address]  Home: [unfilled]  :  1945 AGE:  73 y.o.        RECORDS OBTAINED:  Patients Oncology Record is located in [facility]

## 2019-06-04 ENCOUNTER — OFFICE (AMBULATORY)
Dept: URBAN - METROPOLITAN AREA CLINIC 64 | Facility: CLINIC | Age: 74
End: 2019-06-04

## 2019-06-04 VITALS
SYSTOLIC BLOOD PRESSURE: 120 MMHG | WEIGHT: 173 LBS | HEIGHT: 67 IN | HEART RATE: 61 BPM | DIASTOLIC BLOOD PRESSURE: 49 MMHG

## 2019-06-04 DIAGNOSIS — R11.2 NAUSEA WITH VOMITING, UNSPECIFIED: ICD-10-CM

## 2019-06-04 DIAGNOSIS — R13.10 DYSPHAGIA, UNSPECIFIED: ICD-10-CM

## 2019-06-04 DIAGNOSIS — R43.2 PARAGEUSIA: ICD-10-CM

## 2019-06-04 DIAGNOSIS — D64.9 ANEMIA, UNSPECIFIED: ICD-10-CM

## 2019-06-04 PROCEDURE — 99204 OFFICE O/P NEW MOD 45 MIN: CPT | Performed by: INTERNAL MEDICINE

## 2019-06-04 RX ORDER — ZINC GLUCONATE 50 MG
TABLET ORAL
Qty: 30 | Refills: 1 | Status: ACTIVE

## 2019-06-04 RX ORDER — PANTOPRAZOLE SODIUM 20 MG/1
20 TABLET, DELAYED RELEASE ORAL
Qty: 30 | Refills: 3 | Status: ACTIVE
Start: 2019-06-04

## 2019-06-06 NOTE — PROGRESS NOTES
"Visit Type:  Follow-up Visit  Referring Provider:  Mercedez Dallas MD  Primary Provider:  Burt DELGADO MD      History of Present Illness:  here for insulin teach.  worsening dm.      Past Medical History:     Reviewed history from 05/22/2019 and no changes required:        Hypertension        Dyspnea        AV Block; SSS        Chest Pressure                metastatic renal cell carcinoma    Past Surgical History:     Reviewed history from 07/10/2017 and no changes required:        Total Abdominal Hysterectomy - 2000        Pacemaker: Dual Chamber 9/18/2014 - BS        heart cath3/14/17    Family History Summary:      Reviewed history Last on 05/22/2019 and no changes required:05/29/2019  Mother - Has Family History of Hypertension - Entered On: 12/15/2015    General Comments - FH:  FH Hypertension - Mother      Social History:     Reviewed history from 05/22/2019 and no changes required:        Passive Smoke: N        Alcohol Use: N        Drug Use: N        HIV/High Risk: N        Regular Exercise: N        Alcohol Use - no        Drug Use - no        HIV/High Risk - no                Smoking History:        Patient has never smoked.      Active Medications (reviewed today):  PEN NEEDLES 3/16\" 31G X 5 MM (INSULIN PEN NEEDLE) use 1 times per day  LANTUS SOLOSTAR 100 UNIT/ML SUBCUTANEOUS SOLUTION PEN-INJECTOR (INSULIN GLARGINE) 10 units daily  GLIPIZIDE XL 10 MG ORAL TABLET EXTENDED RELEASE 24 HOUR (GLIPIZIDE) 1 tab in am  COREG 3.125 MG ORAL TABLET (CARVEDILOL) 1 tab bid  HYDRALAZINE HCL 50 MG ORAL TABLET (HYDRALAZINE HCL) 1 tab bid  HYDROCODONE-ACETAMINOPHEN 5-325 MG ORAL TABLET (HYDROCODONE-ACETAMINOPHEN) one q6 prn for breakthrough pain  DURAGESIC-100 PATCH 72 HOUR (FENTANYL PT72) one patch q3 days  XARELTO 20 MG ORAL TABLET (RIVAROXABAN) once daily  ADVAIR DISKUS 250-50 MCG/DOSE INHALATION AEROSOL POWDER BREATH ACTIVATED (FLUTICASONE-SALMETEROL) inhale 1 puffs bid  MAGNESIUM OXIDE 400 MG ORAL TABLET " "(MAGNESIUM OXIDE) 1 tab bid  FENOFIBRATE 160 MG TABLET (FENOFIBRATE) TAKE ONE TABLET BY MOUTH DAILY  ASPIRIN LOW DOSE 81 MG ORAL TABLET (ASPIRIN) Take one by mouth daily    Current Allergies (reviewed today):  No known allergies    Current Medications (including medications started today):   ACCU-CHEK FASTCLIX LANCETS (LANCETS) test blood sugar daily- OR ANY BRAND COVERED  ACCU-CHEK JESSIE PLUS IN VITRO STRIP (GLUCOSE BLOOD) use 1 daily- OR ANY BRAND INS COVERS  ACCU-CHEK JESSIE CONNECT W/DEVICE KIT (BLOOD GLUCOSE MONITORING SUPPL) use 1 daily- OR ANY BRAND INS COVERS  PEN NEEDLES 3/16\" 31G X 5 MM (INSULIN PEN NEEDLE) use 1 times per day  LANTUS SOLOSTAR 100 UNIT/ML SUBCUTANEOUS SOLUTION PEN-INJECTOR (INSULIN GLARGINE) 10 units daily  GLIPIZIDE XL 10 MG ORAL TABLET EXTENDED RELEASE 24 HOUR (GLIPIZIDE) 1 tab in am  COREG 3.125 MG ORAL TABLET (CARVEDILOL) 1 tab bid  HYDRALAZINE HCL 50 MG ORAL TABLET (HYDRALAZINE HCL) 1 tab bid  HYDROCODONE-ACETAMINOPHEN 5-325 MG ORAL TABLET (HYDROCODONE-ACETAMINOPHEN) one q6 prn for breakthrough pain  DURAGESIC-100 PATCH 72 HOUR (FENTANYL PT72) one patch q3 days  XARELTO 20 MG ORAL TABLET (RIVAROXABAN) once daily  ADVAIR DISKUS 250-50 MCG/DOSE INHALATION AEROSOL POWDER BREATH ACTIVATED (FLUTICASONE-SALMETEROL) inhale 1 puffs bid  MAGNESIUM OXIDE 400 MG ORAL TABLET (MAGNESIUM OXIDE) 1 tab bid  FENOFIBRATE 160 MG TABLET (FENOFIBRATE) TAKE ONE TABLET BY MOUTH DAILY  ASPIRIN LOW DOSE 81 MG ORAL TABLET (ASPIRIN) Take one by mouth daily      Risk Factors:     Smoked Tobacco Use:  Never smoker  Drug use:  no  HIV high-risk behavior:  no  Alcohol use:  no        Vital Signs:    Patient Profile:    73 Years Old Female  Height:     58 inches  Weight:     166 pounds  BMI:        34.69     O2 Sat:     92 %  Pulse rate: 68 / minute  BP Sittin / 55  (right arm)    Cuff size:  regular    Medications: Medications were reviewed with the patient during this visit.  Allergies: Allergies were " reviewed with the patient during this visit.  No Known Allergy.        Vitals Entered By: Noni ELLIS (May 29, 2019 11:34 AM)      Physical Exam    General:      well developed, well nourished, in no acute distress.    Head:      normocephalic and atraumatic.    Psych:      alert and cooperative; normal mood and affect; normal attention span and concentration.        Blood Pressure:  Today's BP: 103/55 mm Hg    Labwork:   Most Recent Lab Results:   LDL: 90 mg/dL 05/22/2019  HbA1c: : 11.3 % 05/22/2019        Impression & Recommendations:    Problem # 1:  DIABETES MELLITUS (ICD-250.00) (GJQ50-C26.9)  20 of 25min spent discussing pt medical concerns and treatment  insulin teaching  gave patient 10 units in office    Her updated medication list for this problem includes:     Lantus Solostar 100 Unit/ml Subcutaneous Solution Pen-injector (Insulin glargine) ..... 10 units daily     Glipizide Xl 10 Mg Oral Tablet Extended Release 24 Hour (Glipizide) ..... 1 tab in am     Aspirin Low Dose 81 Mg Oral Tablet (Aspirin) ..... Take one by mouth daily    Orders:  Comprehensive Diabetes Management Group Class (10 hours) (NA)      Medications Added to Medication List This Visit:  1)  Accu-chek Fastclix Lancets (Lancets) .... Test blood sugar daily- or any brand covered  2)  Accu-chek Fatuma Plus in Vitro Strip (Glucose blood) .... Use 1 daily- or any brand ins covers  3)  Accu-chek Fatuma Connect W/device Kit (Blood glucose monitoring suppl) .... Use 1 daily- or any brand ins covers      Patient Instructions:  1)  check morning/fasting blood sugars every morning, goal less than 120  2)  call in 1-2 weeks -Noni [phone] option 3, then 4 again                        Medication Administration    Orders Added:  1)  Comprehensive Diabetes Management Group Class (10 hours) [NA]  2)  [id number] Vst-Est Level IV [CPT-20830]  ]      Electronically signed by Mercedez Dallas MD on 05/29/2019 at 11:47  AM  ________________________________________________________________________       Disclaimer: Converted Note message may not contain all data elements that existed in the legacy source system. Please see [facility] Legacy System for the original note details.

## 2019-06-06 NOTE — PROGRESS NOTES
Visit Type:  Follow-up Visit  Referring Provider:  Mercedez Dallas MD  Primary Provider:  Burt DELGADO MD      History of Present Illness:  Follow-up on diabetes hypertension hyperlipidemia    here because dr. jim wanted her to f/u on htn  states that her bs is too high  seeing dr handy  break on chemo due to kidney fxn     reports having trouble swallowing, GI referral pending     reports for years having shortness of breath, worsening with dyspnea on mild exertion   PFTs 6 months ago normal      Past Medical History:     Reviewed history from 10/15/2018 and no changes required:        Hypertension        Dyspnea        AV Block; SSS        Chest Pressure                metastatic renal cell carcinoma    Past Surgical History:     Reviewed history from 07/10/2017 and no changes required:        Total Abdominal Hysterectomy - 2000        Pacemaker: Dual Chamber 9/18/2014 - BS        heart cath3/14/17    Family History Summary:      Reviewed history Last on 11/16/2018 and no changes required:05/24/2019  Mother - Has Family History of Hypertension - Entered On: 12/15/2015    General Comments - FH:  FH Hypertension - Mother      Social History:     Reviewed history from 10/15/2018 and no changes required:        Passive Smoke: N        Alcohol Use: N        Drug Use: N        HIV/High Risk: N        Regular Exercise: N        Alcohol Use - no        Drug Use - no        HIV/High Risk - no                Smoking History:        Patient has never smoked.      Active Medications (reviewed today):  TRAMADOL HCL 50 MG ORAL TABLET (TRAMADOL HCL) 1 tab q4h prn pain  LISINOPRIL 40 MG ORAL TABLET (LISINOPRIL) 1 tab daily  ELIQUIS 5 MG ORAL TABLET (APIXABAN) 1 tab bid  ADVAIR DISKUS 250-50 MCG/DOSE INHALATION AEROSOL POWDER BREATH ACTIVATED (FLUTICASONE-SALMETEROL) inhale 1 puffs bid  MAGNESIUM OXIDE 400 MG ORAL TABLET (MAGNESIUM OXIDE) 1 tab bid  FENOFIBRATE 160 MG ORAL TABLET (FENOFIBRATE) 1 tab daily  LABETALOL   MG TABLET (LABETALOL HCL) TAKE ONE TABLET BY MOUTH TWICE A DAY  AMLODIPINE BESYLATE 5 MG TAB (AMLODIPINE BESYLATE) TAKE ONE TABLET BY MOUTH DAILY  ASPIRIN LOW DOSE 81 MG ORAL TABLET (ASPIRIN) Take one by mouth daily    Current Allergies (reviewed today):  No known allergies    Current Medications (including medications started today):   COREG 3.125 MG ORAL TABLET (CARVEDILOL) 1 tab bid  HYDRALAZINE HCL 50 MG ORAL TABLET (HYDRALAZINE HCL) 1 tab bid  HYDROCODONE-ACETAMINOPHEN 5-325 MG ORAL TABLET (HYDROCODONE-ACETAMINOPHEN) one q6 prn for breakthrough pain  DURAGESIC-100 PATCH 72 HOUR (FENTANYL PT72) one patch q3 days  GLIPIZIDE ER 2.5 MG ORAL TABLET EXTENDED RELEASE 24 HOUR (GLIPIZIDE) one daily  XARELTO 20 MG ORAL TABLET (RIVAROXABAN) once daily  ADVAIR DISKUS 250-50 MCG/DOSE INHALATION AEROSOL POWDER BREATH ACTIVATED (FLUTICASONE-SALMETEROL) inhale 1 puffs bid  MAGNESIUM OXIDE 400 MG ORAL TABLET (MAGNESIUM OXIDE) 1 tab bid  FENOFIBRATE 160 MG ORAL TABLET (FENOFIBRATE) 1 tab daily  ASPIRIN LOW DOSE 81 MG ORAL TABLET (ASPIRIN) Take one by mouth daily      Risk Factors:     Smoked Tobacco Use:  Never smoker  Drug use:  no  HIV high-risk behavior:  no  Alcohol use:  no        Vital Signs:    Patient Profile:    73 Years Old Female  Height:     58 inches  Weight:     170 pounds  BMI:        35.53     O2 Sat:     95 %  Temp:       98.1 degrees F  Pulse rate: 62 / minute  BP Sittin / 45  (left arm)    Cuff size:  regular    Medications: Medications were reviewed with the patient during this visit.  Allergies: Allergies were reviewed with the patient during this visit.  No Known Allergy.        Vitals Entered By: Noni ELLIS (May 22, 2019 9:57 AM)      Physical Exam    General:      well developed, well nourished, in no acute distress.    Head:      normocephalic and atraumatic.    Lungs:      clear bilaterally to auscultation.    Heart:      regular rhythm.    Psych:      alert and cooperative; normal  mood and affect; normal attention span and concentration.        Blood Pressure:  Today's BP: 97/45 mm Hg    Labwork:   Most Recent Lab Results:   LDL: 79 mg/dL 10/15/2018  HbA1c: : 6.7 % 10/15/2018        Impression & Recommendations:    Problem # 1:  DYSPHAGIA UNSPECIFIED (ICD-787.20) (IZF25-P59.10)   has upcoming GI appointment, check swallowing study  Orders:  XR ESOPHAGRAM W/ BARIUM (STANDARD) (CPT-34758)      Problem # 2:  Renal insufficiency (ICD-593.9) (HQO98-O52.9)   following with kidney doctor, check labs  Orders:  [facility] COMPREHENSIVE METABOLIC PANEL (CMP) (MPC)  [facility] LIPID PANEL (LIPID)  [facility] HEMOGLOBIN A1c (A1DCA)  [facility] CBC W/DIFF; PATH REVIEW IF INDICATED (CBC)  [facility] VITAMIN D TOTAL (VITD)      Problem # 3:  DIABETES MELLITUS (ICD-250.00) (WJN58-N85.9)   due for labs  Orders:  [facility] COMPREHENSIVE METABOLIC PANEL (CMP) (MPC)  [facility] LIPID PANEL (LIPID)  [facility] HEMOGLOBIN A1c (A1DCA)  [facility] CBC W/DIFF; PATH REVIEW IF INDICATED (CBC)  [facility] VITAMIN D TOTAL (VITD)      Problem # 4:  DYSPNEA (ICD-786.05) (JKI69-R03.02)   reports worsening dyspnea on exertion over the past 2 years, negative PFTs, check echo  Orders:  2D ECHO W/COLOR FLOW DOPPLER (CPT-31455)      Problem # 5:  Hypertension (ICD-401.9) (DNP50-Y33)   medication changes by renal   patient here today with mild hypotension, heart rate 62 patient reports worsening fatigue   stopping labetalol, changed to low-dose Coreg    Problem # 6:  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 1 tab bid  2)  Hydralazine Hcl 50 Mg Oral  Tablet (Hydralazine hcl) .... 1 tab bid  3)  Hydrocodone-acetaminophen 5-325 Mg Oral Tablet (Hydrocodone-acetaminophen) .... One q6 prn for breakthrough pain  4)  Duragesic-100 Patch 72 Hour (Fentanyl pt72) .... One patch q3 days  5)  Glipizide Er 2.5 Mg Oral Tablet Extended Release 24 Hour (Glipizide) .... One daily  6)  Xarelto 20 Mg Oral Tablet (Rivaroxaban) .... Once daily      Patient Instructions:  1)  echo  2)  labs  3)  stop labetolol  4)  start cardiolol 3.125mg twice a day  5)  follow up 3 months                        Medication Administration    Orders Added:  1)  [facility] COMPREHENSIVE METABOLIC PANEL (CMP) [MPC]  2)  [facility] LIPID PANEL [LIPID]  3)  [facility] HEMOGLOBIN A1c [A1DCA]  4)  [facility] CBC W/DIFF; PATH REVIEW IF INDICATED [CBC]  5)  [facility] VITAMIN D TOTAL [VITD]  6)  2D ECHO W/COLOR FLOW DOPPLER [CPT-49836]  7)  XR ESOPHAGRAM W/ BARIUM (STANDARD) [CPT-45296]  8)  31661-Tzg Vst-Est Level IV [CPT-97777]  ]      Electronically signed by Mercedez Dallas MD on 05/24/2019 at 7:09 AM  ________________________________________________________________________       Disclaimer: Converted Note message may not contain all data elements that existed in the legacy source system. Please see [employer] [facility] Legacy System for the original note details.

## 2019-06-17 PROBLEM — D50.8 IRON DEFICIENCY ANEMIA SECONDARY TO INADEQUATE DIETARY IRON INTAKE: Status: ACTIVE | Noted: 2019-01-01

## 2019-06-17 NOTE — PROGRESS NOTES
Case Management/Social Work    Patient Name:  Caroline Truong  YOB: 1945  MRN: [id number]  Admit Date:  (Not on file)        OSW met with patient and her friend Sarah Marino. Patient is alert and oriented to person, place and time. She is pleasant. She states she is now getting Meals to Go which comes every Thursday. She is pleased with most selections but often does not eat the vegetables. She said a friend helped to move her washer and dryer from downstairs to upstairs and she is pleased. She no longer goes downstairs and if needed she asks friends to do so. She denies any falls. She states she went to the movies recently, which was the first fun thing she has done is a while. She has no noted concerns. SW will remain available.       Electronically signed by:  Luz Maria Ulrich LCSW  06/17/19 3:53 PM

## 2019-06-18 ENCOUNTER — OFFICE (AMBULATORY)
Dept: URBAN - METROPOLITAN AREA PATHOLOGY 4 | Facility: PATHOLOGY | Age: 74
End: 2019-06-18

## 2019-06-18 ENCOUNTER — ON CAMPUS - OUTPATIENT (AMBULATORY)
Dept: URBAN - METROPOLITAN AREA HOSPITAL 2 | Facility: HOSPITAL | Age: 74
End: 2019-06-18

## 2019-06-18 VITALS
OXYGEN SATURATION: 100 % | SYSTOLIC BLOOD PRESSURE: 146 MMHG | OXYGEN SATURATION: 95 % | WEIGHT: 173 LBS | SYSTOLIC BLOOD PRESSURE: 130 MMHG | DIASTOLIC BLOOD PRESSURE: 84 MMHG | OXYGEN SATURATION: 90 % | HEART RATE: 60 BPM | OXYGEN SATURATION: 92 % | HEIGHT: 67 IN | SYSTOLIC BLOOD PRESSURE: 121 MMHG | OXYGEN SATURATION: 98 % | TEMPERATURE: 97.8 F | DIASTOLIC BLOOD PRESSURE: 72 MMHG | SYSTOLIC BLOOD PRESSURE: 135 MMHG | DIASTOLIC BLOOD PRESSURE: 60 MMHG | DIASTOLIC BLOOD PRESSURE: 56 MMHG | RESPIRATION RATE: 18 BRPM | DIASTOLIC BLOOD PRESSURE: 58 MMHG | SYSTOLIC BLOOD PRESSURE: 131 MMHG | RESPIRATION RATE: 16 BRPM | SYSTOLIC BLOOD PRESSURE: 139 MMHG | HEART RATE: 63 BPM | SYSTOLIC BLOOD PRESSURE: 123 MMHG | OXYGEN SATURATION: 93 %

## 2019-06-18 DIAGNOSIS — C79.00 SECONDARY MALIGNANT NEOPLASM OF UNSPECIFIED KIDNEY AND RENAL: ICD-10-CM

## 2019-06-18 DIAGNOSIS — R13.10 DYSPHAGIA, UNSPECIFIED: ICD-10-CM

## 2019-06-18 DIAGNOSIS — D64.9 ANEMIA, UNSPECIFIED: ICD-10-CM

## 2019-06-18 DIAGNOSIS — K25.3 ACUTE GASTRIC ULCER WITHOUT HEMORRHAGE OR PERFORATION: ICD-10-CM

## 2019-06-18 DIAGNOSIS — R11.2 NAUSEA WITH VOMITING, UNSPECIFIED: ICD-10-CM

## 2019-06-18 LAB
GI HISTOLOGY: A. SELECT: (no result)
GI HISTOLOGY: B. SELECT: (no result)
GI HISTOLOGY: PDF REPORT: (no result)

## 2019-06-18 PROCEDURE — 43450 DILATE ESOPHAGUS 1/MULT PASS: CPT | Performed by: INTERNAL MEDICINE

## 2019-06-18 PROCEDURE — 88305 TISSUE EXAM BY PATHOLOGIST: CPT | Mod: 26 | Performed by: INTERNAL MEDICINE

## 2019-06-18 PROCEDURE — 88342 IMHCHEM/IMCYTCHM 1ST ANTB: CPT | Mod: 26 | Performed by: INTERNAL MEDICINE

## 2019-06-18 PROCEDURE — 88341 IMHCHEM/IMCYTCHM EA ADD ANTB: CPT | Mod: 26 | Performed by: INTERNAL MEDICINE

## 2019-06-18 PROCEDURE — 43239 EGD BIOPSY SINGLE/MULTIPLE: CPT | Performed by: INTERNAL MEDICINE

## 2019-06-21 NOTE — TELEPHONE ENCOUNTER
Please confirm how much insulin she is on    We started at 10    We need to decrease dose    So if still on 10, decrease to 7  
Pt called to report her bs readings.  6/16-83, 6/17-82, 6/, 6/, 6/20-85, 6/21-65  
Spoke with pt. She was taking 10u. Informed to decrease to 7u daily and call in a week with readings on that dose. She agrees  
<<----- Click to add NO significant Past Surgical History

## 2019-06-25 NOTE — PROGRESS NOTES
Hematology/Oncology Outpatient Follow Up    Caroline Truong  1945    Primary Care Physician: Mercedez Dallas MD  Referring Physician: Mercedez Dallas*    No chief complaint on file.  1. Metastatic renal cell carcinoma.   2. Bone mets.   3. Left upper extremity unprovoked DVT.         History of Present Illness:     Ms. Truong does not have a personal or family history of blood   clotting disorder.  She reports about four weeks prior to presentation, she noticed swelling in   her left arm.  Slowly it was getting bigger and pain started, and she sought medical attention.    · 11/16/18 - Doppler ultrasound of the left upper extremity was obtained which revealed acute DVT   involving the left subclavian vein, axillary vein and brachial vein.  Superficial venous   thrombosis involving the left basilic vein.    · 11/30/18 - VQ scan was obtained, which was low probability of PE.    · Patient was sent to the [facility] and seen initially on 12/5/18.  · 12/11/18 - CT scan of the chest, abdomen and pelvis done without contrast secondary to renal   insufficiency showed multiple small pulmonary nodules.  Largest measuring 5 mm within the right   lower lobe, indeterminate, but may represent small pulmonary metastasis.  Follow up recommended.    Enlarged AP window lymph node 1.4 x 2.8 cm.  Pathological fractures involving the left 1st rib   and right 7th rib with underlying lytic lesions suspicious for osseous metastatic disease or   myeloma.       Exophytic lobular intermediate density lesion arising from the lower pole of the right kidney,   suspicious for a primary neoplasm.  Evaluation limited due to lack of IV contrast.  Lytic lesions   involving the posteromedial right acetabular wall and left iliac vein suspicious for osseous   metastatic disease or myeloma.     · 12/21/18 - [place]-lambda free light chain ratio 0.98.  Serum electrophoresis normal.  Serum   immunofixation  normal.  Urine immunofixation normal.  B12 495.  Beta 2 microglobulin 1.36.    Immunoglobulin G and M normal.  Haptoglobin 157 normal.  Ferritin 138.  Folic acid 13.1.    Creatinine 1.5.  Retic count 1.26.  Hemoccult negative x3.    · Patient was continued on indefinite Xarelto for her DVT.   2.   Oncologic History:    · 1/11/19 - Bone scan showed uptake within the posterior left 1st rib, posterior right 7th rib, L1   vertebral body and left iliac crest consistent with known lytic lesions seen on CT of 12/11/18.    Consistent with metastatic disease.    · 1/24/19 - CT-guided biopsy of the left iliac crest positive for metastatic clear cell renal cell   carcinoma.    · 2/4/19 - PET/CT scan showed hypermetabolic 2.3 x 4.6 cm right renal mass most consistent with   renal cell carcinoma.  An adjacent 12 mm right for renal soft tissue nodule consistent with   metastatic nodule.  Right retroperitoneal hypermetabolic lymphadenopathy.  Multiple small   pulmonary nodules worrisome for hematogenous pulmonary metastases.  Multiple lytic metastases in   the chest, abdomen and pelvis which have developed or enlarged since December 2018.   · 2/18/19 - Patient started Votrient 800 mg to take once a day.   · 3/4/19 - CT scan of the chest with PE protocol.  No evidence of PE.  Worsening of metastatic   disease, but no definite new lesions.  No fractures.  Stable tiny bilateral pulmonary nodules.    · 3/11/19 - Patient’s chemotherapy was switched to weekly Torisel at 25 mg (Votrient was stopped   secondary to tumor pain).   · 5/28/19 - PET/CT scan showed overall appears to be improvement from prior exam, specifically   multiple pulmonary nodules have improved have prior study and mediastinal lymphadenopathy has   improved.  There appears to be interval central necrosis involving an osseous metastasis along   the left iliac vein.  4.1 cm right renal mass demonstrates suggestion of some central necrosis.    A previously described lymph  node anterior to the psoas muscle is no longer well visualized.  A   right retroperitoneal FDG avid lymph node and an FDG avid soft tissue nodule in the right   pararenal space appears stable.        Past Medical History:   Diagnosis Date   • Abnormal laboratory test 12/07/2018   • Anemia 12/11/2018   • Atrioventricular block, complete (CMS/[facility]) 08/29/2014   • Bilateral leg edema 09/09/2014   • Bradycardia 08/29/2014   • Chest discomfort 08/24/2014   • Clear cell carcinoma of kidney (CMS/[facility]) 01/28/2019   • Deep venous thrombosis of arm (CMS/[facility]) 11/19/2018   • Diabetes mellitus (CMS/[facility]) 10/22/2018   • Dyspnea 10/15/2018   • Dyspnea 08/29/2014   • Fatigue 10/15/2018   • Hypertension 08/29/2014   • Localized swelling, left upper limb 11/16/2018   • Osteopenia 10/30/2018   • Overweight 10/15/2018   • Pacemaker 09/30/2014    permanent   • Postmenopausal status 10/15/2018   • Preop examination 11/30/2018   • Renal insufficiency 12/11/2018       Past Surgical History:   Procedure Laterality Date   • CARDIAC CATHETERIZATION  03/14/2017   • PACEMAKER IMPLANTATION  09/18/2014    Dual chamber- BS   • PACEMAKER IMPLANTATION     • TOTAL ABDOMINAL HYSTERECTOMY  2000         Current Outpatient Medications:   •  ACCU-CHEK FASTCLIX LANCETS misc, ACCU-CHEK FASTCLIX LANCETS, Disp: , Rfl:   •  Blood Glucose Monitoring Suppl (ACCU-CHEK JESSIE PLUS) w/Device kit, ACCU-CHEK JESSIE PLUS w/Device KIT, Disp: , Rfl:   •  carvedilol (COREG) 3.125 MG tablet, Every 12 (Twelve) Hours., Disp: , Rfl:   •  fenofibrate 160 MG tablet, Take 160 mg by mouth Daily., Disp: , Rfl:   •  fentaNYL (DURAGESIC) 100 MCG/HR patch, DURAGESIC-100 PT72, Disp: , Rfl:   •  glipiZIDE (GLIPIZIDE XL) 10 MG 24 hr tablet, Daily., Disp: , Rfl:   •  glucose blood (ACCU-CHEK JESSIE PLUS) test strip, ACCU-CHEK JESSIE PLUS STRP, Disp: , Rfl:   •  hydrALAZINE (APRESOLINE) 50 MG tablet, Every 12 (Twelve) Hours., Disp: , Rfl:   •  Insulin Glargine (LANTUS SOLOSTAR) 100 UNIT/ML  "injection pen, Inject 7 Units under the skin into the appropriate area as directed Daily., Disp: 15 pen, Rfl: 0  •  Insulin Pen Needle (PEN NEEDLES 3/16\") 31G X 5 MM misc, PEN NEEDLES 3/16\" 31G X 5 MM, Disp: , Rfl:   •  Lancets Misc. (ACCU-CHEK SOFTCLIX LANCET DEV) kit, ACCU-CHEK SOFTCLIX LANCET DEV KIT, Disp: , Rfl:   •  magnesium oxide (MAG-OX) 400 MG tablet, Daily., Disp: , Rfl:   •  ondansetron (ZOFRAN) 8 MG tablet, Take  by mouth Every 8 (Eight) Hours As Needed for Nausea or Vomiting., Disp: , Rfl:   •  pantoprazole (PROTONIX) 20 MG EC tablet, Take 20 mg by mouth Daily., Disp: , Rfl:   •  promethazine (PHENERGAN) 25 MG tablet, Take 25 mg by mouth Every 6 (Six) Hours As Needed for Nausea or Vomiting., Disp: , Rfl:   •  rivaroxaban (XARELTO) 20 MG tablet, Daily., Disp: , Rfl:   •  Zinc Gluconate 50 MG capsule, Take  by mouth., Disp: , Rfl:   •  amLODIPine (NORVASC) 5 MG tablet, Take 5 mg by mouth Daily., Disp: , Rfl:   •  apixaban (ELIQUIS) 5 MG tablet tablet, Take 5 mg by mouth 2 (Two) Times a Day., Disp: , Rfl:   •  aspirin (ASPIRIN LOW DOSE) 81 MG tablet, Take 81 mg by mouth Daily., Disp: , Rfl:   •  fenofibrate 160 MG tablet, Daily., Disp: , Rfl:   •  fluticasone-salmeterol (ADVAIR DISKUS) 250-50 MCG/DOSE DISKUS, Inhale 2 (Two) Times a Day., Disp: , Rfl:   •  HYDROcodone-acetaminophen (NORCO) 5-325 MG per tablet, HYDROCODONE-ACETAMINOPHEN 5-325 MG TABS, Disp: , Rfl:   •  labetalol (NORMODYNE) 200 MG tablet, Take 200 mg by mouth 2 (Two) Times a Day., Disp: , Rfl:   •  lisinopril (PRINIVIL,ZESTRIL) 40 MG tablet, Take 40 mg by mouth Daily., Disp: , Rfl:   •  magnesium oxide (MAGOX) 400 (241.3 Mg) MG tablet tablet, Take 400 mg by mouth 2 (Two) Times a Day., Disp: , Rfl:   •  traMADol (ULTRAM) 50 MG tablet, Take 50 mg by mouth Every 4 (Four) Hours As Needed for Moderate Pain ., Disp: , Rfl:   No current facility-administered medications for this visit.     No Known Allergies    Family History   Problem Relation " "Age of Onset   • Hypertension Mother        Cancer-related family history is not on file.    Social History     Tobacco Use   • Smoking status: Never Smoker   • Smokeless tobacco: Never Used   Substance Use Topics   • Alcohol use: No     Frequency: Never   • Drug use: No       I have reviewed the history of present illness, past medical history, family history, social history, lab results, all notes and other records since the patient was last seen on 6/17/2019    Subjective   Pain and swelling in the left arm persistent.  Tolerating torisel relatively well.  Has fatigue and tiredness. reports blood sugars are controlled well.  Ulcer on her left left foot middle toe is improving          ROS:       Review of Systems   HENT: Negative for nosebleeds and trouble swallowing.    Eyes: Negative for visual disturbance.   Respiratory: Negative for cough, shortness of breath and wheezing.    Cardiovascular: Negative for chest pain.   Gastrointestinal: Negative for abdominal pain and blood in stool.   Genitourinary: Negative for dysuria and hematuria.   Musculoskeletal: Negative for joint swelling.   Skin: Negative for rash.   Neurological: Negative for seizures.   Hematological: Does not bruise/bleed easily.   Psychiatric/Behavioral: The patient is not nervous/anxious.    Is fatigued and tired has pain in the left arm.      Objective:       Vitals:    06/25/19 1222   BP: 140/55   Pulse: 69   Resp: 18   Temp: 98.3 °F (36.8 °C)   Weight: 78.9 kg (174 lb)   Height: 170.2 cm (67\")   PainSc: 0-No pain         Physical Exam   Constitutional: She is oriented to person, place, and time.   HENT:   Head: Normocephalic and atraumatic.   Neck: Trachea normal and normal range of motion.   Cardiovascular: S1 normal and S2 normal.   Pulmonary/Chest: Breath sounds normal.   Abdominal: Soft. Bowel sounds are normal. There is no hepatosplenomegaly.   Neurological: She is alert and oriented to person, place, and time. She has normal strength. "   Skin: Skin is warm and dry.   Psychiatric: She has a normal mood and affect. Her speech is normal.     Left upper extremity swelling  RECENT LABS:     WBC   Date Value Ref Range Status   06/25/2019 4.42 3.40 - 10.80 10*3/mm3 Final     RBC   Date Value Ref Range Status   06/25/2019 3.12 (L) 3.77 - 5.28 10*6/mm3 Final     Hemoglobin   Date Value Ref Range Status   06/25/2019 7.6 (C) 12.0 - 15.9 g/dL Final     Hematocrit   Date Value Ref Range Status   06/25/2019 24.9 (L) 34.0 - 46.6 % Final     MCV   Date Value Ref Range Status   06/25/2019 79.8 79.0 - 97.0 fL Final     MCH   Date Value Ref Range Status   06/25/2019 24.4 (L) 26.6 - 33.0 pg Final     MCHC   Date Value Ref Range Status   06/25/2019 30.5 (L) 31.5 - 35.7 g/dL Final     RDW   Date Value Ref Range Status   06/25/2019 18.5 (H) 12.3 - 15.4 % Final     RDW-SD   Date Value Ref Range Status   06/25/2019 50.9 37.0 - 54.0 fl Final     MPV   Date Value Ref Range Status   06/25/2019 9.1 6.0 - 12.0 fL Final     Platelets   Date Value Ref Range Status   06/25/2019 288 140 - 450 10*3/mm3 Final     Neutrophil %   Date Value Ref Range Status   06/25/2019 70.3 42.7 - 76.0 % Final     Lymphocyte %   Date Value Ref Range Status   06/25/2019 10.2 (L) 19.6 - 45.3 % Final     Monocyte %   Date Value Ref Range Status   06/25/2019 15.2 (H) 5.0 - 12.0 % Final     Eosinophil %   Date Value Ref Range Status   06/25/2019 3.6 0.3 - 6.2 % Final     Basophil %   Date Value Ref Range Status   06/25/2019 0.7 0.0 - 1.5 % Final     Neutrophils, Absolute   Date Value Ref Range Status   06/25/2019 3.11 1.70 - 7.00 10*3/mm3 Final     Lymphocytes, Absolute   Date Value Ref Range Status   06/25/2019 0.45 (L) 0.70 - 3.10 10*3/mm3 Final     Monocytes, Absolute   Date Value Ref Range Status   06/25/2019 0.67 0.10 - 0.90 10*3/mm3 Final     Eosinophils, Absolute   Date Value Ref Range Status   06/25/2019 0.16 0.00 - 0.40 10*3/mm3 Final     Basophils, Absolute   Date Value Ref Range Status    06/25/2019 0.03 0.00 - 0.20 10*3/mm3 Final     nRBC   Date Value Ref Range Status   05/22/2019 0 0 /100[WBCs] Final       Lab Results   Component Value Date    GLUCOSE 156 (H) 06/24/2019    BUN 17 06/24/2019    CREATININE 1.90 (H) 06/24/2019    EGFRIFNONA 26 (L) 06/24/2019    EGFRIFAFRI 34 (L) 05/31/2019    BCR 8.9 06/24/2019    K 3.4 (L) 06/24/2019    CO2 16.0 (L) 06/24/2019    CALCIUM 7.7 (L) 06/24/2019    ALBUMIN 3.10 (L) 06/24/2019    LABIL2 1.1 06/10/2019    AST 19 06/24/2019    ALT 12 (L) 06/24/2019         Assessment/Plan      ASSESSMENT:     1. Metastatic renal cell carcinoma.    2. DVT.   3. Postphlebitic syndrome.   4. Bone mets.   5. Cellulitis.   6. Type II diabetes  7.  Chemo induced anemia  1. ECOG 1    PLAN:      1. Patient is on toriselweekly will continue same chemotherapy at this time.  Check CMP  2. Patient is on Xarelto she will continue with indefinitely  3. Continue the vaginal patch and Lortab as needed  4. Continue with  5. Diabetic foot ulcer is improving patient completed antibiotic  6. Blood sugars are controlled she reports that they are between   7. Hemoglobin is low at 7.6.  We will initiate Procrit protocol she received Injectafer infusion.    I have reviewed labs results, imaging, vitals, and medications with the patient today.           Patient verbalized understanding and is in agreement of the above plan.  I will see her back in the office in 4-week

## 2019-06-26 NOTE — TELEPHONE ENCOUNTER
Patient did not qualify for Procrit and required injectafer. Patient completed injectafer infusions and new iron studies ordered per Dr. Fenton. Reevaluate Procrit eligibility when results obtained.

## 2019-06-26 NOTE — TELEPHONE ENCOUNTER
Loren Ledezma APRN Champion, Tracey S, MA             I don't see where this patient is on Procrit.       Can you please clarify the dosing and the frequency of the Procrit she would like.

## 2019-06-27 PROBLEM — T45.1X5A ANEMIA DUE TO ANTINEOPLASTIC CHEMOTHERAPY: Status: ACTIVE | Noted: 2019-01-01

## 2019-06-27 PROBLEM — D64.81 ANEMIA DUE TO ANTINEOPLASTIC CHEMOTHERAPY: Status: ACTIVE | Noted: 2019-01-01

## 2019-07-02 NOTE — TELEPHONE ENCOUNTER
Attempted to call again-phone busy.  I did speak with elizabeth this morning and she will pass on info.

## 2019-07-02 NOTE — TELEPHONE ENCOUNTER
Spoke w/ pt and informed her of her next chemo appt on 7/8/19 at 1:30.  Pt v/u of date and time.  Additional chemo appts scheduled and will need to be given to pt at her next appt.

## 2019-07-03 NOTE — PROGRESS NOTES
Loren SAL will forward the labs to Dr. Carr for management.     Electronically signed by TOMMY Sy, 07/03/19, 8:19 AM.

## 2019-07-08 NOTE — TELEPHONE ENCOUNTER
Received call from [facility], reporting glucose of 43.  Chart reviewed.  Patient with metastatic renal cell CA and type II DM.  Per med list, patient taking Glipizide ER 2.5 mg in the AM and glipizide 10 mg daily.  Patient currently receiving Torisel.  Patient saw Dr. Fenton on 6-7-19 and is scheduled to see her on 7-23-19.  hiram Barnhart

## 2019-07-08 NOTE — PROGRESS NOTES
"  Subjective     HISTORY OF PRESENT ILLNESS:     I spoke with Rachele Carrillo NP to receive order for pt to have a doppler on left upper extremity. Left arm edema more than usual, hx of blood clot in left arm, and warmth.       ***    Past Medical History, Past Surgical History, Social History, Family History have been reviewed and are without significant changes except as mentioned.    Review of Systems   A comprehensive 14 point review of systems was performed and was negative except as mentioned.    Medications:  The current medication list was reviewed in the EMR    ALLERGIES:  No Known Allergies    Objective      Vitals:    07/08/19 1424   BP: 101/58   Pulse: 59   Resp: 20   Temp: 98.3 °F (36.8 °C)   TempSrc: Oral   Weight: 78.9 kg (174 lb)   Height: 170.2 cm (67.01\")   PainSc: 0-No pain     Current Status 7/8/2019   ECOG score 1       Physical Exam  ***    RECENT LABS:  Hematology WBC   Date Value Ref Range Status   07/08/2019 4.45 3.40 - 10.80 10*3/mm3 Final     RBC   Date Value Ref Range Status   07/08/2019 3.31 (L) 3.77 - 5.28 10*6/mm3 Final     Hemoglobin   Date Value Ref Range Status   07/08/2019 8.0 (L) 12.0 - 15.9 g/dL Final     Hematocrit   Date Value Ref Range Status   07/08/2019 26.5 (L) 34.0 - 46.6 % Final     Platelets   Date Value Ref Range Status   07/08/2019 296 140 - 450 10*3/mm3 Final              Assessment/Plan   ***                  7/8/2019      CC:          "

## 2019-07-08 NOTE — TELEPHONE ENCOUNTER
Attempted to contact patient regarding the low glucose and also get the correct dosage of Glipizide.  Had to LM on VM asking her to call back.  rosa Barnharta

## 2019-07-08 NOTE — TELEPHONE ENCOUNTER
Recv'd fax request from [facility], to draw labs at next visit. Pt coming in today. Entered orders and faxed request given to MR for scanning.

## 2019-07-08 NOTE — PROGRESS NOTES
I spoke with Rachele Carrillo NP to receive order for pt to have a doppler on left upper extremity. Left arm edema more than usual, hx of blood clot in left arm, and warmth to touch.     Received a call from [facility] lab stating pt's glucose level 43, pt states she has had 2 packs of  peanut butter crackers and coke to drink after we emory labs. Also patient's calcium came back at 6.9. I discussed this with Rachele Carrillo NP and patient needs to return in the am for 1/2NS 1L over 3 hrs and repeat labs. She was instructed to take tums 500mg qDaily and to see Dr. Lilly BALBUENA. Labs from today will be sent to Dr. Carr.     April made aware to schedule pt for 7/9 at 0800.

## 2019-07-09 PROBLEM — N28.9 ACUTE ON CHRONIC RENAL INSUFFICIENCY: Status: ACTIVE | Noted: 2019-01-01

## 2019-07-09 PROBLEM — N18.9 ACUTE ON CHRONIC RENAL INSUFFICIENCY: Status: ACTIVE | Noted: 2019-01-01

## 2019-07-09 PROBLEM — I10 ESSENTIAL HYPERTENSION: Status: ACTIVE | Noted: 2019-01-01

## 2019-07-09 PROBLEM — Z95.0 PRESENCE OF CARDIAC PACEMAKER: Status: ACTIVE | Noted: 2019-01-01

## 2019-07-09 PROBLEM — R06.09 DYSPNEA ON EXERTION: Status: ACTIVE | Noted: 2019-01-01

## 2019-07-09 PROBLEM — I49.5 SICK SINUS SYNDROME (HCC): Status: ACTIVE | Noted: 2019-01-01

## 2019-07-09 PROBLEM — I25.10 CORONARY ARTERY DISEASE INVOLVING NATIVE CORONARY ARTERY OF NATIVE HEART WITHOUT ANGINA PECTORIS: Status: ACTIVE | Noted: 2019-01-01

## 2019-07-09 NOTE — TELEPHONE ENCOUNTER
Reviewed labs from heme-- bs was low    Stop glipizide    Also, cr was up--stop lisinopril until seen by dr nazario

## 2019-07-09 NOTE — PROGRESS NOTES
Cardiology Office Follow Up Visit      Primary Care Provider:  Mercedez Dallas MD    Reason for f/u:     SSS  Dual Chamber PM  HTN  CAD      Subjective     Denies chest pain or dyspnea at rest      History of Present Illness       Caroline Truong is a 73 y.o. female.  She presents today for follow-up regarding her history of sick sinus syndrome, previous dual chamber [employer] pacemaker, hypertension and coronary artery disease.    Previous cardiac catheterization showed 30% proximal LAD lesion otherwise no additional coronary artery disease.  Ejection fracture the last assessment was 60%.    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  Next    She can planes of chronic dyspnea with exertion that she says is unchanged.  She follows routinely with Dr. Sethi and says he has not wanted to start her on diuretics due to her kidney function.    She denies chest pain PND, orthopnea, palpitations, near syncope, or feelings of her heart racing.  She has had problems with some lower extremity edema that improves after she gone to bed at night and slept with leg elevated.    Echocardiogram performed on 4/20/2019 showed ejection fraction of 60% no significant valvular fluid normalities.  RVSP was 57 mmHg.  Comparing this to last echo cardiogram 5 years ago her RVSP was approximately 50 mmHg Next    She reports compliance with medical therapy        Past Medical History:   Diagnosis Date   • Abnormal laboratory test 12/07/2018   • Anemia 12/11/2018   • Atrioventricular block, complete (CMS/[facility]) 08/29/2014   • Bilateral leg edema 09/09/2014   • Bradycardia 08/29/2014   • Chest discomfort 08/24/2014   • Clear cell carcinoma of kidney (CMS/[facility]) 01/28/2019   • Deep venous thrombosis of arm (CMS/[facility]) 11/19/2018   • Diabetes mellitus (CMS/[facility]) 10/22/2018   • Dyspnea 10/15/2018   • Dyspnea 08/29/2014   •  "Fatigue 10/15/2018   • Hypertension 08/29/2014   • Localized swelling, left upper limb 11/16/2018   • Osteopenia 10/30/2018   • Overweight 10/15/2018   • Pacemaker 09/30/2014    permanent   • Postmenopausal status 10/15/2018   • Preop examination 11/30/2018   • Renal insufficiency 12/11/2018       Past Surgical History:   Procedure Laterality Date   • CARDIAC CATHETERIZATION  03/14/2017   • PACEMAKER IMPLANTATION  09/18/2014    Dual chamber- BS   • PACEMAKER IMPLANTATION     • TOTAL ABDOMINAL HYSTERECTOMY  2000         Current Outpatient Medications:   •  ACCU-CHEK FASTCLIX LANCETS misc, ACCU-CHEK FASTCLIX LANCETS, Disp: , Rfl:   •  Blood Glucose Monitoring Suppl (ACCU-CHEK JESSIE PLUS) w/Device kit, ACCU-CHEK JESSIE PLUS w/Device KIT, Disp: , Rfl:   •  carvedilol (COREG) 3.125 MG tablet, Every 12 (Twelve) Hours., Disp: , Rfl:   •  Cyanocobalamin (B-12 COMPLIANCE INJECTION) 1000 MCG/ML kit, Inject 1 mL as directed Every 30 (Thirty) Days., Disp: , Rfl:   •  fenofibrate 160 MG tablet, Take 160 mg by mouth Daily., Disp: , Rfl:   •  fentaNYL (DURAGESIC) 100 MCG/HR patch, Place 1 patch on the skin as directed by provider Every 72 (Seventy-Two) Hours., Disp: 10 patch, Rfl: 0  •  glipiZIDE (GLUCOTROL) 10 MG tablet, Take 10 mg by mouth Daily., Disp: , Rfl:   •  glucose blood (ACCU-CHEK JESSIE PLUS) test strip, ACCU-CHEK JESSIE PLUS STRP, Disp: , Rfl:   •  hydrALAZINE (APRESOLINE) 50 MG tablet, Every 12 (Twelve) Hours., Disp: , Rfl:   •  Insulin Glargine (LANTUS SOLOSTAR) 100 UNIT/ML injection pen, Inject 7 Units under the skin into the appropriate area as directed Daily., Disp: 15 pen, Rfl: 0  •  Insulin Pen Needle (PEN NEEDLES 3/16\") 31G X 5 MM misc, PEN NEEDLES 3/16\" 31G X 5 MM, Disp: , Rfl:   •  Lancets Misc. (ACCU-CHEK SOFTCLIX LANCET DEV) kit, ACCU-CHEK SOFTCLIX LANCET DEV KIT, Disp: , Rfl:   •  magnesium oxide (MAG-OX) 400 MG tablet, Take 400 mg by mouth Daily., Disp: , Rfl:   •  ondansetron (ZOFRAN) 8 MG tablet, Take  by " mouth Every 8 (Eight) Hours As Needed for Nausea or Vomiting., Disp: , Rfl:   •  pantoprazole (PROTONIX) 20 MG EC tablet, Take 20 mg by mouth Daily., Disp: , Rfl:   •  promethazine (PHENERGAN) 25 MG tablet, Take 25 mg by mouth Every 6 (Six) Hours As Needed for Nausea or Vomiting., Disp: , Rfl:   •  vitamin C (ASCORBIC ACID) 500 MG tablet, Take 1,000 mg by mouth Daily., Disp: , Rfl:   •  XARELTO 20 MG tablet, TAKE ONE TABLET BY MOUTH DAILY, Disp: 30 tablet, Rfl: 3  •  Zinc Gluconate 50 MG capsule, Take  by mouth., Disp: , Rfl:   No current facility-administered medications for this visit.     Facility-Administered Medications Ordered in Other Visits:   •  heparin flush (porcine) 100 UNIT/ML injection 500 Units, 500 Units, Intravenous, PRN, Franklin García MD, 500 Units at 07/09/19 1208  •  sodium chloride 0.9 % flush 10 mL, 10 mL, Intravenous, PRN, Franklin García MD, 10 mL at 07/09/19 1206    Social History     Socioeconomic History   • Marital status:      Spouse name: Not on file   • Number of children: Not on file   • Years of education: Not on file   • Highest education level: Not on file   Tobacco Use   • Smoking status: Never Smoker   • Smokeless tobacco: Never Used   Substance and Sexual Activity   • Alcohol use: No     Frequency: Never   • Drug use: No   • Sexual activity: No       Family History   Problem Relation Age of Onset   • Hypertension Mother        The following portions of the patient's history were reviewed and updated as appropriate: allergies, current medications, past family history, past medical history, past social history, past surgical history and problem list.    Review of Systems   Constitution: Positive for weakness and malaise/fatigue. Negative for decreased appetite and diaphoresis.   HENT: Negative for congestion, hearing loss and nosebleeds.    Cardiovascular: Positive for dyspnea on exertion and leg swelling. Negative for chest pain, claudication, irregular  "heartbeat, near-syncope, orthopnea, palpitations, paroxysmal nocturnal dyspnea and syncope.   Respiratory: Negative for cough, shortness of breath and sleep disturbances due to breathing.    Endocrine: Negative for polyuria.   Hematologic/Lymphatic: Does not bruise/bleed easily.   Skin: Negative for itching and rash.   Musculoskeletal: Negative for back pain, muscle weakness and myalgias.   Gastrointestinal: Negative for abdominal pain, change in bowel habit and nausea.   Genitourinary: Negative for dysuria, flank pain, frequency and hesitancy.   Neurological: Negative for dizziness and tremors.   Psychiatric/Behavioral: Negative for altered mental status. The patient does not have insomnia.      /68   Pulse 67   Ht 170.2 cm (67\")   Wt 81.2 kg (179 lb)   BMI 28.04 kg/m² .  Objective     Physical Exam   Constitutional: She is oriented to person, place, and time. She appears well-developed and well-nourished. No distress.   HENT:   Head: Normocephalic and atraumatic.   Eyes: Pupils are equal, round, and reactive to light.   Neck: Normal range of motion. Neck supple. No JVD present.   Cardiovascular: Normal rate, regular rhythm, S1 normal, S2 normal, normal heart sounds and intact distal pulses.   No murmur heard.  Pulmonary/Chest: Effort normal. She has rales.   Right basilar rales   Abdominal: Soft. Normal appearance. She exhibits no distension. There is no tenderness.   Musculoskeletal: Normal range of motion. She exhibits edema.   Neurological: She is alert and oriented to person, place, and time.   Skin: Skin is warm and dry.   Psychiatric: She has a normal mood and affect.         EKG: EKG shows atrial paced rhythm with heart rate of 67 septal Q waves cannot be ruled out..    In Office Device Interrogation:     DEVICE INTERROGATION:  IN OFFICE    DEVICE TYPE: Dual-chamber pacemaker    : [employer]    BATTERY:  Stable    TIME TO ELECTIVE REPLACEMENT INDICATORS: 4-year    CHARGE TIME: " Nonapplicable        LEAD DATA:    Atrial:   3.2 V, 311 ohms, 0.8 V@0.4 ms    Ventricular:     10.7 V, 311 ohms, 0.9 V@0.4 ms          Atrial pacing percentage: 99 %    Ventricular pacing percentage: Less than 1 %      Arrhythmia Logbook Reviewed: No A. fib        Summary:    Stable Device Function    No significant arhythmia burden.     Battery status is stable.      NEXT IN OFFICE DEVICE CHECK DUE: 1 year  REMOTE DEVICE INTERROGATIONS: Continue remote variations every 3 months      Diagnoses and all orders for this visit:    1. Sick sinus syndrome (CMS/HCC) (Primary): Stable    2. Presence of cardiac pacemaker: Stable device function    3. Coronary artery disease involving native coronary artery of native heart without angina pectoris: No signs and symptoms of angina    4. Essential hypertension: Blood pressure stable on current medications    5. Dyspnea on Exertion: Chronic condition for patient.  Would consider low-dose diuretic at least on an as-needed basis if nephrology agrees.  Patient to discuss with nephrology at follow-up       Plan:  Continue current occasions as prescribed  Advised patient to discuss with Dr. Sethi possibility of low-dose diuretic.  Remote device ulcerations every 3 months  Return office for scheduled follow-up in 1 year but advised patient and her daughter if she has any new or worsening probably be happy to see her in point sooner       Next follow-up appointment: Scheduled for 1 year

## 2019-07-09 NOTE — PROGRESS NOTES
Received request from Sumi Peña RN for refill of patient's fentanyl 100 mcg/h patches.  Inspect reviewed.  Chart reviewed with patient tolerating current dose.  We will continue to monitor renal function.  Refill E scribed.

## 2019-07-09 NOTE — TELEPHONE ENCOUNTER
Left detailed message to stop glipizide and lisinopril. Continue insulin. Call/see dr. Bloom. Advised to call back with any questions.

## 2019-07-09 NOTE — ADDENDUM NOTE
Encounter addended by: Priscilla Peña RN on: 7/9/2019 2:37 PM   Actions taken: Charge Capture section accepted

## 2019-07-11 NOTE — TELEPHONE ENCOUNTER
Called pt and informed her MRI of elbow ordered. Someone will contact her to schedule once insurance approves. Pt gave v/u.

## 2019-07-11 NOTE — TELEPHONE ENCOUNTER
Patient states she has appt on 7-15-19 for chemo.  She needs to change the time to 1:00 PM or 2:00 PM.  hiram Barnhart

## 2019-07-11 NOTE — TELEPHONE ENCOUNTER
----- Message from TOMMY Guerra sent at 7/10/2019  1:04 PM EDT -----  Please call patient and let her know an MRI of her left elbow was ordered because there was an abnormality on the Doppler of her left arm.  Thank you

## 2019-07-12 NOTE — TELEPHONE ENCOUNTER
Pt called and LM. I called pt back.Pt said she has a pacemaker so cannot get an MRI. Told pt I will see if NP has alternative order. May need to review with Dr. Fenton and then call her back. Pt gave v/u.

## 2019-07-15 NOTE — PROGRESS NOTES
Patient here for Torisel and Injectafer. She complains of burning with urination and states she's had some blood in her urine. She said that Dr. Sethi told her to have us check her urine and kidney function. There are no labs ordered from Dr. Sethi. I discussed patient's concerns with Lottie De La Garza NP and she ordered a BMP and a urinalysis with culture if indicated.   Patient's Hgb is 7.9  and she complains that she does feel more SOA than normal. Her O2 Sat is 95% on room air and she is getting injectafer today. Lottie said it was ok to treat her with her torisel today. Patient was unable to give a urine sample until the end of her treatment. When she gave me her urine it was dark red, gross hematuria. I showed it to Lottie and she said to still proceed with the urinalysis and culture but to have the patient call Dr. Sethi tomorrow and let him know how much blood she is having with urination and that we did her lab work that he wanted. Patient verbalized understanding.

## 2019-07-17 PROBLEM — E11.8 DISORDER ASSOCIATED WITH TYPE 2 DIABETES MELLITUS (HCC): Status: ACTIVE | Noted: 2019-01-01

## 2019-07-17 NOTE — TELEPHONE ENCOUNTER
"Since stopping glipizide bs have been running in the 120's, mostly in the \"good range\", but once in a while will be 120    Notified. To stay off glipizide and that script for test strips with dx code were faxed to pharmacy.  "

## 2019-07-17 NOTE — TELEPHONE ENCOUNTER
Patient states she is not feeling good and wants to cancel tomorrow's lab appt.  She states she will have labs drawn on 7/22/19 when she gets her chemo.  hiram Barnhart

## 2019-07-18 NOTE — TELEPHONE ENCOUNTER
I saw that patient had called to cancel her appt for a CBC today because she didn't feel good. I discussed this with Lottie De La Garza NP b/c I was really concerned about the patient since she had so much blood in her urine on Monday. I called the patient and told her that it was important that she come today to make sure her Hgb hadn't continued to drop b/c if so she may need a blood transfusion. Patient states she just wants to wait until Monday to come in. She sounds SOA over the phone so I asked her about it and she said that it is not a new thing. I asked if it was worse than normal and she said yes. She still does not want to come in until Monday but asked if I could move her appt up earlier in case she would need a blood transfusion. I told her I would work on getting the appt moved and call her back.    I asked if she spoke with Dr. Sethi as instructed on Monday and she states that she did and that he sent her in some prescriptions for doxycycline and pyridium.

## 2019-07-22 NOTE — PROGRESS NOTES
Patient here for Torisel. However, she is still having gross hematuria and had 3+ protein in her urine. She is taking doxycycline prescribed by Dr. Sethi and states that he wants her to make an appt with Dr. Galo (urology). I discussed this with Dr. Fenton and she said to hold her treatment until seen by Dr. Galo.     Also Patient was due for xgeva but her calcium had been low. I rechecked a CMP and Dr. Fenton said she should be able to get the xgeva tomorrow after she sees her b/c her corrected calcium level is normal.     Patient is waiting to have a CT scan done of her left upper extremity due to an abnormality on her doppler. Her left arm has 3+ edema. The CT has not been scheduled yet so I checked into it and they just forwarded the order to the hospital today so they should be contacting the patient to schedule it soon.

## 2019-07-23 PROBLEM — K90.9 IRON MALABSORPTION: Status: ACTIVE | Noted: 2019-01-01

## 2019-07-23 NOTE — PROGRESS NOTES
Please fax this result to Dr. Sethi.  Also please schedule her for a CBC on 7/29-I'll put the order in.

## 2019-07-23 NOTE — PROGRESS NOTES
Pt seen Dr. Fenton today and was sent back to nursing to receive an Xgeva injection. When reviewing patients labs today calcium level is 7.5. I spoke with Dr. Fenton in regards to level and she states to Hold Xgeva injection until next schedule time to be given. I deferred date to 8/6 and commented on plan due to hypocalcemia. I let patient and her caregiver know this information, they both verbalized understanding of this information.     Per Aimee Murray and Dr. Fenton ok to give Procrit today without authorization due to it being a medical necessity.

## 2019-07-23 NOTE — PROGRESS NOTES
Hematology/Oncology Outpatient Follow Up    Caroline Truong  1945    Primary Care Physician: Mercedez Dallas MD  Referring Physician: Mercedez Dallas*  Chief complaint:  1. Metastatic renal cell carcinoma.   2. Bone mets.   3. Left upper extremity unprovoked DVT.       History of Present Illness:     Ms. Truong does not have a personal or family history of blood   clotting disorder.  She reports about four weeks prior to presentation, she noticed swelling in   her left arm.  Slowly it was getting bigger and pain started, and she sought medical attention.    · 11/16/18 - Doppler ultrasound of the left upper extremity was obtained which revealed acute DVT   involving the left subclavian vein, axillary vein and brachial vein.  Superficial venous   thrombosis involving the left basilic vein.    · 11/30/18 - VQ scan was obtained, which was low probability of PE.    · Patient was sent to the [facility] and seen initially on 12/5/18.  · 12/11/18 - CT scan of the chest, abdomen and pelvis done without contrast secondary to renal   insufficiency showed multiple small pulmonary nodules.  Largest measuring 5 mm within the right   lower lobe, indeterminate, but may represent small pulmonary metastasis.  Follow up recommended.    Enlarged AP window lymph node 1.4 x 2.8 cm.  Pathological fractures involving the left 1st rib   and right 7th rib with underlying lytic lesions suspicious for osseous metastatic disease or   myeloma.       Exophytic lobular intermediate density lesion arising from the lower pole of the right kidney,   suspicious for a primary neoplasm.  Evaluation limited due to lack of IV contrast.  Lytic lesions   involving the posteromedial right acetabular wall and left iliac vein suspicious for osseous   metastatic disease or myeloma.     · 12/21/18 - Chrisman-lambda free light chain ratio 0.98.  Serum electrophoresis normal.  Serum   immunofixation normal.  Urine  immunofixation normal.  B12 495.  Beta 2 microglobulin 1.36.    Immunoglobulin G and M normal.  Haptoglobin 157 normal.  Ferritin 138.  Folic acid 13.1.    Creatinine 1.5.  Retic count 1.26.  Hemoccult negative x3.    · Patient was continued on indefinite Xarelto for her DVT.   2.   Oncologic History:    · 1/11/19 - Bone scan showed uptake within the posterior left 1st rib, posterior right 7th rib, L1   vertebral body and left iliac crest consistent with known lytic lesions seen on CT of 12/11/18.    Consistent with metastatic disease.    · 1/24/19 - CT-guided biopsy of the left iliac crest positive for metastatic clear cell renal cell   carcinoma.    · 2/4/19 - PET/CT scan showed hypermetabolic 2.3 x 4.6 cm right renal mass most consistent with   renal cell carcinoma.  An adjacent 12 mm right for renal soft tissue nodule consistent with   metastatic nodule.  Right retroperitoneal hypermetabolic lymphadenopathy.  Multiple small   pulmonary nodules worrisome for hematogenous pulmonary metastases.  Multiple lytic metastases in   the chest, abdomen and pelvis which have developed or enlarged since December 2018.   · 2/18/19 - Patient started Votrient 800 mg to take once a day.   · 3/4/19 - CT scan of the chest with PE protocol.  No evidence of PE.  Worsening of metastatic   disease, but no definite new lesions.  No fractures.  Stable tiny bilateral pulmonary nodules.    · 3/11/19 - Patient’s chemotherapy was switched to weekly Torisel at 25 mg (Votrient was stopped   secondary to tumor pain).   · 5/28/19 - PET/CT scan showed overall appears to be improvement from prior exam, specifically   multiple pulmonary nodules have improved have prior study and mediastinal lymphadenopathy has   improved.  There appears to be interval central necrosis involving an osseous metastasis along   the left iliac vein.  4.1 cm right renal mass demonstrates suggestion of some central necrosis.    A previously described lymph node anterior  to the psoas muscle is no longer well visualized.  A   right retroperitoneal FDG avid lymph node and an FDG avid soft tissue nodule in the right   pararenal space appears stable.    CT of upper extremity left without contrast  -1. Destructive lesion present within the distal humerus as well as the  proximal ulna consistent with metastatic disease.  2. Osseous metastasis within the bilateral ribs and spine as above.  3. Small bilateral pleural effusions with presence of cardiomegaly.  Patchy airspace disease is present within the lung bases bilaterally  which may be partially related to atelectasis and/or pneumonia.  Evaluation limited due to respiratory motion.     Electronically Signed ByRaad Saini On:7/23/2019 10:30 AM      Past Medical History:   Diagnosis Date   • Abnormal laboratory test 12/07/2018   • Anemia 12/11/2018   • Atrioventricular block, complete (CMS/HCC) 08/29/2014   • Bilateral leg edema 09/09/2014   • Bradycardia 08/29/2014   • Chest discomfort 08/24/2014   • Clear cell carcinoma of kidney (CMS/HCC) 01/28/2019   • Deep venous thrombosis of arm (CMS/HCC) 11/19/2018   • Diabetes mellitus (CMS/HCC) 10/22/2018   • Dyspnea 10/15/2018   • Dyspnea 08/29/2014   • Fatigue 10/15/2018   • Hypertension 08/29/2014   • Localized swelling, left upper limb 11/16/2018   • Osteopenia 10/30/2018   • Overweight 10/15/2018   • Pacemaker 09/30/2014    permanent   • Postmenopausal status 10/15/2018   • Preop examination 11/30/2018   • Renal insufficiency 12/11/2018       Past Surgical History:   Procedure Laterality Date   • CARDIAC CATHETERIZATION  03/14/2017   • PACEMAKER IMPLANTATION  09/18/2014    Dual chamber- BS   • PACEMAKER IMPLANTATION     • TOTAL ABDOMINAL HYSTERECTOMY  2000         Current Outpatient Medications:   •  calcium carbonate (TUMS) 500 MG chewable tablet, Chew 500 mg 2 (Two) Times a Day., Disp: 60 tablet, Rfl: 3  •  carvedilol (COREG) 3.125 MG tablet, Every 12 (Twelve) Hours., Disp: , Rfl:   •   "Cyanocobalamin (B-12 COMPLIANCE INJECTION) 1000 MCG/ML kit, Inject 1 mL as directed Every 30 (Thirty) Days., Disp: , Rfl:   •  DOXYCYCLINE MONOHYDRATE PO, Take 100 mg by mouth 2 (Two) Times a Day., Disp: , Rfl:   •  fenofibrate 160 MG tablet, Take 160 mg by mouth Daily., Disp: , Rfl:   •  fentaNYL (DURAGESIC) 100 MCG/HR patch, Place 1 patch on the skin as directed by provider Every 72 (Seventy-Two) Hours., Disp: 10 patch, Rfl: 0  •  hydrALAZINE (APRESOLINE) 50 MG tablet, Every 12 (Twelve) Hours., Disp: , Rfl:   •  Insulin Glargine (LANTUS SOLOSTAR) 100 UNIT/ML injection pen, Inject 7 Units under the skin into the appropriate area as directed Daily., Disp: 15 pen, Rfl: 0  •  Insulin Pen Needle (PEN NEEDLES 3/16\") 31G X 5 MM misc, PEN NEEDLES 3/16\" 31G X 5 MM, Disp: , Rfl:   •  K Phos Coffey-Sod Phos Di & Coffey (PHOSPHOROUS PO), Take  by mouth., Disp: , Rfl:   •  magnesium oxide (MAG-OX) 400 MG tablet, Take 400 mg by mouth Daily., Disp: , Rfl:   •  ondansetron (ZOFRAN) 8 MG tablet, Take  by mouth Every 8 (Eight) Hours As Needed for Nausea or Vomiting., Disp: , Rfl:   •  promethazine (PHENERGAN) 25 MG tablet, Take 25 mg by mouth Every 6 (Six) Hours As Needed for Nausea or Vomiting., Disp: , Rfl:   •  vitamin C (ASCORBIC ACID) 500 MG tablet, Take 1,000 mg by mouth Daily., Disp: , Rfl:   •  XARELTO 20 MG tablet, TAKE ONE TABLET BY MOUTH DAILY, Disp: 30 tablet, Rfl: 3  •  Zinc Gluconate 50 MG capsule, Take  by mouth., Disp: , Rfl:   •  ACCU-CHEK FASTCLIX LANCETS misc, ACCU-CHEK FASTCLIX LANCETS, Disp: , Rfl:   •  Blood Glucose Monitoring Suppl (ACCU-CHEK JESSIE PLUS) w/Device kit, ACCU-CHEK JESSIE PLUS w/Device KIT, Disp: , Rfl:   •  glucose blood (ACCU-CHEK JESSIE PLUS) test strip, ACCU-CHEK JESSIE PLUS STRP, Disp: , Rfl:   •  glucose blood (ACCU-CHEK JESSIE) test strip, 1 each by Other route Daily. Dx-E11.65, Disp: 100 each, Rfl: 12  •  Lancets Misc. (ACCU-CHEK SOFTCLIX LANCET DEV) kit, ACCU-CHEK SOFTCLIX LANCET DEV KIT, Disp: " ", Rfl:   •  pantoprazole (PROTONIX) 20 MG EC tablet, Take 20 mg by mouth Daily., Disp: , Rfl:     No Known Allergies    Family History   Problem Relation Age of Onset   • Hypertension Mother        Cancer-related family history is not on file.    Social History     Tobacco Use   • Smoking status: Never Smoker   • Smokeless tobacco: Never Used   Substance Use Topics   • Alcohol use: No     Frequency: Never   • Drug use: No       I have reviewed the history of present illness, past medical history, family history, social history, lab results, all notes and other records since the patient was last seen on 6/17/2019    Subjective   Pain and swelling in the left arm persistent.  Pain and swelling is worsening.  Had hematuria with red bright red blood in the urine stopped Eliquis yesterday.  She now reports that she is passing clots in the urine.  Has back pain fentanyl patch is helping fatigue and tired lost some weight        ROS:       Review of Systems   HENT: Negative for nosebleeds and trouble swallowing.    Eyes: Negative for visual disturbance.   Respiratory: Negative for cough, shortness of breath and wheezing.    Cardiovascular: Negative for chest pain.   Gastrointestinal: Negative for abdominal pain and blood in stool.   Genitourinary: Negative for dysuria and hematuria.   Musculoskeletal: Negative for joint swelling.   Skin: Negative for rash.   Neurological: Negative for seizures.   Hematological: Does not bruise/bleed easily.   Psychiatric/Behavioral: The patient is not nervous/anxious.          Objective:       Vitals:    07/23/19 1132   BP: 147/67   Pulse: 58   Resp: 18   Temp: 97.7 °F (36.5 °C)   Weight: 75.8 kg (167 lb 3.2 oz)   Height: 170.2 cm (67\")   PainSc:   6   PainLoc: Arm  Comment: thighs hurt as well         Physical Exam   Constitutional: She is oriented to person, place, and time. No distress.   HENT:   Head: Normocephalic and atraumatic.   Eyes: Conjunctivae and EOM are normal. Right eye " exhibits no discharge. Left eye exhibits no discharge. No scleral icterus.   Neck: Trachea normal and normal range of motion. Neck supple. No thyromegaly present.   Cardiovascular: Normal rate, regular rhythm, S1 normal, S2 normal and normal heart sounds. Exam reveals no gallop and no friction rub.   Pulmonary/Chest: Effort normal and breath sounds normal. No stridor. No respiratory distress. She has no wheezes.   Abdominal: Soft. Bowel sounds are normal. She exhibits no mass. There is no hepatosplenomegaly. There is no tenderness. There is no rebound and no guarding.   Musculoskeletal: Normal range of motion. She exhibits no tenderness.   Left upper extremity swollen, swelling and tenderness of the left elbow, pulses palpable  Bilateral lower extremity swelling in the thighs no ankle swelling   Lymphadenopathy:     She has no cervical adenopathy.   Neurological: She is alert and oriented to person, place, and time. She has normal strength. She exhibits normal muscle tone.   Skin: Skin is warm and dry. No rash noted. She is not diaphoretic. No erythema.   Psychiatric: She has a normal mood and affect. Her speech is normal and behavior is normal.   Nursing note and vitals reviewed.    Left upper extremity swelling  RECENT LABS:     WBC   Date Value Ref Range Status   07/23/2019 3.85 3.40 - 10.80 10*3/mm3 Final     RBC   Date Value Ref Range Status   07/23/2019 3.45 (L) 3.77 - 5.28 10*6/mm3 Final     Hemoglobin   Date Value Ref Range Status   07/23/2019 8.3 (L) 12.0 - 15.9 g/dL Final     Hematocrit   Date Value Ref Range Status   07/23/2019 27.3 (L) 34.0 - 46.6 % Final     MCV   Date Value Ref Range Status   07/23/2019 79.1 79.0 - 97.0 fL Final     MCH   Date Value Ref Range Status   07/23/2019 24.1 (L) 26.6 - 33.0 pg Final     MCHC   Date Value Ref Range Status   07/23/2019 30.4 (L) 31.5 - 35.7 g/dL Final     RDW   Date Value Ref Range Status   07/23/2019 19.2 (H) 12.3 - 15.4 % Final     RDW-SD   Date Value Ref Range  Status   07/23/2019 53.9 37.0 - 54.0 fl Final     MPV   Date Value Ref Range Status   07/23/2019 9.1 6.0 - 12.0 fL Final     Platelets   Date Value Ref Range Status   07/23/2019 214 140 - 450 10*3/mm3 Final     Neutrophil %   Date Value Ref Range Status   07/23/2019 71.1 42.7 - 76.0 % Final     Lymphocyte %   Date Value Ref Range Status   07/23/2019 8.6 (L) 19.6 - 45.3 % Final     Monocyte %   Date Value Ref Range Status   07/23/2019 16.9 (H) 5.0 - 12.0 % Final     Eosinophil %   Date Value Ref Range Status   07/23/2019 2.9 0.3 - 6.2 % Final     Basophil %   Date Value Ref Range Status   07/23/2019 0.5 0.0 - 1.5 % Final     Neutrophils, Absolute   Date Value Ref Range Status   07/23/2019 2.74 1.70 - 7.00 10*3/mm3 Final     Lymphocytes, Absolute   Date Value Ref Range Status   07/23/2019 0.33 (L) 0.70 - 3.10 10*3/mm3 Final     Monocytes, Absolute   Date Value Ref Range Status   07/23/2019 0.65 0.10 - 0.90 10*3/mm3 Final     Eosinophils, Absolute   Date Value Ref Range Status   07/23/2019 0.11 0.00 - 0.40 10*3/mm3 Final     Basophils, Absolute   Date Value Ref Range Status   07/23/2019 0.02 0.00 - 0.20 10*3/mm3 Final     nRBC   Date Value Ref Range Status   05/22/2019 0 0 /100[WBCs] Final       Lab Results   Component Value Date    GLUCOSE 100 (H) 07/23/2019    BUN 15 07/23/2019    CREATININE 1.90 (H) 07/23/2019    EGFRIFNONA 26 (L) 07/23/2019    EGFRIFAFRI 34 (L) 05/31/2019    BCR 7.9 07/23/2019    K 3.3 (L) 07/23/2019    CO2 18.0 (L) 07/23/2019    CALCIUM 7.5 (L) 07/23/2019    ALBUMIN 3.10 (L) 07/23/2019    LABIL2 1.1 06/10/2019    AST 28 07/23/2019    ALT 13 (L) 07/23/2019         Assessment/Plan      ASSESSMENT:   1. Metastatic renal cell carcinoma  2. Left upper extremity unprovoked DVT  3. Bone metastatic disease  4. Destructive bone lesion in the left distal humerus and proximal ulna  5. Gross hematuria  6. Type 2 diabetes  7. Iron deficiency  8. Hypocalcemia  9. ECOG 2      PLAN:      1. Patient is on torisel  weekly will.  There is worsening metastatic disease in the bones.  Will hold Torisel at this time  2. Patient is on Xarelto she she was held since yesterday secondary to hematuria  3. Continue the fentanyl patch and Lortab as needed  4. Continue with Xgeva.  Patient will be continued on calcium replacement with Tums  5. Patient will be sent to radiation oncology today regarding the destructive bone lesion.  6. Diabetic foot ulcer is improving patient completed antibiotics.  7. Blood sugars are controlled she reports that they are between   8. Hemoglobin is low related to chemotherapy.  Patient received Injectafer infusions.  Hemoglobin is between 7 and 8.5 continue Procrit injections once a week to avoid multiple transfusions patient is on chemotherapy.  9. Continue calcium replacement with Tums  10. I will see her back in my office in 2 weeks recheck CBC CMP at that time    I have reviewed labs results, imaging, vitals, and medications with the patient and her friend  today.      Patient verbalized understanding and is in agreement of the above plans.     This report was compiled using Dragon voice recognition software. I have made every effort to proof read this document; however, typographical errors may persist

## 2019-07-29 NOTE — TELEPHONE ENCOUNTER
"Case Management/ Note    Patient Name: Caroline Truong  YOB: 1945  MRN #: [id number]    OSW called patient regarding distress tool completed on 7/24. Patient indicated 'low' on stress scale. She had no noted complaints except breathing she wrote \"moderate\" and too tired to do what I need to do, weak/trouble moving and trouble lifting things as \"mild\". OSW made Dr. Crump aware of these items. She indicated on the form, \"Could use one of those new type of walkers\".     OSW called patient and received a busy signal.     Electronically signed by:   Luz Maria Ulrich LCSW, OSW-C  07/29/19, 10:36 AM        "

## 2019-07-29 NOTE — TELEPHONE ENCOUNTER
Pt states she still cannot get her strips, and she has been out for 2-3 weeks.  Pharmacy is [facility] in [place].  Please call her [phone].

## 2019-07-29 NOTE — TELEPHONE ENCOUNTER
This has been ongoing for several weeks, I have faxed this many times but they will not take a verbal. Every confirmation has the dx code and the name of test strips. I do not know what else to tell her. Faxed a handwritten script to pharmacy.

## 2019-08-06 NOTE — PROGRESS NOTES
Hematology/Oncology Outpatient Follow Up    Caroline Truong  1945    Primary Care Physician: Mercedez Dallas MD  Referring Physician: Mercedez Dallas*  Chief complaint:  1. Metastatic renal cell carcinoma.   2. Bone mets.   3. Left upper extremity unprovoked DVT.       History of Present Illness:     Ms. Truong does not have a personal or family history of blood   clotting disorder.  She reports about four weeks prior to presentation, she noticed swelling in   her left arm.  Slowly it was getting bigger and pain started, and she sought medical attention.    · 11/16/18 - Doppler ultrasound of the left upper extremity was obtained which revealed acute DVT   involving the left subclavian vein, axillary vein and brachial vein.  Superficial venous   thrombosis involving the left basilic vein.    · 11/30/18 - VQ scan was obtained, which was low probability of PE.    · Patient was sent to the [facility] and seen initially on 12/5/18.  · 12/11/18 - CT scan of the chest, abdomen and pelvis done without contrast secondary to renal   insufficiency showed multiple small pulmonary nodules.  Largest measuring 5 mm within the right   lower lobe, indeterminate, but may represent small pulmonary metastasis.  Follow up recommended.    Enlarged AP window lymph node 1.4 x 2.8 cm.  Pathological fractures involving the left 1st rib   and right 7th rib with underlying lytic lesions suspicious for osseous metastatic disease or   myeloma.       Exophytic lobular intermediate density lesion arising from the lower pole of the right kidney,   suspicious for a primary neoplasm.  Evaluation limited due to lack of IV contrast.  Lytic lesions   involving the posteromedial right acetabular wall and left iliac vein suspicious for osseous   metastatic disease or myeloma.     · 12/21/18 - Devens-lambda free light chain ratio 0.98.  Serum electrophoresis normal.  Serum   immunofixation normal.  Urine  immunofixation normal.  B12 495.  Beta 2 microglobulin 1.36.    Immunoglobulin G and M normal.  Haptoglobin 157 normal.  Ferritin 138.  Folic acid 13.1.    Creatinine 1.5.  Retic count 1.26.  Hemoccult negative x3.    · Patient was continued on indefinite Xarelto for her DVT.   2.   Oncologic History:    · 1/11/19 - Bone scan showed uptake within the posterior left 1st rib, posterior right 7th rib, L1   vertebral body and left iliac crest consistent with known lytic lesions seen on CT of 12/11/18.    Consistent with metastatic disease.    · 1/24/19 - CT-guided biopsy of the left iliac crest positive for metastatic clear cell renal cell   carcinoma.    · 2/4/19 - PET/CT scan showed hypermetabolic 2.3 x 4.6 cm right renal mass most consistent with   renal cell carcinoma.  An adjacent 12 mm right for renal soft tissue nodule consistent with   metastatic nodule.  Right retroperitoneal hypermetabolic lymphadenopathy.  Multiple small   pulmonary nodules worrisome for hematogenous pulmonary metastases.  Multiple lytic metastases in   the chest, abdomen and pelvis which have developed or enlarged since December 2018.   · 2/18/19 - Patient started Votrient 800 mg to take once a day.   · 3/4/19 - CT scan of the chest with PE protocol.  No evidence of PE.  Worsening of metastatic   disease, but no definite new lesions.  No fractures.  Stable tiny bilateral pulmonary nodules.    · 3/11/19 - Patient’s chemotherapy was switched to weekly Torisel at 25 mg (Votrient was stopped   secondary to tumor pain).   · 5/28/19 - PET/CT scan showed overall appears to be improvement from prior exam, specifically   multiple pulmonary nodules have improved have prior study and mediastinal lymphadenopathy has   improved.  There appears to be interval central necrosis involving an osseous metastasis along   the left iliac vein.  4.1 cm right renal mass demonstrates suggestion of some central necrosis.    A previously described lymph node anterior  to the psoas muscle is no longer well visualized.  A   right retroperitoneal FDG avid lymph node and an FDG avid soft tissue nodule in the right   pararenal space appears stable.    CT of upper extremity left without contrast  -1. Destructive lesion present within the distal humerus as well as the  proximal ulna consistent with metastatic disease.  2. Osseous metastasis within the bilateral ribs and spine as above.  3. Small bilateral pleural effusions with presence of cardiomegaly.  Patchy airspace disease is present within the lung bases bilaterally  which may be partially related to atelectasis and/or pneumonia.  Evaluation limited due to respiratory motion.     Electronically Signed By-Enedina Saini On:7/23/2019 10:30 AM  7/28/2019 patient was initiated on external beam radiation therapy for palliative measures to control pain on her left elbow where there was destructive bone lesion in the distal humerus as well as proximal ulna consistent with metastatic disease.  30 Gy in 10 fractions    Past Medical History:   Diagnosis Date   • Abnormal laboratory test 12/07/2018   • Anemia 12/11/2018   • Atrioventricular block, complete (CMS/HCC) 08/29/2014   • Bilateral leg edema 09/09/2014   • Bradycardia 08/29/2014   • Chest discomfort 08/24/2014   • Clear cell carcinoma of kidney (CMS/HCC) 01/28/2019   • Deep venous thrombosis of arm (CMS/HCC) 11/19/2018   • Diabetes mellitus (CMS/HCC) 10/22/2018   • Dyspnea 10/15/2018   • Dyspnea 08/29/2014   • Fatigue 10/15/2018   • Hypertension 08/29/2014   • Localized swelling, left upper limb 11/16/2018   • Osteopenia 10/30/2018   • Overweight 10/15/2018   • Pacemaker 09/30/2014    permanent   • Postmenopausal status 10/15/2018   • Preop examination 11/30/2018   • Renal insufficiency 12/11/2018       Past Surgical History:   Procedure Laterality Date   • CARDIAC CATHETERIZATION  03/14/2017   • PACEMAKER IMPLANTATION  09/18/2014    Dual chamber- BS   • PACEMAKER IMPLANTATION     •  "TOTAL ABDOMINAL HYSTERECTOMY  2000         Current Outpatient Medications:   •  ACCU-CHEK FASTCLIX LANCETS misc, ACCU-CHEK FASTCLIX LANCETS, Disp: , Rfl:   •  Blood Glucose Monitoring Suppl (ACCU-CHEK JESSIE PLUS) w/Device kit, ACCU-CHEK JESSIE PLUS w/Device KIT, Disp: , Rfl:   •  calcium carbonate (TUMS) 500 MG chewable tablet, Chew 500 mg 2 (Two) Times a Day., Disp: 60 tablet, Rfl: 3  •  carvedilol (COREG) 3.125 MG tablet, Every 12 (Twelve) Hours., Disp: , Rfl:   •  Cyanocobalamin (B-12 COMPLIANCE INJECTION) 1000 MCG/ML kit, Inject 1 mL as directed Every 30 (Thirty) Days., Disp: , Rfl:   •  fenofibrate 160 MG tablet, Take 160 mg by mouth Daily., Disp: , Rfl:   •  fentaNYL (DURAGESIC) 100 MCG/HR patch, Place 1 patch on the skin as directed by provider Every 72 (Seventy-Two) Hours., Disp: 10 patch, Rfl: 0  •  glucose blood (ACCU-CHEK JESSIE PLUS) test strip, ACCU-CHEK JESSIE PLUS STRP, Disp: , Rfl:   •  glucose blood (ACCU-CHEK JESSIE) test strip, 1 each by Other route Daily. Dx-E11.65, Disp: 100 each, Rfl: 12  •  hydrALAZINE (APRESOLINE) 50 MG tablet, Every 12 (Twelve) Hours., Disp: , Rfl:   •  Insulin Glargine (LANTUS SOLOSTAR) 100 UNIT/ML injection pen, Inject 7 Units under the skin into the appropriate area as directed Daily., Disp: 15 pen, Rfl: 0  •  Insulin Pen Needle (PEN NEEDLES 3/16\") 31G X 5 MM misc, PEN NEEDLES 3/16\" 31G X 5 MM, Disp: , Rfl:   •  K Phos Zapata-Sod Phos Di & Zapata (PHOSPHOROUS PO), Take  by mouth., Disp: , Rfl:   •  Lancets Misc. (ACCU-CHEK SOFTCLIX LANCET DEV) kit, ACCU-CHEK SOFTCLIX LANCET DEV KIT, Disp: , Rfl:   •  magnesium oxide (MAG-OX) 400 MG tablet, Take 400 mg by mouth Daily., Disp: , Rfl:   •  ondansetron (ZOFRAN) 8 MG tablet, Take  by mouth Every 8 (Eight) Hours As Needed for Nausea or Vomiting., Disp: , Rfl:   •  pantoprazole (PROTONIX) 20 MG EC tablet, Take 20 mg by mouth Daily., Disp: , Rfl:   •  promethazine (PHENERGAN) 25 MG tablet, TAKE ONE TABLET BY MOUTH EVERY 4 TO 6 HOURS AS " NEEDED FOR NAUSEA AND VOMITING, Disp: 20 tablet, Rfl: 2  •  vitamin C (ASCORBIC ACID) 500 MG tablet, Take 1,000 mg by mouth Daily., Disp: , Rfl:   •  XARELTO 20 MG tablet, TAKE ONE TABLET BY MOUTH DAILY, Disp: 30 tablet, Rfl: 3  •  Zinc Gluconate 50 MG capsule, Take  by mouth., Disp: , Rfl:   No current facility-administered medications for this visit.     No Known Allergies    Family History   Problem Relation Age of Onset   • Hypertension Mother        Cancer-related family history is not on file.    Social History     Tobacco Use   • Smoking status: Never Smoker   • Smokeless tobacco: Never Used   Substance Use Topics   • Alcohol use: No     Frequency: Never   • Drug use: No       I have reviewed the history of present illness, past medical history, family history, social history, lab results, all notes and other records since the patient was last seen on 6/17/2019    Subjective     Patient is my office for follow-up.  Patient is receiving radiation therapy at this time.  Tolerating it well.  Patient reports pain is better now but the swelling has increased.  Lost weight.  Hematuria has resolved.  She is back on anticoagulation.        ROS:       Review of Systems   Constitutional: Negative for fever.   HENT: Negative for nosebleeds and trouble swallowing.    Eyes: Negative for visual disturbance.   Respiratory: Negative for cough, shortness of breath and wheezing.    Cardiovascular: Negative for chest pain.   Gastrointestinal: Negative for abdominal pain and blood in stool.   Endocrine: Negative for cold intolerance.   Genitourinary: Negative for dysuria and hematuria.   Musculoskeletal: Negative for joint swelling.   Skin: Negative for rash.   Allergic/Immunologic: Negative for environmental allergies.   Neurological: Negative for seizures.   Hematological: Does not bruise/bleed easily.   Psychiatric/Behavioral: The patient is not nervous/anxious.          Objective:       Vitals:    08/06/19 1205   BP: 122/64  "  Pulse: 65   Resp: 18   Temp: 97.7 °F (36.5 °C)   Weight: 73.2 kg (161 lb 6.4 oz)   Height: 170.2 cm (67\")   PainSc:   8   PainLoc: Arm  Comment: right, elbow. since oct. aching,constant.         Physical Exam   Constitutional: She is oriented to person, place, and time. No distress.   HENT:   Head: Normocephalic and atraumatic.   Eyes: Conjunctivae and EOM are normal. Right eye exhibits no discharge. Left eye exhibits no discharge. No scleral icterus.   Neck: Trachea normal and normal range of motion. Neck supple. No thyromegaly present.   Cardiovascular: Normal rate, regular rhythm, S1 normal, S2 normal and normal heart sounds. Exam reveals no gallop and no friction rub.   Pulmonary/Chest: Effort normal and breath sounds normal. No stridor. No respiratory distress. She has no wheezes.   Abdominal: Soft. Bowel sounds are normal. She exhibits no mass. There is no hepatosplenomegaly. There is no tenderness. There is no rebound and no guarding.   Musculoskeletal: Normal range of motion. She exhibits no tenderness.   Left upper extremity swollen, swelling and tenderness of the left elbow, pulses palpable       Lymphadenopathy:     She has no cervical adenopathy.   Neurological: She is alert and oriented to person, place, and time. She has normal strength. She exhibits normal muscle tone.   Skin: Skin is warm and dry. No rash noted. She is not diaphoretic. No erythema.   Psychiatric: She has a normal mood and affect. Her speech is normal and behavior is normal.   Nursing note and vitals reviewed.    Left upper extremity swelling  RECENT LABS:     WBC   Date Value Ref Range Status   08/06/2019 9.69 3.40 - 10.80 10*3/mm3 Final     RBC   Date Value Ref Range Status   08/06/2019 3.52 (L) 3.77 - 5.28 10*6/mm3 Final     Hemoglobin   Date Value Ref Range Status   08/06/2019 9.2 (L) 12.0 - 15.9 g/dL Final     Hematocrit   Date Value Ref Range Status   08/06/2019 30.5 (L) 34.0 - 46.6 % Final     MCV   Date Value Ref Range " Status   08/06/2019 86.6 79.0 - 97.0 fL Final     MCH   Date Value Ref Range Status   08/06/2019 26.1 (L) 26.6 - 33.0 pg Final     MCHC   Date Value Ref Range Status   08/06/2019 30.2 (L) 31.5 - 35.7 g/dL Final     RDW   Date Value Ref Range Status   08/06/2019 23.7 (H) 12.3 - 15.4 % Final     RDW-SD   Date Value Ref Range Status   08/06/2019 72.0 (H) 37.0 - 54.0 fl Final     MPV   Date Value Ref Range Status   08/06/2019 9.0 6.0 - 12.0 fL Final     Platelets   Date Value Ref Range Status   08/06/2019 247 140 - 450 10*3/mm3 Final     Neutrophil %   Date Value Ref Range Status   08/06/2019 83.1 (H) 42.7 - 76.0 % Final     Lymphocyte %   Date Value Ref Range Status   08/06/2019 4.0 (L) 19.6 - 45.3 % Final     Monocyte %   Date Value Ref Range Status   08/06/2019 11.1 5.0 - 12.0 % Final     Eosinophil %   Date Value Ref Range Status   08/06/2019 1.5 0.3 - 6.2 % Final     Basophil %   Date Value Ref Range Status   08/06/2019 0.3 0.0 - 1.5 % Final     Neutrophils, Absolute   Date Value Ref Range Status   08/06/2019 8.04 (H) 1.70 - 7.00 10*3/mm3 Final     Lymphocytes, Absolute   Date Value Ref Range Status   08/06/2019 0.39 (L) 0.70 - 3.10 10*3/mm3 Final     Monocytes, Absolute   Date Value Ref Range Status   08/06/2019 1.08 (H) 0.10 - 0.90 10*3/mm3 Final     Eosinophils, Absolute   Date Value Ref Range Status   08/06/2019 0.15 0.00 - 0.40 10*3/mm3 Final     Basophils, Absolute   Date Value Ref Range Status   08/06/2019 0.03 0.00 - 0.20 10*3/mm3 Final     nRBC   Date Value Ref Range Status   05/22/2019 0 0 /100[WBCs] Final       Lab Results   Component Value Date    GLUCOSE 114 (H) 08/06/2019    BUN 19 08/06/2019    CREATININE 1.30 (H) 08/06/2019    EGFRIFNONA 40 (L) 08/06/2019    EGFRIFAFRI 34 (L) 05/31/2019    BCR 14.6 08/06/2019    K 3.4 (L) 08/06/2019    CO2 19.0 (L) 08/06/2019    CALCIUM 7.3 (L) 08/06/2019    ALBUMIN 3.00 (L) 08/06/2019    LABIL2 1.1 06/10/2019    AST 22 08/06/2019    ALT 11 (L) 08/06/2019          Assessment/Plan      ASSESSMENT:   1. Metastatic renal cell carcinoma  2. Left upper extremity unprovoked DVT  3. Bone metastatic disease  4. Destructive bone lesion in the left distal humerus and proximal ulna  5. Gross hematuria  6. Type 2 diabetes  7. Iron deficiency  8. Hypocalcemia  9. ECOG 2      PLAN:      1. Patient is off of chemotherapy at this time during radiation.  She was on Torisel when the disease progressed,  I will start switch her chemotherapy to ipilimumab along with nivolumab to start on August 19.  Chemo orders entered in epic  2. Patient is on Xarelto it was restarted by Dr. Galo after the hematuria was resolved   3. Continue the fentanyl patch and Lortab as needed  4. Continue with Xgeva.  Patient will be continued on calcium replacement with Tums  5. Patient currently is receiving radiation therapy on palliative basis in the left forearm covering the bones of distal humerus and proximal ulna .  Additional cultures pending patient evaluated by Dr. Concepcion post radiation completion .  6. Diabetic foot ulcer obliquely healed up.  7. Hemoglobin is low related to chemotherapy CRF 3 and hematuria.,  Continue Procrit per protocol restart today.    8. Continue calcium replacement with Tums  9. I will see her back in my office in 3 weeks recheck CBC CMP at that time.  Chemotherapy will be initiated on August 19    I have reviewed labs results, imaging, vitals, and medications with the patient and her friend  today.      Patient and her friend verbalized understanding and is in agreement of the above plans.     This report was compiled using Dragon voice recognition software. I have made every effort to proof read this document; however, typographical errors may persist

## 2019-08-13 NOTE — PROGRESS NOTES
Case Management/ Note    Patient Name: Caroline Truong  YOB: 1945  MRN #: [id number]    OSW met briefly with patient at her request. She states she would like a rollator walker. OSW advised her doctor would be made aware of her request. Request forwarded to MARLEE Pimentel.     Electronically signed by:   Luz Maria Ulrich LCSW, OSW-C  08/13/19, 1:42 PM

## 2019-08-13 NOTE — PROGRESS NOTES
Potassium 3.4.  No potassium or diuretics noted on medication list.  Please contact patient and have her increase potassium in diet.  If she is having significant decreased oral intake, vomiting, or diarrhea then please notify a provider for additional orders (message sent to clinical pool).

## 2019-08-13 NOTE — TELEPHONE ENCOUNTER
Potassium is low-prescription for potassium sent in    Patient needs repeat ionized calcium lab drawn    Mag is low-- is pt taking it?  Its on her med list

## 2019-08-14 NOTE — TELEPHONE ENCOUNTER
Pt called back. Said that dr. hu told her she didn't need the potassium. She had full labs done in July and said it was fine.    Attempted to call pt, phone busy

## 2019-08-16 NOTE — TELEPHONE ENCOUNTER
1)pt is taking 400mg calcium daily.  2)dr hu ok'd the K+ again, so she has started taking that.  3)she will be in on Monday to have ionized ca+ drawn

## 2019-08-19 PROBLEM — E83.42 HYPOMAGNESEMIA: Status: ACTIVE | Noted: 2019-01-01

## 2019-08-19 NOTE — PROGRESS NOTES
Case Management/ Note    Patient Name: Caroline Truong  YOB: 1945  MRN #: [id number]    OSW met with patient at her request. She is alert and oriented to person, place and time. She wants to know if Dr. Fenton approved the rollator walker; OSW called MARLEE Pimentel who will fax order and demographics to [facility]. Patient was made aware of this and was given [facility] phone number as she had questions about cost.    She said she would have full medicaid beginning September 1. No other concerns noted. OSW will remain available.     Electronically signed by:   Luz Maria Ulrich LCSW, OSW-C  08/19/19, 3:05 PM

## 2019-08-27 NOTE — PROGRESS NOTES
Hematology/Oncology Outpatient Follow Up    Caroline Truong  1945    Primary Care Physician: Mercedez Dallas MD  Referring Physician: Mercedez Dallas*  Chief complaint:  1. Metastatic renal cell carcinoma.   2. Bone mets.   3. Left upper extremity unprovoked DVT.       History of Present Illness:     Ms. Truong does not have a personal or family history of blood   clotting disorder.  She reports about four weeks prior to presentation, she noticed swelling in   her left arm.  Slowly it was getting bigger and pain started, and she sought medical attention.    · 11/16/18 - Doppler ultrasound of the left upper extremity was obtained which revealed acute DVT   involving the left subclavian vein, axillary vein and brachial vein.  Superficial venous   thrombosis involving the left basilic vein.    · 11/30/18 - VQ scan was obtained, which was low probability of PE.    · Patient was sent to the [facility] and seen initially on 12/5/18.  · 12/11/18 - CT scan of the chest, abdomen and pelvis done without contrast secondary to renal   insufficiency showed multiple small pulmonary nodules.  Largest measuring 5 mm within the right   lower lobe, indeterminate, but may represent small pulmonary metastasis.  Follow up recommended.    Enlarged AP window lymph node 1.4 x 2.8 cm.  Pathological fractures involving the left 1st rib   and right 7th rib with underlying lytic lesions suspicious for osseous metastatic disease or   myeloma.       Exophytic lobular intermediate density lesion arising from the lower pole of the right kidney,   suspicious for a primary neoplasm.  Evaluation limited due to lack of IV contrast.  Lytic lesions   involving the posteromedial right acetabular wall and left iliac vein suspicious for osseous   metastatic disease or myeloma.     · 12/21/18 - [place]-lambda free light chain ratio 0.98.  Serum electrophoresis normal.  Serum   immunofixation normal.  Urine  immunofixation normal.  B12 495.  Beta 2 microglobulin 1.36.    Immunoglobulin G and M normal.  Haptoglobin 157 normal.  Ferritin 138.  Folic acid 13.1.    Creatinine 1.5.  Retic count 1.26.  Hemoccult negative x3.    · Patient was continued on indefinite Xarelto for her DVT.   2.   Oncologic History:    · 1/11/19 - Bone scan showed uptake within the posterior left 1st rib, posterior right 7th rib, L1   vertebral body and left iliac crest consistent with known lytic lesions seen on CT of 12/11/18.    Consistent with metastatic disease.    · 1/24/19 - CT-guided biopsy of the left iliac crest positive for metastatic clear cell renal cell   carcinoma.    · 2/4/19 - PET/CT scan showed hypermetabolic 2.3 x 4.6 cm right renal mass most consistent with   renal cell carcinoma.  An adjacent 12 mm right for renal soft tissue nodule consistent with   metastatic nodule.  Right retroperitoneal hypermetabolic lymphadenopathy.  Multiple small   pulmonary nodules worrisome for hematogenous pulmonary metastases.  Multiple lytic metastases in   the chest, abdomen and pelvis which have developed or enlarged since December 2018.   · 2/18/19 - Patient started Votrient 800 mg to take once a day.   · 3/4/19 - CT scan of the chest with PE protocol.  No evidence of PE.  Worsening of metastatic   disease, but no definite new lesions.  No fractures.  Stable tiny bilateral pulmonary nodules.    · 3/11/19 - Patient’s chemotherapy was switched to weekly Torisel at 25 mg (Votrient was stopped   secondary to tumor pain).   · 5/28/19 - PET/CT scan showed overall appears to be improvement from prior exam, specifically   multiple pulmonary nodules have improved have prior study and mediastinal lymphadenopathy has   improved.  There appears to be interval central necrosis involving an osseous metastasis along   the left iliac vein.  4.1 cm right renal mass demonstrates suggestion of some central necrosis.    A previously described lymph node anterior  to the psoas muscle is no longer well visualized.  A   right retroperitoneal FDG avid lymph node and an FDG avid soft tissue nodule in the right   pararenal space appears stable.    7/23/2019 - CT of upper extremity left without contrast  -1. Destructive lesion present within the distal humerus as well as the  proximal ulna consistent with metastatic disease.  2. Osseous metastasis within the bilateral ribs and spine as above.  3. Small bilateral pleural effusions with presence of cardiomegaly.  Patchy airspace disease is present within the lung bases bilaterally  which may be partially related to atelectasis and/or pneumonia.  Evaluation limited due to respiratory motion.     7/28/2019 patient was initiated on external beam radiation therapy for palliative measures to control pain on her left elbow where there was destructive bone lesion in the distal humerus as well as proximal ulna consistent with metastatic disease.  30 Gy in 10 fractions  8/19/2019 secondary to progression of disease chemotherapy was switched to nivolumab and ipilimumab    Past Medical History:   Diagnosis Date   • Abnormal laboratory test 12/07/2018   • Anemia 12/11/2018   • Atrioventricular block, complete (CMS/HCC) 08/29/2014   • Bilateral leg edema 09/09/2014   • Bradycardia 08/29/2014   • Chest discomfort 08/24/2014   • Clear cell carcinoma of kidney (CMS/HCC) 01/28/2019   • Deep venous thrombosis of arm (CMS/HCC) 11/19/2018   • Diabetes mellitus (CMS/HCC) 10/22/2018   • Dyspnea 10/15/2018   • Dyspnea 08/29/2014   • Fatigue 10/15/2018   • Hypertension 08/29/2014   • Localized swelling, left upper limb 11/16/2018   • Osteopenia 10/30/2018   • Overweight 10/15/2018   • Pacemaker 09/30/2014    permanent   • Postmenopausal status 10/15/2018   • Preop examination 11/30/2018   • Renal insufficiency 12/11/2018       Past Surgical History:   Procedure Laterality Date   • CARDIAC CATHETERIZATION  03/14/2017   • PACEMAKER IMPLANTATION  09/18/2014     "Dual chamber- BS   • PACEMAKER IMPLANTATION     • TOTAL ABDOMINAL HYSTERECTOMY  2000         Current Outpatient Medications:   •  ACCU-CHEK FASTCLIX LANCETS misc, ACCU-CHEK FASTCLIX LANCETS, Disp: , Rfl:   •  Blood Glucose Monitoring Suppl (ACCU-CHEK JESSIE PLUS) w/Device kit, ACCU-CHEK JESSIE PLUS w/Device KIT, Disp: , Rfl:   •  calcium carbonate (TUMS) 500 MG chewable tablet, Chew 500 mg 2 (Two) Times a Day., Disp: 60 tablet, Rfl: 3  •  carvedilol (COREG) 3.125 MG tablet, Every 12 (Twelve) Hours., Disp: , Rfl:   •  Cyanocobalamin (B-12 COMPLIANCE INJECTION) 1000 MCG/ML kit, Inject 1 mL as directed Every 30 (Thirty) Days., Disp: , Rfl:   •  fenofibrate 160 MG tablet, Take 160 mg by mouth Daily., Disp: , Rfl:   •  fentaNYL (DURAGESIC) 100 MCG/HR patch, Place 1 patch on the skin as directed by provider Every 72 (Seventy-Two) Hours., Disp: 10 patch, Rfl: 0  •  glucose blood (ACCU-CHEK JESSIE PLUS) test strip, ACCU-CHEK JESSIE PLUS STRP, Disp: , Rfl:   •  glucose blood (ACCU-CHEK JESSIE) test strip, 1 each by Other route Daily. [id number], Disp: 100 each, Rfl: 12  •  hydrALAZINE (APRESOLINE) 50 MG tablet, Every 12 (Twelve) Hours., Disp: , Rfl:   •  Insulin Glargine (LANTUS SOLOSTAR) 100 UNIT/ML injection pen, Inject 7 Units under the skin into the appropriate area as directed Daily., Disp: 15 pen, Rfl: 0  •  Insulin Pen Needle (PEN NEEDLES 3/16\") 31G X 5 MM misc, PEN NEEDLES 3/16\" 31G X 5 MM, Disp: , Rfl:   •  K Phos Powell-Sod Phos Di & Powell (PHOSPHOROUS PO), Take  by mouth., Disp: , Rfl:   •  Lancets Misc. (ACCU-CHEK SOFTCLIX LANCET DEV) kit, ACCU-CHEK SOFTCLIX LANCET DEV KIT, Disp: , Rfl:   •  magnesium oxide (MAG-OX) 400 MG tablet, Take 400 mg by mouth Daily., Disp: , Rfl:   •  ondansetron (ZOFRAN) 8 MG tablet, TAKE ONE TABLET BY MOUTH EVERY 8 HOURS AS NEEDED FOR NAUSEA AND VOMITING, Disp: 20 tablet, Rfl: 1  •  pantoprazole (PROTONIX) 20 MG EC tablet, Take 20 mg by mouth Daily., Disp: , Rfl:   •  potassium chloride " (K-DUR,KLOR-CON) 10 MEQ CR tablet, Take 1 tablet by mouth Daily., Disp: 30 tablet, Rfl: 3  •  promethazine (PHENERGAN) 25 MG tablet, TAKE ONE TABLET BY MOUTH EVERY 4 TO 6 HOURS AS NEEDED FOR NAUSEA AND VOMITING, Disp: 20 tablet, Rfl: 2  •  vitamin C (ASCORBIC ACID) 500 MG tablet, Take 1,000 mg by mouth Daily., Disp: , Rfl:   •  XARELTO 20 MG tablet, TAKE ONE TABLET BY MOUTH DAILY, Disp: 30 tablet, Rfl: 3  •  Zinc Gluconate 50 MG capsule, Take  by mouth., Disp: , Rfl:     No Known Allergies    Family History   Problem Relation Age of Onset   • Hypertension Mother        Cancer-related family history is not on file.    Social History     Tobacco Use   • Smoking status: Never Smoker   • Smokeless tobacco: Never Used   Substance Use Topics   • Alcohol use: No     Frequency: Never   • Drug use: No       I have reviewed the history of present illness, past medical history, family history, social history, lab results, all notes and other records since the patient was last seen on 8/6/2019    Subjective     Patient is my office for follow-up accompanied by her friend.  Swelling in the left elbow is growing.  Has fluid overload in her lower extremities also.  Reports that she is eating well.  Tolerated the EP and Opdivo chemotherapy.    No further hematuria and she is taking the Eliquis.        ROS:       Review of Systems   Constitutional: Negative for fever.   HENT: Negative for nosebleeds and trouble swallowing.    Eyes: Negative for visual disturbance.   Respiratory: Negative for cough, shortness of breath and wheezing.    Cardiovascular: Negative for chest pain.   Gastrointestinal: Negative for abdominal pain and blood in stool.   Endocrine: Negative for cold intolerance.   Genitourinary: Negative for dysuria and hematuria.   Musculoskeletal: Negative for joint swelling.   Skin: Negative for rash.   Allergic/Immunologic: Negative for environmental allergies.   Neurological: Negative for seizures.   Hematological: Does  "not bruise/bleed easily.   Psychiatric/Behavioral: The patient is not nervous/anxious.          Objective:       Vitals:    08/27/19 1325   BP: 165/84   Pulse: 79   Resp: 18   Temp: 97.9 °F (36.6 °C)   Weight: 78.1 kg (172 lb 3.2 oz)   Height: 170.2 cm (67\")   PainSc:   9   PainLoc: Elbow  Comment: from the radiation         Physical Exam   Constitutional: She is oriented to person, place, and time. No distress.   HENT:   Head: Normocephalic and atraumatic.   Eyes: Conjunctivae and EOM are normal. Right eye exhibits no discharge. Left eye exhibits no discharge. No scleral icterus.   Neck: Trachea normal and normal range of motion. Neck supple. No thyromegaly present.   Cardiovascular: Normal rate, regular rhythm, S1 normal, S2 normal and normal heart sounds. Exam reveals no gallop and no friction rub.   Pulmonary/Chest: Effort normal and breath sounds normal. No stridor. No respiratory distress. She has no wheezes.   Abdominal: Soft. Bowel sounds are normal. She exhibits no mass. There is no hepatosplenomegaly. There is no tenderness. There is no rebound and no guarding.   Musculoskeletal: Normal range of motion. She exhibits no tenderness.   Left upper extremity swollen, swelling and tenderness of the left elbow, pulses palpable       Lymphadenopathy:     She has no cervical adenopathy.   Neurological: She is alert and oriented to person, place, and time. She has normal strength. She exhibits normal muscle tone.   Skin: Skin is warm and dry. No rash noted. She is not diaphoretic. No erythema.   Psychiatric: She has a normal mood and affect. Her speech is normal and behavior is normal.   Nursing note and vitals reviewed.    Left upper extremity swelling  RECENT LABS:     WBC   Date Value Ref Range Status   08/27/2019 6.45 3.40 - 10.80 10*3/mm3 Final     RBC   Date Value Ref Range Status   08/27/2019 4.06 3.77 - 5.28 10*6/mm3 Final     Hemoglobin   Date Value Ref Range Status   08/27/2019 11.1 (L) 12.0 - 15.9 g/dL " Final     Hematocrit   Date Value Ref Range Status   08/27/2019 36.5 34.0 - 46.6 % Final     MCV   Date Value Ref Range Status   08/27/2019 89.9 79.0 - 97.0 fL Final     MCH   Date Value Ref Range Status   08/27/2019 27.3 26.6 - 33.0 pg Final     MCHC   Date Value Ref Range Status   08/27/2019 30.4 (L) 31.5 - 35.7 g/dL Final     RDW   Date Value Ref Range Status   08/27/2019 21.6 (H) 12.3 - 15.4 % Final     RDW-SD   Date Value Ref Range Status   08/27/2019 69.3 (H) 37.0 - 54.0 fl Final     MPV   Date Value Ref Range Status   08/27/2019 9.0 6.0 - 12.0 fL Final     Platelets   Date Value Ref Range Status   08/27/2019 245 140 - 450 10*3/mm3 Final     Neutrophil %   Date Value Ref Range Status   08/27/2019 73.0 42.7 - 76.0 % Final     Lymphocyte %   Date Value Ref Range Status   08/27/2019 11.6 (L) 19.6 - 45.3 % Final     Monocyte %   Date Value Ref Range Status   08/27/2019 11.5 5.0 - 12.0 % Final     Eosinophil %   Date Value Ref Range Status   08/27/2019 3.4 0.3 - 6.2 % Final     Basophil %   Date Value Ref Range Status   08/27/2019 0.5 0.0 - 1.5 % Final     Neutrophils, Absolute   Date Value Ref Range Status   08/27/2019 4.71 1.70 - 7.00 10*3/mm3 Final     Lymphocytes, Absolute   Date Value Ref Range Status   08/27/2019 0.75 0.70 - 3.10 10*3/mm3 Final     Monocytes, Absolute   Date Value Ref Range Status   08/27/2019 0.74 0.10 - 0.90 10*3/mm3 Final     Eosinophils, Absolute   Date Value Ref Range Status   08/27/2019 0.22 0.00 - 0.40 10*3/mm3 Final     Basophils, Absolute   Date Value Ref Range Status   08/27/2019 0.03 0.00 - 0.20 10*3/mm3 Final     nRBC   Date Value Ref Range Status   05/22/2019 0 0 /100[WBCs] Final       Lab Results   Component Value Date    GLUCOSE 98 08/19/2019    BUN 14 08/19/2019    CREATININE 1.30 (H) 08/19/2019    EGFRIFNONA 40 (L) 08/19/2019    EGFRIFAFRI 34 (L) 05/31/2019    BCR 10.8 08/19/2019    K 3.7 08/19/2019    CO2 21.0 (L) 08/19/2019    CALCIUM 8.8 (L) 08/19/2019    ALBUMIN 3.00 (L)  08/19/2019    LABIL2 1.1 06/10/2019    AST 21 08/19/2019    ALT 11 (L) 08/19/2019         Assessment/Plan      ASSESSMENT:   1. Metastatic renal cell carcinoma  2. Left upper extremity unprovoked DVT  3. Bone metastatic disease  4. Destructive bone lesion in the left distal humerus and proximal ulna  5. Gross hematuria  6. Type 2 diabetes  7. Iron deficiency  8. Hypocalcemia  9. ECOG 2      PLAN:      1. Patient is currently on chemotherapy with EP and Opdivo.  Received 1 cycle tolerated relatively well will continue it at this time.    2. Patient is on Xarelto it was restarted by Dr. Galo after the hematuria was resolved   3. Continue the fentanyl patch and Lortab as needed  4. Continue with Xgeva.  Patient will be continued on calcium replacement with Tums  5. I will set up appointment for her to see Dr. Concepcion now that she has completed radiation therapy.  I met a lymphedema sleeve will be ordered for her left upper extremity for the swelling.  6. Diabetic foot ulcer obliquely healed up.  7. Hemoglobin is low related to chemotherapy CRF 3 and hematuria.,  Continue Procrit per protocol restart today.    8. Continue calcium replacement with Tums  9. I will see her back in my office in 3 weeks recheck CBC CMP at that time.      I have reviewed labs results, imaging, vitals, and medications with the patient and her friend  today.      Patient and her friend verbalized understanding and is in agreement of the above plans.     This report was compiled using Dragon voice recognition software. I have made every effort to proof read this document; however, typographical errors may persist

## 2019-09-05 NOTE — PROGRESS NOTES
FOLLOW-UP NOTE    Name: Caroline Truong  YOB: 1945  MRN #: [id number]  Date of Service: 9/5/2019  Referring Provider: Mercedez Dallas MD  [address] [facility]  [address]  [place] [address]  Primary Care Provider: Mercedez Dallas MD    DIAGNOSIS:   1. Bone metastasis (CMS/HCC)    Metastatic Renal Cell Carcinoma    REASON FOR VISIT: Routine scheduled follow-up for one month assessment s/p palliative XRT    RADIATION TREATMENT COURSE: Left upper extremity, 30 Gy in 10 fractions, completed 08/07/19.    HISTORY OF PRESENT ILLNESS: The patient is a 74 y.o. year old female who is following with Dr. Fenton for her systemic therapy and was found to have a left upper extremity blooed clot and then symptomatic and destructive bone lesion in the distal humerus on the left as well as the proximal ulna consistent with metastatic disease.  She was recommended palliative XRT, discussed with Ortho and has been followed by them since treatment.  She was switched to nivolumab and ipilimumab 08/19/19 after XRT completed.    Pain and motion remain an issue.  Patient will benefit from home health as will her care providers to assist with needs, lifting and formal evaluation.  She has no worsening of symptoms.  The swelling the in extremity fluctuates as it did before treatment.        The following portions of the patient's history were reviewed and updated as appropriate: allergies, current medications, past family history, past medical history, past social history, past surgical history and problem list. Reviewed with the patient and remain unchanged.    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  MEDICATIONS:   Current Outpatient Medications:   •  ACCU-CHEK FASTCLIX LANCETS misc, ACCU-CHEK FASTCLIX LANCETS, Disp: , Rfl:   •  Blood Glucose Monitoring Suppl (ACCU-CHEK JESSIE PLUS) w/Device kit, ACCU-CHEK JESSIE PLUS w/Device KIT, Disp: , Rfl:   •  calcium carbonate (TUMS) 500 MG chewable tablet, Chew 500 mg 2 (Two) Times a Day., Disp: 60 tablet, Rfl: 3  •  carvedilol (COREG) 3.125 MG tablet, Every 12 (Twelve) Hours., Disp: , Rfl:   •  fenofibrate 160 MG tablet, Take 160 mg by mouth Daily., Disp: , Rfl:   •  fentaNYL (DURAGESIC) 100 MCG/HR patch, Place 1 patch on the skin as directed by provider Every 72 (Seventy-Two) Hours., Disp: 10 patch, Rfl: 0  •  glucose blood (ACCU-CHEK JESSIE PLUS) test strip, ACCU-CHEK JESSIE PLUS STRP, Disp: , Rfl:   •  glucose blood (ACCU-CHEK JESSIE) test strip, 1 each by Other route Daily. [id number], Disp: 100 each, Rfl: 12  •  hydrALAZINE (APRESOLINE) 50 MG tablet, Every 12 (Twelve) Hours., Disp: , Rfl:   •  Insulin Glargine (LANTUS SOLOSTAR) 100 UNIT/ML injection pen, Inject 7 Units under the skin into the appropriate area as directed Daily., Disp: 15 pen, Rfl: 0  •  Insulin Pen Needle (PEN NEEDLES 3/16\") 31G X 5 MM misc, PEN NEEDLES 3/16\" 31G X 5 MM, Disp: , Rfl:   •  K Phos Bath-Sod Phos Di & Bath (PHOSPHOROUS PO), Take  by mouth., Disp: , Rfl:   •  Lancets Misc. (ACCU-CHEK SOFTCLIX LANCET DEV) kit, ACCU-CHEK SOFTCLIX LANCET DEV KIT, Disp: , Rfl:   •  magnesium oxide (MAG-OX) 400 MG tablet, Take 400 mg by mouth Daily., Disp: , Rfl:   •  pantoprazole (PROTONIX) 20 MG EC tablet, Take " "20 mg by mouth Daily., Disp: , Rfl:   •  potassium chloride (K-DUR,KLOR-CON) 10 MEQ CR tablet, Take 1 tablet by mouth Daily., Disp: 30 tablet, Rfl: 3  •  vitamin C (ASCORBIC ACID) 500 MG tablet, Take 1,000 mg by mouth Daily., Disp: , Rfl:   •  XARELTO 20 MG tablet, TAKE ONE TABLET BY MOUTH DAILY, Disp: 30 tablet, Rfl: 3  •  Zinc Gluconate 50 MG capsule, Take  by mouth., Disp: , Rfl:   •  Cyanocobalamin (B-12 COMPLIANCE INJECTION) 1000 MCG/ML kit, Inject 1 mL as directed Every 30 (Thirty) Days., Disp: , Rfl:   •  ondansetron (ZOFRAN) 8 MG tablet, TAKE ONE TABLET BY MOUTH EVERY 8 HOURS AS NEEDED FOR NAUSEA AND VOMITING, Disp: 20 tablet, Rfl: 1  •  promethazine (PHENERGAN) 25 MG tablet, TAKE ONE TABLET BY MOUTH EVERY 4 TO 6 HOURS AS NEEDED FOR NAUSEA AND VOMITING, Disp: 20 tablet, Rfl: 2  ALLERGIES: No Known Allergies  PAST SURGICAL HISTORY: she has a past surgical history that includes Total abdominal hysterectomy (); Pacemaker Implantation (2014); Cardiac catheterization (2017); and Pacemaker Implantation.  PREVIOUS RADIOTHERAPY OR CHEMOTHERAPY:Yes  FAMILY HISTORY: her family history includes Hypertension in her mother.  SOCIAL HISTORY: she  reports that she has never smoked. She has never used smokeless tobacco. She reports that she does not drink alcohol or use drugs.  PAIN AND PAIN MANAGEMENT:   Vitals:    19 1120   BP: 162/69   Pulse: 61   Temp: 98.1 °F (36.7 °C)   TempSrc: Oral   SpO2: 91%   Weight: 75.5 kg (166 lb 6.4 oz)   Height: 170.2 cm (67\")   PainSc:   6   PainLoc: Elbow  Comment: left     NUTRITIONAL STATUS:    Otherwise no issues  KPS: 60  ECO    Review of Systems:   Review of Systems   Respiratory: Positive for shortness of breath.         With exertion   Cardiovascular: Positive for leg swelling.        Elevating as much as she can   Genitourinary: Positive for hematuria.        Just a little blood but no clots.    Psychiatric/Behavioral: Positive for sleep disturbance.       " " Having trouble going to sleep and staying asleep      Patient states pain in elbow 6/10.   Patient is on chemo 1 time every 3 weeks.      Otherwise negative as below.     General: No fevers, chills, weight change, or drenching night sweats. Skin: No rashes or jaundice.  HEENT: No change in vision or hearing, no headaches.  Neck: No dysphagia or masses.  Heme/Lymph: No easy bruising or bleeding.  GI: No nausea, vomiting, diarrhea, melena, or hematochezia.   Endocrine: No heat or cold intolerance. Musculoskeletal: As noted above.  Neuro: No weakness, numbness, syncope, or seizures. Psych: No mood changes or depression. Ext: Denies swelling.        Objective     Vitals:  Vitals:    09/05/19 1120   BP: 162/69   Pulse: 61   Temp: 98.1 °F (36.7 °C)   TempSrc: Oral   SpO2: 91%   Weight: 75.5 kg (166 lb 6.4 oz)   Height: 170.2 cm (67\")   PainSc:   6   PainLoc: Elbow  Comment: left       PHYSICAL EXAM:  GENERAL: in no apparent distress, sitting comfortably in room.    CARDIOVASCULAR: S1 & S2 detected; no murmurs, rubs or gallops.  CHEST: clear to auscultation bilaterally; no wheezes, crackles or rubs. Work of breathing normal.  ABDOMEN: bowel sounds present. Abdomen is soft, nontender, nondistended.   MUSCULOSKELETAL: no tenderness to palpation along the spine or scapulae. Normal range of motion. Brace on left arm per ortho has helped.  Limited change in swelling or range of motion from palliative XRT benefit.  Pain is improved but still considerable.  EXTREMITIES: no clubbing, cyanosis, + edema FAMILIA.  Wearing a brace which has helped.  NEUROLOGIC: cranial nerves II-XII grossly intact bilaterally. No focal neurologic deficits.  PSYCHIATRIC:  alert, aware, and appropriate.    PERTINENT IMAGING/PATHOLOGY/LABS (Medical Decision Making):     COORDINATION OF CARE: A copy of this note is sent to the referring provider.    PATHOLOGY (Reviewed):     IMAGING (Reviewed):     LABS (Reviewed):  Hematology WBC   Date Value Ref Range " Status   09/03/2019 7.79 3.40 - 10.80 10*3/mm3 Final     RBC   Date Value Ref Range Status   09/03/2019 4.11 3.77 - 5.28 10*6/mm3 Final     Hemoglobin   Date Value Ref Range Status   09/03/2019 11.0 (L) 12.0 - 15.9 g/dL Final     Hematocrit   Date Value Ref Range Status   09/03/2019 36.6 34.0 - 46.6 % Final     Platelets   Date Value Ref Range Status   09/03/2019 243 140 - 450 10*3/mm3 Final      Chemistry   Lab Results   Component Value Date    GLUCOSE 101 (H) 08/27/2019    BUN 13 08/27/2019    CREATININE 1.30 (H) 08/27/2019    EGFRIFNONA 40 (L) 08/27/2019    EGFRIFAFRI 34 (L) 05/31/2019    BCR 10.0 08/27/2019    K 4.2 08/27/2019    CO2 22.0 08/27/2019    CALCIUM 9.3 08/27/2019    ALBUMIN 3.10 (L) 08/27/2019    LABIL2 1.1 06/10/2019    AST 25 08/27/2019    ALT 8 (L) 08/27/2019         Assessment/Plan     ASSESSMENT AND PLAN:    1. Bone metastasis (CMS/HCC)     metastatic RCC  -switched systemic therapy as noted above and tolerating new regimen.  -Saw ortho with brace and will benefit from home health eval as noted in HPI portion of the note. I have ordered.  -FU here in 3 months or earlier as needed.  -Ortho is following.  Pain has improved but still issue; working with Dr. Fenton's team on this.    This assessment comes from my review of the imaging, pathology, physician notes and other pertinent information as mentioned.    CC: MD Herbert Fisher MD  9/5/2019  1700    Encounter    Approved by:     Herbert Crump MD  09/19/19  4:57 PM

## 2019-09-09 NOTE — PROGRESS NOTES
Case Management/ Note    Patient Name: Caroline Truong  YOB: 1945  MRN #: [id number]    OSW met briefly with patient who is pleasant, alert and oriented to person, place and time. She states she went to the orthopedic doctor who said her left arm would break at anytime, currently she has a brace on her arm. She hopes this won't be too painful. She states she is in good spirits and still able to do things that she enjoys. No other noted concerns.     Electronically signed by:   Luz Maria Ulrich LCSW, OSW-C  09/09/19, 4:30 PM

## 2019-09-17 NOTE — PROGRESS NOTES
Hematology/Oncology Outpatient Follow Up    Caroline Truong  1945    Primary Care Physician: Mercedez Dallas MD  Referring Physician: Mercedez Dallas*  Chief complaint:  1. Metastatic renal cell carcinoma.   2. Bone mets.   3. Left upper extremity unprovoked DVT.       History of Present Illness:     Ms. Truong does not have a personal or family history of blood   clotting disorder.  She reports about four weeks prior to presentation, she noticed swelling in   her left arm.  Slowly it was getting bigger and pain started, and she sought medical attention.    · 11/16/18 - Doppler ultrasound of the left upper extremity was obtained which revealed acute DVT   involving the left subclavian vein, axillary vein and brachial vein.  Superficial venous   thrombosis involving the left basilic vein.    · 11/30/18 - VQ scan was obtained, which was low probability of PE.    · Patient was sent to the [facility] and seen initially on 12/5/18.  · 12/11/18 - CT scan of the chest, abdomen and pelvis done without contrast secondary to renal   insufficiency showed multiple small pulmonary nodules.  Largest measuring 5 mm within the right   lower lobe, indeterminate, but may represent small pulmonary metastasis.  Follow up recommended.    Enlarged AP window lymph node 1.4 x 2.8 cm.  Pathological fractures involving the left 1st rib   and right 7th rib with underlying lytic lesions suspicious for osseous metastatic disease or   myeloma.       Exophytic lobular intermediate density lesion arising from the lower pole of the right kidney,   suspicious for a primary neoplasm.  Evaluation limited due to lack of IV contrast.  Lytic lesions   involving the posteromedial right acetabular wall and left iliac vein suspicious for osseous   metastatic disease or myeloma.     · 12/21/18 - Wagoner-lambda free light chain ratio 0.98.  Serum electrophoresis normal.  Serum   immunofixation normal.  Urine  immunofixation normal.  B12 495.  Beta 2 microglobulin 1.36.    Immunoglobulin G and M normal.  Haptoglobin 157 normal.  Ferritin 138.  Folic acid 13.1.    Creatinine 1.5.  Retic count 1.26.  Hemoccult negative x3.    · Patient was continued on indefinite Xarelto for her DVT.   2.   Oncologic History:    · 1/11/19 - Bone scan showed uptake within the posterior left 1st rib, posterior right 7th rib, L1   vertebral body and left iliac crest consistent with known lytic lesions seen on CT of 12/11/18.    Consistent with metastatic disease.    · 1/24/19 - CT-guided biopsy of the left iliac crest positive for metastatic clear cell renal cell   carcinoma.    · 2/4/19 - PET/CT scan showed hypermetabolic 2.3 x 4.6 cm right renal mass most consistent with   renal cell carcinoma.  An adjacent 12 mm right for renal soft tissue nodule consistent with   metastatic nodule.  Right retroperitoneal hypermetabolic lymphadenopathy.  Multiple small   pulmonary nodules worrisome for hematogenous pulmonary metastases.  Multiple lytic metastases in   the chest, abdomen and pelvis which have developed or enlarged since December 2018.   · 2/18/19 - Patient started Votrient 800 mg to take once a day.   · 3/4/19 - CT scan of the chest with PE protocol.  No evidence of PE.  Worsening of metastatic   disease, but no definite new lesions.  No fractures.  Stable tiny bilateral pulmonary nodules.    · 3/11/19 - Patient’s chemotherapy was switched to weekly Torisel at 25 mg (Votrient was stopped   secondary to tumor pain).   · 5/28/19 - PET/CT scan showed overall appears to be improvement from prior exam, specifically   multiple pulmonary nodules have improved have prior study and mediastinal lymphadenopathy has   improved.  There appears to be interval central necrosis involving an osseous metastasis along   the left iliac vein.  4.1 cm right renal mass demonstrates suggestion of some central necrosis.    A previously described lymph node anterior  to the psoas muscle is no longer well visualized.  A   right retroperitoneal FDG avid lymph node and an FDG avid soft tissue nodule in the right   pararenal space appears stable.    7/23/2019 - CT of upper extremity left without contrast  -1. Destructive lesion present within the distal humerus as well as the  proximal ulna consistent with metastatic disease.  2. Osseous metastasis within the bilateral ribs and spine as above.  3. Small bilateral pleural effusions with presence of cardiomegaly.  Patchy airspace disease is present within the lung bases bilaterally  which may be partially related to atelectasis and/or pneumonia.  Evaluation limited due to respiratory motion.     7/28/2019 patient was initiated on external beam radiation therapy for palliative measures to control pain on her left elbow where there was destructive bone lesion in the distal humerus as well as proximal ulna consistent with metastatic disease.  30 Gy in 10 fractions  8/19/2019 secondary to progression of disease chemotherapy was switched to nivolumab and ipilimumab    Past Medical History:   Diagnosis Date   • Abnormal laboratory test 12/07/2018   • Anemia 12/11/2018   • Atrioventricular block, complete (CMS/HCC) 08/29/2014   • Bilateral leg edema 09/09/2014   • Bradycardia 08/29/2014   • Chest discomfort 08/24/2014   • Clear cell carcinoma of kidney (CMS/HCC) 01/28/2019   • Deep venous thrombosis of arm (CMS/HCC) 11/19/2018   • Diabetes mellitus (CMS/HCC) 10/22/2018   • Dyspnea 10/15/2018   • Dyspnea 08/29/2014   • Fatigue 10/15/2018   • Hypertension 08/29/2014   • Localized swelling, left upper limb 11/16/2018   • Osteopenia 10/30/2018   • Overweight 10/15/2018   • Pacemaker 09/30/2014    permanent   • Postmenopausal status 10/15/2018   • Preop examination 11/30/2018   • Renal insufficiency 12/11/2018       Past Surgical History:   Procedure Laterality Date   • CARDIAC CATHETERIZATION  03/14/2017   • PACEMAKER IMPLANTATION  09/18/2014     "Dual chamber- BS   • PACEMAKER IMPLANTATION     • TOTAL ABDOMINAL HYSTERECTOMY  2000         Current Outpatient Medications:   •  ACCU-CHEK FASTCLIX LANCETS misc, ACCU-CHEK FASTCLIX LANCETS, Disp: , Rfl:   •  Blood Glucose Monitoring Suppl (ACCU-CHEK JESSIE PLUS) w/Device kit, ACCU-CHEK JESSIE PLUS w/Device KIT, Disp: , Rfl:   •  calcium carbonate (TUMS) 500 MG chewable tablet, Chew 500 mg 2 (Two) Times a Day., Disp: 60 tablet, Rfl: 3  •  carvedilol (COREG) 3.125 MG tablet, Every 12 (Twelve) Hours., Disp: , Rfl:   •  Cyanocobalamin (B-12 COMPLIANCE INJECTION) 1000 MCG/ML kit, Inject 1 mL as directed Every 30 (Thirty) Days., Disp: , Rfl:   •  fenofibrate 160 MG tablet, Take 160 mg by mouth Daily., Disp: , Rfl:   •  fentaNYL (DURAGESIC) 100 MCG/HR patch, Place 1 patch on the skin as directed by provider Every 72 (Seventy-Two) Hours., Disp: 10 patch, Rfl: 0  •  glucose blood (ACCU-CHEK JESSEI PLUS) test strip, ACCU-CHEK JESSIE PLUS STRP, Disp: , Rfl:   •  hydrALAZINE (APRESOLINE) 50 MG tablet, Every 12 (Twelve) Hours., Disp: , Rfl:   •  Insulin Glargine (LANTUS SOLOSTAR) 100 UNIT/ML injection pen, Inject 7 Units under the skin into the appropriate area as directed Daily., Disp: 15 pen, Rfl: 0  •  Insulin Pen Needle (PEN NEEDLES 3/16\") 31G X 5 MM misc, PEN NEEDLES 3/16\" 31G X 5 MM, Disp: , Rfl:   •  K Phos Nolan-Sod Phos Di & Nolan (PHOSPHOROUS PO), Take  by mouth., Disp: , Rfl:   •  Lancets Misc. (ACCU-CHEK SOFTCLIX LANCET DEV) kit, ACCU-CHEK SOFTCLIX LANCET DEV KIT, Disp: , Rfl:   •  lidocaine-prilocaine (EMLA) 2.5-2.5 % cream, APPLY TO AFFECTED AREA(S) AROUND PORT 30-60 MINUTES PRIOR TO PORT USE, Disp: 1 each, Rfl: 2  •  magnesium oxide (MAG-OX) 400 MG tablet, Take 400 mg by mouth Daily., Disp: , Rfl:   •  ondansetron (ZOFRAN) 8 MG tablet, TAKE ONE TABLET BY MOUTH EVERY 8 HOURS AS NEEDED FOR FOR NAUSEA AND VOMITING, Disp: 20 tablet, Rfl: 2  •  pantoprazole (PROTONIX) 20 MG EC tablet, Take 20 mg by mouth Daily., Disp: , " Rfl:   •  potassium chloride (K-DUR,KLOR-CON) 10 MEQ CR tablet, Take 1 tablet by mouth Daily., Disp: 30 tablet, Rfl: 3  •  promethazine (PHENERGAN) 25 MG tablet, TAKE ONE TABLET BY MOUTH EVERY 4 TO 6 HOURS AS NEEDED FOR NAUSEA AND VOMITING, Disp: 20 tablet, Rfl: 2  •  vitamin C (ASCORBIC ACID) 500 MG tablet, Take 1,000 mg by mouth Daily., Disp: , Rfl:   •  XARELTO 20 MG tablet, TAKE ONE TABLET BY MOUTH DAILY, Disp: 30 tablet, Rfl: 3  •  Zinc Gluconate 50 MG capsule, Take  by mouth., Disp: , Rfl:     No Known Allergies    Family History   Problem Relation Age of Onset   • Hypertension Mother        Cancer-related family history is not on file.    Social History     Tobacco Use   • Smoking status: Never Smoker   • Smokeless tobacco: Never Used   Substance Use Topics   • Alcohol use: No     Frequency: Never   • Drug use: No       I have reviewed the history of present illness, past medical history, family history, social history, lab results, all notes and other records since the patient was last seen on 8/27/2019    Subjective     Patient is my office for follow-up accompanied by her friend.  She is using a brace now for the left swelling in the left especially the elbow has decreased.  Patient reports the pain is controlled now.  Tolerating the Ipi and Opdivo well.  No diarrhea, no confusion    ROS:       Review of Systems   Constitutional: Negative for fever.   HENT: Negative for nosebleeds and trouble swallowing.    Eyes: Negative for visual disturbance.   Respiratory: Negative for cough, shortness of breath and wheezing.    Cardiovascular: Negative for chest pain.   Gastrointestinal: Negative for abdominal pain and blood in stool.   Endocrine: Negative for cold intolerance.   Genitourinary: Negative for dysuria and hematuria.   Musculoskeletal: Negative for joint swelling.   Skin: Negative for rash.   Allergic/Immunologic: Negative for environmental allergies.   Neurological: Negative for seizures.  "  Hematological: Does not bruise/bleed easily.   Psychiatric/Behavioral: The patient is not nervous/anxious.          Objective:       Vitals:    09/17/19 1330   BP: 171/79   Pulse: 60   Resp: 18   Temp: 97.9 °F (36.6 °C)   Weight: 70.3 kg (155 lb)   Height: 170.2 cm (67\")   PainSc:   1   PainLoc: Elbow  Comment: left elbow pain         Physical Exam   Constitutional: She is oriented to person, place, and time. No distress.   HENT:   Head: Normocephalic and atraumatic.   Eyes: Conjunctivae and EOM are normal. Right eye exhibits no discharge. Left eye exhibits no discharge. No scleral icterus.   Neck: Trachea normal and normal range of motion. Neck supple. No thyromegaly present.   Cardiovascular: Normal rate, regular rhythm, S1 normal, S2 normal and normal heart sounds. Exam reveals no gallop and no friction rub.   Pulmonary/Chest: Effort normal and breath sounds normal. No stridor. No respiratory distress. She has no wheezes.   Abdominal: Soft. Bowel sounds are normal. She exhibits no mass. There is no hepatosplenomegaly. There is no tenderness. There is no rebound and no guarding.   Musculoskeletal: Normal range of motion. She exhibits no tenderness.   Left upper extremity swollen, swelling and tenderness of the left elbow, pulses palpable       Lymphadenopathy:     She has no cervical adenopathy.   Neurological: She is alert and oriented to person, place, and time. She has normal strength. She exhibits normal muscle tone.   Skin: Skin is warm and dry. No rash noted. She is not diaphoretic. No erythema.   Psychiatric: She has a normal mood and affect. Her speech is normal and behavior is normal.   Nursing note and vitals reviewed.    Left upper extremity swelling  RECENT LABS:     WBC   Date Value Ref Range Status   09/17/2019 6.74 3.40 - 10.80 10*3/mm3 Final     RBC   Date Value Ref Range Status   09/17/2019 4.13 3.77 - 5.28 10*6/mm3 Final     Hemoglobin   Date Value Ref Range Status   09/17/2019 11.3 (L) 12.0 - " 15.9 g/dL Final     Hematocrit   Date Value Ref Range Status   09/17/2019 36.8 34.0 - 46.6 % Final     MCV   Date Value Ref Range Status   09/17/2019 89.1 79.0 - 97.0 fL Final     MCH   Date Value Ref Range Status   09/17/2019 27.4 26.6 - 33.0 pg Final     MCHC   Date Value Ref Range Status   09/17/2019 30.7 (L) 31.5 - 35.7 g/dL Final     RDW   Date Value Ref Range Status   09/17/2019 17.4 (H) 12.3 - 15.4 % Final     RDW-SD   Date Value Ref Range Status   09/17/2019 55.6 (H) 37.0 - 54.0 fl Final     MPV   Date Value Ref Range Status   09/17/2019 9.0 6.0 - 12.0 fL Final     Platelets   Date Value Ref Range Status   09/17/2019 243 140 - 450 10*3/mm3 Final     Neutrophil %   Date Value Ref Range Status   09/17/2019 70.5 42.7 - 76.0 % Final     Lymphocyte %   Date Value Ref Range Status   09/17/2019 13.5 (L) 19.6 - 45.3 % Final     Monocyte %   Date Value Ref Range Status   09/17/2019 11.7 5.0 - 12.0 % Final     Eosinophil %   Date Value Ref Range Status   09/17/2019 3.4 0.3 - 6.2 % Final     Basophil %   Date Value Ref Range Status   09/17/2019 0.9 0.0 - 1.5 % Final     Neutrophils, Absolute   Date Value Ref Range Status   09/17/2019 4.75 1.70 - 7.00 10*3/mm3 Final     Lymphocytes, Absolute   Date Value Ref Range Status   09/17/2019 0.91 0.70 - 3.10 10*3/mm3 Final     Monocytes, Absolute   Date Value Ref Range Status   09/17/2019 0.79 0.10 - 0.90 10*3/mm3 Final     Eosinophils, Absolute   Date Value Ref Range Status   09/17/2019 0.23 0.00 - 0.40 10*3/mm3 Final     Basophils, Absolute   Date Value Ref Range Status   09/17/2019 0.06 0.00 - 0.20 10*3/mm3 Final     nRBC   Date Value Ref Range Status   05/22/2019 0 0 /100[WBCs] Final       Lab Results   Component Value Date    GLUCOSE 101 (H) 08/27/2019    BUN 13 08/27/2019    CREATININE 1.30 (H) 08/27/2019    EGFRIFNONA 40 (L) 08/27/2019    EGFRIFAFRI 34 (L) 05/31/2019    BCR 10.0 08/27/2019    K 4.2 08/27/2019    CO2 22.0 08/27/2019    CALCIUM 9.3 08/27/2019    ALBUMIN  3.10 (L) 08/27/2019    LABIL2 1.1 06/10/2019    AST 25 08/27/2019    ALT 8 (L) 08/27/2019         Assessment/Plan      ASSESSMENT:   1. Metastatic renal cell carcinoma  2. Left upper extremity unprovoked DVT  3. Bone metastatic disease  4. Destructive bone lesion in the left distal humerus and proximal ulna  5. Type 2 diabetes  6. ECOG 1      PLAN:      1. Patient is currently on chemotherapy with Ipi and Opdivo.  Received 2 cycles so far tolerated relatively well will continue it at this time.  Continue Opdivo and Ipi for total 4 cycles followed by single agent Opdivo.  2. Patient is on Xarelto it was restarted by Dr. Galo after the hematuria was resolved   3. Continue the fentanyl patch and Lortab as needed  4. Continue with Xgeva.  Patient will be continued on calcium replacement with Tums  5. Patient has a sleeve for her left elbow mass that is helping her with the pain continue to use it she will follow with Dr. Concepcion  6. Diabetic foot ulcer almost healed up.  7. Hemoglobin is low related to chemotherapy CRF 3 and hematuria.,  Continue Procrit per protocol restart today.    8. Continue calcium replacement with Tums  9. I will see her back in my office in 3 weeks recheck CBC CMP at that time.      I have reviewed labs results, imaging, vitals, and medications with the patient and her friend  today.      Patient and her friend verbalized understanding and is in agreement of the above plans.     This report was compiled using Dragon voice recognition software. I have made every effort to proof read this document; however, typographical errors may persist

## 2019-09-25 NOTE — TELEPHONE ENCOUNTER
Received voicemail from Katharine with [facility]. Katharine stated patient has declined home health services. Dr. Crump and Dr. Fenton notified (VM left with Loren, Dr. Fenton's assistant).

## 2019-10-15 NOTE — PROGRESS NOTES
Hematology/Oncology Outpatient Follow Up    Caroline Truong  1945    Primary Care Physician: Mercedez Dallas MD  Referring Physician: Mercedez Dallas*  Chief complaint:   Metastatic renal cell carcinoma.   Bone mets.    Left upper extremity unprovoked DVT.     History of Present Illness:   · Ms. Truong does not have a personal or family history of blood clotting disorder.  She reports about four weeks prior to presentation, she noticed swelling in her left arm. Slowly it was getting bigger and pain started, and she sought medical attention.    · 11/16/18 - Doppler ultrasound of the left upper extremity was obtained which revealed acute DVT involving the left subclavian vein, axillary vein and brachial vein.  Superficial venous thrombosis involving the left basilic vein.    · 11/30/18 - VQ scan was obtained, which was low probability of PE.    · Patient was sent to the [facility] and seen initially on 12/5/18.  · 12/11/18 - CT scan of the chest, abdomen and pelvis done without contrast secondary to renal insufficiency showed multiple small pulmonary nodules.  Largest measuring 5 mm within the right lower lobe, indeterminate, but may represent small pulmonary metastasis.  Follow up recommended.  Enlarged AP window lymph node 1.4 x 2.8 cm.  Pathological fractures involving the left 1st rib and right 7th rib with underlying lytic lesions suspicious for osseous metastatic disease or myeloma.     · Exophytic lobular intermediate density lesion arising from the lower pole of the right kidney, suspicious for a primary neoplasm.  Evaluation limited due to lack of IV contrast.  Lytic lesions involving the posteromedial right acetabular wall and left iliac vein suspicious for osseous metastatic disease or myeloma.     · 12/21/18 - [place]-lambda free light chain ratio 0.98.  Serum electrophoresis normal. Serum immunofixation normal.  Urine immunofixation normal.  B12 495.  Beta 2  microglobulin 1.36.  Immunoglobulin G and M normal.  Haptoglobin 157 normal.  Ferritin 138.  Folic acid 13.1.  Creatinine 1.5.  Retic count 1.26.  Hemoccult negative x3.    · Patient was continued on indefinite Xarelto for her DVT.   2.   Oncologic History:    · 1/11/19 - Bone scan showed uptake within the posterior left 1st rib, posterior right 7th rib, L1 vertebral body and left iliac crest consistent with known lytic lesions seen on CT of 12/11/18 Consistent with metastatic disease.    · 1/24/19 - CT-guided biopsy of the left iliac crest positive for metastatic clear cell renal cell carcinoma.    · 2/4/19 - PET/CT scan showed hypermetabolic 2.3 x 4.6 cm right renal mass most consistent with renal cell carcinoma.  An adjacent 12 mm right for renal soft tissue nodule consistent with metastatic nodule.  Right retroperitoneal hypermetabolic lymphadenopathy.  Multiple small pulmonary nodules worrisome for hematogenous pulmonary metastases.  Multiple lytic metastases in the chest, abdomen and pelvis which have developed or enlarged since December 2018.   · 2/18/19 - Patient started Votrient 800 mg to take once a day.   · 3/4/19 - CT scan of the chest with PE protocol.  No evidence of PE.  Worsening of metastatic disease, but no definite new lesions.  No fractures.  Stable tiny bilateral pulmonary nodules.    · 3/11/19 - Patient’s chemotherapy was switched to weekly Torisel at 25 mg (Votrient was stopped secondary to tumor pain).   · 5/28/19 - PET/CT scan showed overall appears to be improvement from prior exam, specifically multiple pulmonary nodules have improved have prior study and mediastinal lymphadenopathy has improved.  There appears to be interval central necrosis involving an osseous metastasis along the left iliac vein.  4.1 cm right renal mass demonstrates suggestion of some central necrosis.  A previously described lymph node anterior to the psoas muscle is no longer well visualized.  A right  retroperitoneal FDG avid lymph node and an FDG avid soft tissue nodule in the rightpararenal space appears stable.    · 7/23/2019 - CT of upper extremity left without contrast-1. Destructive lesion present within the distal humerus as well as the proximal ulna consistent with metastatic disease. 2. Osseous metastasis within the bilateral ribs and spine as above.  · 3. Small bilateral pleural effusions with presence of cardiomegaly. Patchy airspace disease is present within the lung bases bilaterally which may be partially related to atelectasis and/or pneumonia.Evaluation limited due to respiratory motion.   · 7/28/2019 patient was initiated on external beam radiation therapy for palliative measures to control pain on her left elbow where there was destructive bone lesion in the distal humerus as well as proximal ulna consistent with metastatic disease.  30 Gy in 10 fractions  · 8/19/2019 secondary to progression of disease chemotherapy was switched to nivolumab and ipilimumab    Past Medical History:   Diagnosis Date   • Abnormal laboratory test 12/07/2018   • Anemia 12/11/2018   • Atrioventricular block, complete (CMS/HCC) 08/29/2014   • Bilateral leg edema 09/09/2014   • Bradycardia 08/29/2014   • Chest discomfort 08/24/2014   • Clear cell carcinoma of kidney (CMS/HCC) 01/28/2019   • Deep venous thrombosis of arm (CMS/HCC) 11/19/2018   • Diabetes mellitus (CMS/HCC) 10/22/2018   • Dyspnea 10/15/2018   • Dyspnea 08/29/2014   • Fatigue 10/15/2018   • Hypertension 08/29/2014   • Localized swelling, left upper limb 11/16/2018   • Osteopenia 10/30/2018   • Overweight 10/15/2018   • Pacemaker 09/30/2014    permanent   • Postmenopausal status 10/15/2018   • Preop examination 11/30/2018   • Renal insufficiency 12/11/2018       Past Surgical History:   Procedure Laterality Date   • CARDIAC CATHETERIZATION  03/14/2017   • PACEMAKER IMPLANTATION  09/18/2014    Dual chamber- BS   • PACEMAKER IMPLANTATION     • TOTAL ABDOMINAL  "HYSTERECTOMY  2000         Current Outpatient Medications:   •  ACCU-CHEK FASTCLIX LANCETS misc, ACCU-CHEK FASTCLIX LANCETS, Disp: , Rfl:   •  Blood Glucose Monitoring Suppl (ACCU-CHEK JESSIE PLUS) w/Device kit, ACCU-CHEK JESSIE PLUS w/Device KIT, Disp: , Rfl:   •  calcium carbonate (TUMS) 500 MG chewable tablet, Chew 500 mg 2 (Two) Times a Day., Disp: 60 tablet, Rfl: 3  •  carvedilol (COREG) 3.125 MG tablet, TAKE ONE TABLET BY MOUTH TWICE A DAY, Disp: 60 tablet, Rfl: 2  •  Cyanocobalamin (B-12 COMPLIANCE INJECTION) 1000 MCG/ML kit, Inject 1 mL as directed Every 30 (Thirty) Days., Disp: , Rfl:   •  fenofibrate 160 MG tablet, Take 160 mg by mouth Daily., Disp: , Rfl:   •  glucose blood (ACCU-CHEK JESSIE PLUS) test strip, ACCU-CHEK JESSIE PLUS STRP, Disp: , Rfl:   •  hydrALAZINE (APRESOLINE) 50 MG tablet, 100 mg 3 (Three) Times a Day., Disp: , Rfl:   •  Insulin Glargine (LANTUS SOLOSTAR) 100 UNIT/ML injection pen, Inject 7 Units under the skin into the appropriate area as directed Daily., Disp: 15 pen, Rfl: 0  •  Insulin Pen Needle (PEN NEEDLES 3/16\") 31G X 5 MM misc, PEN NEEDLES 3/16\" 31G X 5 MM, Disp: , Rfl:   •  K Phos Liberty-Sod Phos Di & Liberty (PHOSPHOROUS PO), Take  by mouth., Disp: , Rfl:   •  Lancets Misc. (ACCU-CHEK SOFTCLIX LANCET DEV) kit, ACCU-CHEK SOFTCLIX LANCET DEV KIT, Disp: , Rfl:   •  lidocaine-prilocaine (EMLA) 2.5-2.5 % cream, APPLY TO AFFECTED AREA(S) AROUND PORT 30-60 MINUTES PRIOR TO PORT USE, Disp: 1 each, Rfl: 2  •  magnesium oxide (MAG-OX) 400 MG tablet, Take 400 mg by mouth Daily., Disp: , Rfl:   •  ondansetron (ZOFRAN) 8 MG tablet, TAKE ONE TABLET BY MOUTH EVERY 8 HOURS AS NEEDED FOR NAUSEA AND VOMITING, Disp: 20 tablet, Rfl: 3  •  potassium chloride (K-DUR,KLOR-CON) 10 MEQ CR tablet, Take 1 tablet by mouth Daily., Disp: 30 tablet, Rfl: 3  •  promethazine (PHENERGAN) 25 MG tablet, TAKE ONE TABLET BY MOUTH EVERY 4 TO 6 HOURS AS NEEDED FOR NAUSEA AND VOMITING, Disp: 20 tablet, Rfl: 2  •  vitamin C " "(ASCORBIC ACID) 500 MG tablet, Take 1,000 mg by mouth Daily., Disp: , Rfl:   •  XARELTO 20 MG tablet, TAKE ONE TABLET BY MOUTH DAILY, Disp: 30 tablet, Rfl: 3  •  Zinc Gluconate 50 MG capsule, Take  by mouth., Disp: , Rfl:     No Known Allergies    Family History   Problem Relation Age of Onset   • Hypertension Mother        Cancer-related family history is not on file.    Social History     Tobacco Use   • Smoking status: Never Smoker   • Smokeless tobacco: Never Used   Substance Use Topics   • Alcohol use: No     Frequency: Never   • Drug use: No       I have reviewed the history of present illness, past medical history, family history, social history, lab results, all notes and other records since the patient was last seen on 9/17/2019    Subjective     Patient is my office for follow-up accompanied by her friend.  She is using a brace now for the left swelling in the left especially the elbow has decreased.  Patient reports the pain is controlled now.  Tolerating the Ipi and Opdivo well.  No diarrhea, no confusion    ROS:       Review of Systems   Constitutional: Negative for fever.   HENT: Negative for nosebleeds and trouble swallowing.    Eyes: Negative for visual disturbance.   Respiratory: Negative for cough, shortness of breath and wheezing.    Cardiovascular: Negative for chest pain.   Gastrointestinal: Negative for abdominal pain and blood in stool.   Endocrine: Negative for cold intolerance.   Genitourinary: Negative for dysuria and hematuria.   Musculoskeletal: Negative for joint swelling.   Skin: Negative for rash.   Allergic/Immunologic: Negative for environmental allergies.   Neurological: Negative for seizures.   Hematological: Does not bruise/bleed easily.   Psychiatric/Behavioral: The patient is not nervous/anxious.          Objective:       Vitals:    10/15/19 1322   BP: 173/81   Pulse: 61   Resp: 18   Temp: 97.8 °F (36.6 °C)   Weight: 65.1 kg (143 lb 9.6 oz)   Height: 170.2 cm (67\")   PainSc: " 0-No pain         Physical Exam   Constitutional: She is oriented to person, place, and time. No distress.   HENT:   Head: Normocephalic and atraumatic.   Eyes: Conjunctivae and EOM are normal. Right eye exhibits no discharge. Left eye exhibits no discharge. No scleral icterus.   Neck: Trachea normal and normal range of motion. Neck supple. No thyromegaly present.   Cardiovascular: Normal rate, regular rhythm, S1 normal, S2 normal and normal heart sounds. Exam reveals no gallop and no friction rub.   Pulmonary/Chest: Effort normal and breath sounds normal. No stridor. No respiratory distress. She has no wheezes.   Abdominal: Soft. Bowel sounds are normal. She exhibits no mass. There is no hepatosplenomegaly. There is no tenderness. There is no rebound and no guarding.   Musculoskeletal: Normal range of motion. She exhibits no tenderness.   Left upper extremity swollen, swelling and tenderness of the left elbow, pulses palpable       Lymphadenopathy:     She has no cervical adenopathy.   Neurological: She is alert and oriented to person, place, and time. She has normal strength. She exhibits normal muscle tone.   Skin: Skin is warm and dry. No rash noted. She is not diaphoretic. No erythema.   Psychiatric: She has a normal mood and affect. Her speech is normal and behavior is normal.   Nursing note and vitals reviewed.    Left upper extremity swelling  RECENT LABS:     WBC   Date Value Ref Range Status   10/15/2019 10.17 3.40 - 10.80 10*3/mm3 Final     RBC   Date Value Ref Range Status   10/15/2019 5.01 3.77 - 5.28 10*6/mm3 Final     Hemoglobin   Date Value Ref Range Status   10/15/2019 14.5 12.0 - 15.9 g/dL Final     Hematocrit   Date Value Ref Range Status   10/15/2019 45.1 34.0 - 46.6 % Final     MCV   Date Value Ref Range Status   10/15/2019 90.0 79.0 - 97.0 fL Final     MCH   Date Value Ref Range Status   10/15/2019 28.9 26.6 - 33.0 pg Final     MCHC   Date Value Ref Range Status   10/15/2019 32.2 31.5 - 35.7  g/dL Final     RDW   Date Value Ref Range Status   10/15/2019 16.0 (H) 12.3 - 15.4 % Final     RDW-SD   Date Value Ref Range Status   10/15/2019 51.5 37.0 - 54.0 fl Final     MPV   Date Value Ref Range Status   10/15/2019 9.2 6.0 - 12.0 fL Final     Platelets   Date Value Ref Range Status   10/15/2019 247 140 - 450 10*3/mm3 Final     Neutrophil %   Date Value Ref Range Status   10/15/2019 76.7 (H) 42.7 - 76.0 % Final     Lymphocyte %   Date Value Ref Range Status   10/15/2019 10.4 (L) 19.6 - 45.3 % Final     Monocyte %   Date Value Ref Range Status   10/15/2019 9.5 5.0 - 12.0 % Final     Eosinophil %   Date Value Ref Range Status   10/15/2019 3.0 0.3 - 6.2 % Final     Basophil %   Date Value Ref Range Status   10/15/2019 0.4 0.0 - 1.5 % Final     Neutrophils, Absolute   Date Value Ref Range Status   10/15/2019 7.79 (H) 1.70 - 7.00 10*3/mm3 Final     Lymphocytes, Absolute   Date Value Ref Range Status   10/15/2019 1.06 0.70 - 3.10 10*3/mm3 Final     Monocytes, Absolute   Date Value Ref Range Status   10/15/2019 0.97 (H) 0.10 - 0.90 10*3/mm3 Final     Eosinophils, Absolute   Date Value Ref Range Status   10/15/2019 0.31 0.00 - 0.40 10*3/mm3 Final     Basophils, Absolute   Date Value Ref Range Status   10/15/2019 0.04 0.00 - 0.20 10*3/mm3 Final     nRBC   Date Value Ref Range Status   05/22/2019 0 0 /100[WBCs] Final       Lab Results   Component Value Date    GLUCOSE 101 (H) 09/30/2019    BUN 19 09/30/2019    CREATININE 1.20 (H) 09/30/2019    EGFRIFNONA 44 (L) 09/30/2019    EGFRIFAFRI 34 (L) 05/31/2019    BCR 15.8 09/30/2019    K 4.1 09/30/2019    CO2 28.0 09/30/2019    CALCIUM 9.0 09/30/2019    ALBUMIN 3.40 (L) 09/30/2019    LABIL2 1.1 06/10/2019    AST 23 09/30/2019    ALT 14 09/30/2019         Assessment/Plan      ASSESSMENT:   1. Metastatic renal cell carcinoma  2. Left upper extremity unprovoked DVT  3. Bone metastatic disease  4. Destructive bone lesion in the left distal humerus and proximal ulna  5. Type 2  diabetes  6. ECOG 1      PLAN:      1. Patient is currently on chemotherapy with Ipi and Opdivo.  Received 2 cycles so far tolerated relatively well will continue it at this time.  Continue Opdivo and Ipi for total 4 cycles followed by single agent Opdivo.  2. Patient is on Xarelto it was restarted by Dr. Galo after the hematuria was resolved   3. Continue the fentanyl patch and Lortab as needed  4. Continue with Xgeva.  Patient will be continued on calcium replacement with Tums  5. Patient has a device on her left elbow which is helping her preventing fractures  6. Diabetic foot ulcer almost healed up.  7. Hemoglobin is normal no further weekly CBC.  8. Continue calcium replacement with Tums  9. I will see her back in my office in 3 weeks recheck CBC CMP at that time.  New chemo orders removing Ipi will be ordered patient will be continued on Opdivo  Stop weekly CBC  I have reviewed labs results, imaging, vitals, and medications with the patient and her friend  today.      Patient and her friend verbalized understanding and is in agreement of the above plans.     This report was compiled using Dragon voice recognition software. I have made every effort to proof read this document; however, typographical errors may persist

## 2019-10-22 NOTE — TELEPHONE ENCOUNTER
Received call back from pt.  Pt states that she is only taking Tums at this time.  Instructed pt that she needs to start Calcium 600 mg daily.  Pt v/u.

## 2019-10-28 PROBLEM — R19.7 DIARRHEA: Status: ACTIVE | Noted: 2019-01-01

## 2019-10-28 NOTE — TELEPHONE ENCOUNTER
The patient needs to come in for IV fluids and BMP/mag level in the a.m. as she is likely dehydrated if she is having diarrhea not able to eat due to vomiting.  We can prescribe her Lomotil and I will go ahead and send that in.  But please make sure she is put on the schedule.  For the above I will enter the orders for that also.

## 2019-10-28 NOTE — TELEPHONE ENCOUNTER
Patient states she received chemo last Monday and has had diarrhea ever since.  She states Imodium is not helping and is requesting something stronger.  She states she is not eating.  She states every time she tries to eat she vomits.  Patient with metastatic renal cell carcinoma and bone mets.  Saw Dr. Fenton on 10-15-19.  hiram Barnhart

## 2019-10-28 NOTE — PROGRESS NOTES
REMOTE DEVICE INTERROGATION    Remote device interrogation is reviewed.     Device Company: [employer]    Battery Stable.  Time to JOSE A 3.5 years    Presenting EGM: A paced V sensed    Arrhythmia Logbook Reviewed: No recent sustained events.  Brief PAF    Device function appears Stable    Continue remote device interrogations every 3 months.

## 2019-10-29 NOTE — TELEPHONE ENCOUNTER
Notified patient that Lomotil had been called in.  She states she began taking it last night and it has helped extremely.  She states she cannot come in for fluids this week as she does not have a ride.  She states she is drinking a lot and everything is staying down.  [facility]

## 2019-11-05 NOTE — PROGRESS NOTES
Patient saw Dr. Fenton today and came back to infusion for IVFs and xgeva. She has been having persistent diarrhea which according to the grading info for opdivo is at least a grade 2 and it is recommended to start prednisone. I discussed this with Dr. Fenton and we are starting 0.5mg/kg/day and tapering over 1 month. Dr. Fenton said to further delay her next treatment 1 more week making her due on 11/25/19.

## 2019-11-05 NOTE — PROGRESS NOTES
Hematology/Oncology Outpatient Follow Up    Caroline Truong  1945    Primary Care Physician: Mercedez Dallas MD  Referring Physician: Mercedez Dallas*  Chief complaint:  Metastatic renal cell carcinoma.   Bone mets.    Left upper extremity unprovoked DVT.   History of Present Illness:   · Ms. Truong does not have a personal or family history of blood clotting disorder.  She reports about four weeks prior to presentation, she noticed swelling in her left arm. Slowly it was getting bigger and pain started, and she sought medical attention.    · 11/16/18 - Doppler ultrasound of the left upper extremity was obtained which revealed acute DVT involving the left subclavian vein, axillary vein and brachial vein. Superficial venous thrombosis involving the left basilic vein.    · 11/30/18 - VQ scan was obtained, which was low probability of PE.    · Patient was sent to the [facility] and seen initially on 12/5/18.  · 12/11/18 - CT scan of the chest, abdomen and pelvis done without contrast secondary to renal insufficiency showed multiple small pulmonary nodules.  Largest measuring 5 mm within the right lower lobe, indeterminate, but may represent small pulmonary metastasis.  Follow up recommended.  Enlarged AP window lymph node 1.4 x 2.8 cm.  Pathological fractures involving the left 1st rib and right 7th rib with underlying lytic lesions suspicious for osseous metastatic disease or myeloma.     · Exophytic lobular intermediate density lesion arising from the lower pole of the right kidney, suspicious for a primary neoplasm.  Evaluation limited due to lack of IV contrast.  Lytic lesions involving the posteromedial right acetabular wall and left iliac vein suspicious for osseous metastatic disease or myeloma.     · 12/21/18 - [place]-lambda free light chain ratio 0.98.  Serum electrophoresis normal. Serum immunofixation normal.  Urine immunofixation normal.  B12 495.  Beta 2  microglobulin 1.36.  Immunoglobulin G and M normal.  Haptoglobin 157 normal.  Ferritin 138.  Folic acid 13.1.  Creatinine 1.5.  Retic count 1.26.  Hemoccult negative x3.    · Patient was continued on indefinite Xarelto for her DVT.   2.   Oncologic History:    · 1/11/19 - Bone scan showed uptake within the posterior left 1st rib, posterior right 7th rib, L1 vertebral body and left iliac crest consistent with known lytic lesions seen on CT of 12/11/18 Consistent with metastatic disease.    · 1/24/19 - CT-guided biopsy of the left iliac crest positive for metastatic clear cell renal cell carcinoma.    · 2/4/19 - PET/CT scan showed hypermetabolic 2.3 x 4.6 cm right renal mass most consistent with renal cell carcinoma.  An adjacent 12 mm right for renal soft tissue nodule consistent with metastatic nodule.  Right retroperitoneal hypermetabolic lymphadenopathy.  Multiple small pulmonary nodules worrisome for hematogenous pulmonary metastases.  Multiple lytic metastases in the chest, abdomen and pelvis which have developed or enlarged since December 2018.   · 2/18/19 - Patient started Votrient 800 mg to take once a day.   · 3/4/19 - CT scan of the chest with PE protocol.  No evidence of PE.  Worsening of metastatic disease, but no definite new lesions.  No fractures.  Stable tiny bilateral pulmonary nodules.    · 3/11/19 - Patient’s chemotherapy was switched to weekly Torisel at 25 mg (Votrient was stopped secondary to tumor pain).   · 5/28/19 - PET/CT scan showed overall appears to be improvement from prior exam, specifically multiple pulmonary nodules have improved have prior study and mediastinal lymphadenopathy has improved.  There appears to be interval central necrosis involving an osseous metastasis along the left iliac vein.  4.1 cm right renal mass demonstrates suggestion of some central necrosis.  A previously described lymph node anterior to the psoas muscle is no longer well visualized.  A right  retroperitoneal FDG avid lymph node and an FDG avid soft tissue nodule in the rightpararenal space appears stable.    · 7/23/2019 - CT of upper extremity left without contrast-1. Destructive lesion present within the distal humerus as well as the proximal ulna consistent with metastatic disease. 2. Osseous metastasis within the bilateral ribs and spine as above.  · 3. Small bilateral pleural effusions with presence of cardiomegaly. Patchy airspace disease is present within the lung bases bilaterally which may be partially related to atelectasis and/or pneumonia.Evaluation limited due to respiratory motion.   · 7/28/2019 patient was initiated on external beam radiation therapy for palliative measures to control pain on her left elbow where there was destructive bone lesion in the distal humerus as well as proximal ulna consistent with metastatic disease.  30 Gy in 10 fractions  · 8/19/2019 secondary to progression of disease chemotherapy was switched to nivolumab and ipilimumab    Past Medical History:   Diagnosis Date   • Abnormal laboratory test 12/07/2018   • Anemia 12/11/2018   • Atrioventricular block, complete (CMS/HCC) 08/29/2014   • Bilateral leg edema 09/09/2014   • Bradycardia 08/29/2014   • Chest discomfort 08/24/2014   • Clear cell carcinoma of kidney (CMS/HCC) 01/28/2019   • Deep venous thrombosis of arm (CMS/HCC) 11/19/2018   • Diabetes mellitus (CMS/HCC) 10/22/2018   • Dyspnea 10/15/2018   • Dyspnea 08/29/2014   • Fatigue 10/15/2018   • Hypertension 08/29/2014   • Localized swelling, left upper limb 11/16/2018   • Osteopenia 10/30/2018   • Overweight 10/15/2018   • Pacemaker 09/30/2014    permanent   • Postmenopausal status 10/15/2018   • Preop examination 11/30/2018   • Renal insufficiency 12/11/2018       Past Surgical History:   Procedure Laterality Date   • CARDIAC CATHETERIZATION  03/14/2017   • PACEMAKER IMPLANTATION  09/18/2014    Dual chamber- BS   • PACEMAKER IMPLANTATION     • TOTAL ABDOMINAL  "HYSTERECTOMY  2000         Current Outpatient Medications:   •  ACCU-CHEK FASTCLIX LANCETS misc, ACCU-CHEK FASTCLIX LANCETS, Disp: , Rfl:   •  Blood Glucose Monitoring Suppl (ACCU-CHEK JESSIE PLUS) w/Device kit, ACCU-CHEK JESSIE PLUS w/Device KIT, Disp: , Rfl:   •  calcium carbonate (TUMS) 500 MG chewable tablet, Chew 500 mg 2 (Two) Times a Day., Disp: 60 tablet, Rfl: 3  •  carvedilol (COREG) 3.125 MG tablet, TAKE ONE TABLET BY MOUTH TWICE A DAY, Disp: 60 tablet, Rfl: 2  •  Cyanocobalamin (B-12 COMPLIANCE INJECTION) 1000 MCG/ML kit, Inject 1 mL as directed Every 30 (Thirty) Days., Disp: , Rfl:   •  diphenoxylate-atropine (LOMOTIL) 2.5-0.025 MG per tablet, Take 1 tablet by mouth 4 (Four) Times a Day As Needed for Diarrhea., Disp: 20 tablet, Rfl: 0  •  fenofibrate 160 MG tablet, Take 160 mg by mouth Daily., Disp: , Rfl:   •  glucose blood (ACCU-CHEK JESSIE PLUS) test strip, ACCU-CHEK JESSIE PLUS STRP, Disp: , Rfl:   •  hydrALAZINE (APRESOLINE) 50 MG tablet, 100 mg 3 (Three) Times a Day., Disp: , Rfl:   •  Insulin Glargine (LANTUS SOLOSTAR) 100 UNIT/ML injection pen, Inject 7 Units under the skin into the appropriate area as directed Daily., Disp: 15 pen, Rfl: 0  •  Insulin Pen Needle (PEN NEEDLES 3/16\") 31G X 5 MM misc, PEN NEEDLES 3/16\" 31G X 5 MM, Disp: , Rfl:   •  K Phos [place]-Sod Phos Di & [place] (PHOSPHOROUS PO), Take  by mouth., Disp: , Rfl:   •  Lancets Misc. (ACCU-CHEK SOFTCLIX LANCET DEV) kit, ACCU-CHEK SOFTCLIX LANCET DEV KIT, Disp: , Rfl:   •  lidocaine-prilocaine (EMLA) 2.5-2.5 % cream, APPLY TO AFFECTED AREA(S) AROUND PORT 30-60 MINUTES PRIOR TO PORT USE, Disp: 1 each, Rfl: 2  •  magnesium oxide (MAG-OX) 400 MG tablet, Take 400 mg by mouth Daily., Disp: , Rfl:   •  metoclopramide (REGLAN) 5 MG tablet, Take 1 tablet by mouth 4 (Four) Times a Day As Needed (nausea)., Disp: 120 tablet, Rfl: 3  •  ondansetron (ZOFRAN) 8 MG tablet, TAKE ONE TABLET BY MOUTH EVERY 8 HOURS AS NEEDED FOR NAUSEA AND VOMITING, Disp: 20 " tablet, Rfl: 3  •  pantoprazole (PROTONIX) 20 MG EC tablet, Take 1 tablet by mouth Daily., Disp: 30 tablet, Rfl: 3  •  potassium chloride (K-DUR,KLOR-CON) 10 MEQ CR tablet, Take 1 tablet by mouth Daily., Disp: 30 tablet, Rfl: 3  •  vitamin C (ASCORBIC ACID) 500 MG tablet, Take 1,000 mg by mouth Daily., Disp: , Rfl:   •  XARELTO 20 MG tablet, TAKE ONE TABLET BY MOUTH DAILY, Disp: 30 tablet, Rfl: 3  •  Zinc Gluconate 50 MG capsule, Take  by mouth., Disp: , Rfl:     No Known Allergies    Family History   Problem Relation Age of Onset   • Hypertension Mother        Cancer-related family history is not on file.    Social History     Tobacco Use   • Smoking status: Never Smoker   • Smokeless tobacco: Never Used   Substance Use Topics   • Alcohol use: No     Frequency: Never   • Drug use: No       I have reviewed the history of present illness, past medical history, family history, social history, lab results, all notes and other records since the patient was last seen on 9/17/2019    Subjective     Patient is my office for follow-up accompanied by her friend.  Patient developed diarrhea which has been going on for 2 weeks.  Lomotil helps a little bit Imodium not helping.  Lost about 10 pounds weight.  Patient taking Reglan and magnesium.  Decreased p.o. intake drinking water    ROS:       Review of Systems   Constitutional: Negative for fever.   HENT: Negative for nosebleeds and trouble swallowing.    Eyes: Negative for visual disturbance.   Respiratory: Negative for cough, shortness of breath and wheezing.    Cardiovascular: Negative for chest pain.   Gastrointestinal: Negative for abdominal pain and blood in stool.   Endocrine: Negative for cold intolerance.   Genitourinary: Negative for dysuria and hematuria.   Musculoskeletal: Negative for joint swelling.   Skin: Negative for rash.   Allergic/Immunologic: Negative for environmental allergies.   Neurological: Negative for seizures.   Hematological: Does not  "bruise/bleed easily.   Psychiatric/Behavioral: The patient is not nervous/anxious.          Objective:       Vitals:    11/05/19 1414   BP: 119/76   Pulse: 108   Resp: 18   Temp: 97.7 °F (36.5 °C)   Weight: 57.2 kg (126 lb)   Height: 170.2 cm (67\")   PainSc: 0-No pain         Physical Exam   Constitutional: She is oriented to person, place, and time. No distress.   HENT:   Head: Normocephalic and atraumatic.   Eyes: Conjunctivae and EOM are normal. Right eye exhibits no discharge. Left eye exhibits no discharge. No scleral icterus.   Neck: Trachea normal and normal range of motion. Neck supple. No thyromegaly present.   Cardiovascular: Normal rate, regular rhythm, S1 normal, S2 normal and normal heart sounds. Exam reveals no gallop and no friction rub.   Pulmonary/Chest: Effort normal and breath sounds normal. No stridor. No respiratory distress. She has no wheezes.   Abdominal: Soft. Bowel sounds are normal. She exhibits no mass. There is no hepatosplenomegaly. There is no tenderness. There is no rebound and no guarding.   Musculoskeletal: Normal range of motion. She exhibits no tenderness.   Left upper extremity swollen, swelling and tenderness of the left elbow, pulses palpable       Lymphadenopathy:     She has no cervical adenopathy.   Neurological: She is alert and oriented to person, place, and time. She has normal strength. She exhibits normal muscle tone.   Skin: Skin is warm and dry. No rash noted. She is not diaphoretic. No erythema.   Psychiatric: She has a normal mood and affect. Her speech is normal and behavior is normal.   Nursing note and vitals reviewed.    Left upper extremity swelling  RECENT LABS:     WBC   Date Value Ref Range Status   11/05/2019 8.87 3.40 - 10.80 10*3/mm3 Final     RBC   Date Value Ref Range Status   11/05/2019 5.55 (H) 3.77 - 5.28 10*6/mm3 Final     Hemoglobin   Date Value Ref Range Status   11/05/2019 16.3 (H) 12.0 - 15.9 g/dL Final     Hematocrit   Date Value Ref Range " Status   11/05/2019 49.5 (H) 34.0 - 46.6 % Final     MCV   Date Value Ref Range Status   11/05/2019 89.2 79.0 - 97.0 fL Final     MCH   Date Value Ref Range Status   11/05/2019 29.4 26.6 - 33.0 pg Final     MCHC   Date Value Ref Range Status   11/05/2019 32.9 31.5 - 35.7 g/dL Final     RDW   Date Value Ref Range Status   11/05/2019 18.2 (H) 12.3 - 15.4 % Final     RDW-SD   Date Value Ref Range Status   11/05/2019 56.8 (H) 37.0 - 54.0 fl Final     MPV   Date Value Ref Range Status   11/05/2019 8.8 6.0 - 12.0 fL Final     Platelets   Date Value Ref Range Status   11/05/2019 269 140 - 450 10*3/mm3 Final     Neutrophil %   Date Value Ref Range Status   11/05/2019 79.1 (H) 42.7 - 76.0 % Final     Lymphocyte %   Date Value Ref Range Status   11/05/2019 6.5 (L) 19.6 - 45.3 % Final     Monocyte %   Date Value Ref Range Status   11/05/2019 14.1 (H) 5.0 - 12.0 % Final     Eosinophil %   Date Value Ref Range Status   11/05/2019 0.2 (L) 0.3 - 6.2 % Final     Basophil %   Date Value Ref Range Status   11/05/2019 0.1 0.0 - 1.5 % Final     Neutrophils, Absolute   Date Value Ref Range Status   11/05/2019 7.01 (H) 1.70 - 7.00 10*3/mm3 Final     Lymphocytes, Absolute   Date Value Ref Range Status   11/05/2019 0.58 (L) 0.70 - 3.10 10*3/mm3 Final     Monocytes, Absolute   Date Value Ref Range Status   11/05/2019 1.25 (H) 0.10 - 0.90 10*3/mm3 Final     Eosinophils, Absolute   Date Value Ref Range Status   11/05/2019 0.02 0.00 - 0.40 10*3/mm3 Final     Basophils, Absolute   Date Value Ref Range Status   11/05/2019 0.01 0.00 - 0.20 10*3/mm3 Final     nRBC   Date Value Ref Range Status   05/22/2019 0 0 /100[WBCs] Final       Lab Results   Component Value Date    GLUCOSE 125 (H) 10/21/2019    BUN 19 10/21/2019    CREATININE 0.99 10/21/2019    EGFRIFNONA 55 (L) 10/21/2019    EGFRIFAFRI 34 (L) 05/31/2019    BCR 19.2 10/21/2019    K 4.1 10/21/2019    CO2 21.0 (L) 10/21/2019    CALCIUM 8.1 (L) 10/21/2019    ALBUMIN 3.60 10/21/2019    LABIL2 1.1  06/10/2019    AST 38 (H) 10/21/2019    ALT 25 10/21/2019         Assessment/Plan      ASSESSMENT:   1. Metastatic renal cell carcinoma  2. Left upper extremity unprovoked DVT  3. Bone metastatic disease  4. Destructive bone lesion in the left distal humerus and proximal ulna  5. Type 2 diabetes  6. Chemotherapy-induced diarrhea  7. ECOG 1      PLAN:      1. Patient received 4 cycles of chemotherapy with Ipi and Opdivo.   patient developed severe diarrhea related to Ipi.  He has grade 3 colitis.  I will start her on steroids per protocol.  Give IV fluids today 500 cc half-normal saline.  Patient to report was that the diarrhea persists.  2. Postpone the chemotherapy with Opdivo by 2 weeks  3.  Patient is on Xarelto it was restarted by Dr. Galo after the hematuria was resolved   4. Continue the fentanyl patch and Lortab as needed  5. Continue with Xgeva.  Patient will be continued on calcium replacement with Tums  6. Patient has a device on her left elbow which is helping her preventing fractures  7. Diabetic foot ulcer  healed up.  8. Hemoglobin is normal no further weekly CBC.  9. Continue calcium replacement with Tums  10. Stop Reglan and magnesium  11. I will see her back in my office in 2 weeks recheck CBC CMP at that time.    Stop weekly CBC  I have reviewed labs results, imaging, vitals, and medications with the patient and her friend  today.      Patient and her friend verbalized understanding and is in agreement of the above plans.     This report was compiled using Dragon voice recognition software. I have made every effort to proof read this document; however, typographical errors may persist

## 2019-11-05 NOTE — PATIENT INSTRUCTIONS
Start steroids (prednisone) for severe diarrhea.    30mg (3 tabs) everyday for 1 week  20mg (2 tabs) everyday for 1 week  10mg (1 tab) everyday for 1 week  5mg (1/2 tab) everyday for 1 week  5mg (1/2 tab) every other day for 3 doses

## 2019-11-05 NOTE — PROGRESS NOTES
Case Management/ Note    Patient Name: Caroline Truong  YOB: 1945  MRN #: [id number]    OSW met with Yasmin and Sarah Almanzar. They said Yasmin was not doing well and had lost 18 pounds in two weeks. She saw the doctor today. Her spirits are good and she remains with a positive attitude. She does not have any noted concerns except to feel better. Supportive care provided. OSW will remain available.     Electronically signed by:   Luz Maria Ulrich LCSW, OSW-C  11/05/19, 4:00 PM

## 2019-11-06 PROBLEM — E86.0 DEHYDRATION: Status: ACTIVE | Noted: 2019-01-01

## 2019-11-06 NOTE — TELEPHONE ENCOUNTER
----- Message from TOMMY Guerra sent at 11/6/2019  9:52 AM EST -----  Please set up appointment for IV fluids and labs today or tomorrow.  I will put in a hydration care plan.

## 2019-11-06 NOTE — TELEPHONE ENCOUNTER
I called to let the pt know and she states that she can not do it today or tomorrow. She states that she got fluids yesterday. I asked when she would be able to come in and she states that she doesn't know but will have to check with her ride and call back when she can. NP made aware.

## 2019-11-06 NOTE — TELEPHONE ENCOUNTER
Patient states she needs to be scheduled for fluids per Dr. Armand Barnhart, [facility]

## 2019-11-06 NOTE — PROGRESS NOTES
Please set up appointment for IV fluids and labs today or tomorrow.  I will put in a hydration care plan.

## 2019-11-11 PROBLEM — E83.51 HYPOCALCEMIA: Status: ACTIVE | Noted: 2019-01-01

## 2019-11-11 NOTE — ED PROVIDER NOTES
Subjective   Context: 74-year-old female patient to the ER for correction of her low calcium; patient's family member reports that the patient was seen in the allergy office today, was diagnosed with hypocalcemia and was sent to the ER for evaluation and admission for IV calcium infusion; daughter reports that the patient had recently had multiple issues with prolonged diarrhea, states that her magnesium was recently discontinued due to the prolonged diarrhea stools.  No fever or chills, denies abdominal pains.  The patient reports no complaints at this time.  Patient is being treated with IV chemotherapy for bone cancer with kidney involvement.  Reports last diarrhea stool was yesterday.  Denies blaine blood.      Location:  Not applicable, no pain  Quality:  Timing:  Duration:   Aggravating:  Alleviating:    Onc:  Dev            Review of Systems   Constitutional: Negative for chills, fatigue and fever.   HENT: Negative for congestion, dental problem, ear discharge, ear pain, mouth sores, sore throat, trouble swallowing and voice change.    Eyes: Negative for photophobia, pain, discharge and visual disturbance.   Respiratory: Negative for cough, chest tightness and shortness of breath.    Cardiovascular: Negative for chest pain, palpitations and leg swelling.   Gastrointestinal: Positive for diarrhea. Negative for abdominal pain, nausea and vomiting.   Genitourinary: Negative for decreased urine volume, difficulty urinating, dysuria, flank pain, hematuria and urgency.   Musculoskeletal: Negative for arthralgias, back pain, myalgias, neck pain and neck stiffness.   Skin: Negative for color change, pallor, rash and wound.   Neurological: Negative for dizziness, weakness, light-headedness, numbness and headaches.   Hematological: Negative for adenopathy. Does not bruise/bleed easily.   Psychiatric/Behavioral: Negative for confusion.     Prior to Admission medications    Medication Sig Start Date End Date Taking?  "Authorizing Provider   ACCU-CHEK FASTCLIX LANCETS misc ACCU-CHEK FASTCLIX LANCETS 5/29/19   Diana Olivera MD   Blood Glucose Monitoring Suppl (ACCU-CHEK JESSIE PLUS) w/Device kit ACCU-CHEK JESSIE PLUS w/Device KIT 6/10/19   Diana Olivera MD   calcium carbonate (TUMS) 500 MG chewable tablet Chew 500 mg 2 (Two) Times a Day. 7/9/19   Rachele Carrillo APRN   carvedilol (COREG) 3.125 MG tablet TAKE ONE TABLET BY MOUTH TWICE A DAY 10/6/19   Steve Griggs MD   Cyanocobalamin (B-12 COMPLIANCE INJECTION) 1000 MCG/ML kit Inject 1 mL as directed Every 30 (Thirty) Days.    Diana Olivera MD   diphenoxylate-atropine (LOMOTIL) 2.5-0.025 MG per tablet Take 1 tablet by mouth 4 (Four) Times a Day As Needed for Diarrhea. 11/11/19   Loren Ledezma APRN   fenofibrate 160 MG tablet Take 160 mg by mouth Daily. 10/22/18   Mercedez Dallas MD   glucose blood (ACCU-CHEK JESSIE PLUS) test strip ACCU-CHEK JESSIE PLUS STRP 5/29/19   Diana Olivera MD   hydrALAZINE (APRESOLINE) 50 MG tablet 100 mg 3 (Three) Times a Day. 5/22/19   Diana Olivera MD   Insulin Glargine (LANTUS SOLOSTAR) 100 UNIT/ML injection pen Inject 7 Units under the skin into the appropriate area as directed Daily. 6/21/19   Mercedez Dallas MD   Insulin Pen Needle (PEN NEEDLES 3/16\") 31G X 5 MM misc PEN NEEDLES 3/16\" 31G X 5 MM 5/24/19   Diana Olivera MD   K Phos McKenzie-Sod Phos Di & McKenzie (PHOSPHOROUS PO) Take  by mouth.    Diana Olivera MD   Lancets Misc. (ACCU-CHEK SOFTCLIX LANCET DEV) kit ACCU-CHEK SOFTCLIX LANCET DEV KIT 6/10/19   Diana Olivera MD   lidocaine-prilocaine (EMLA) 2.5-2.5 % cream APPLY TO AFFECTED AREA(S) AROUND PORT 30-60 MINUTES PRIOR TO PORT USE 9/9/19   Loren Ledezma APRN   magnesium oxide (MAG-OX) 400 MG tablet Take 400 mg by mouth Daily. 10/22/18   Provider, MD Diana   ondansetron (ZOFRAN) 8 MG tablet TAKE ONE TABLET BY MOUTH EVERY 8 HOURS AS " NEEDED FOR NAUSEA AND VOMITING 9/30/19   Loren Ledezma APRN   pantoprazole (PROTONIX) 20 MG EC tablet Take 1 tablet by mouth Daily. 10/21/19   Rachele Carrillo APRN   potassium chloride (K-DUR,KLOR-CON) 10 MEQ CR tablet Take 1 tablet by mouth Daily. 8/13/19   Mercedez Dallas MD   predniSONE (DELTASONE) 10 MG tablet Take 1 tablet by mouth Take As Directed. 30mg daily x1 wk; 20mg daily x1 wk; 10mg daily x1 wk; 5mg daily x1 wk; 5mg every other day x 3doses 11/5/19   Loren Ledezma APRN   vitamin C (ASCORBIC ACID) 500 MG tablet Take 1,000 mg by mouth Daily.    Provider, MD Diana   XARELTO 20 MG tablet TAKE ONE TABLET BY MOUTH DAILY 7/1/19   Loren Ledezma APRN   Zinc Gluconate 50 MG capsule Take  by mouth.    Provider, Diana, MD   diphenoxylate-atropine (LOMOTIL) 2.5-0.025 MG per tablet Take 1 tablet by mouth 4 (Four) Times a Day As Needed for Diarrhea. 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[facility]) 01/28/2019   • Deep venous thrombosis of arm (CMS/[facility]) 11/19/2018   • Diabetes mellitus (CMS/HCC) 10/22/2018   • Dyspnea 10/15/2018   • Dyspnea 08/29/2014   • Fatigue 10/15/2018   • Hypertension 08/29/2014   • Localized swelling, left upper limb 11/16/2018   • Osteopenia 10/30/2018   • Overweight 10/15/2018   • Pacemaker 09/30/2014    permanent   • Postmenopausal status 10/15/2018   • Preop examination 11/30/2018   • Renal insufficiency 12/11/2018       No Known Allergies    Past Surgical History:   Procedure Laterality Date   • CARDIAC CATHETERIZATION  03/14/2017   • PACEMAKER IMPLANTATION  09/18/2014    Dual chamber- BS   • PACEMAKER IMPLANTATION     • TOTAL ABDOMINAL HYSTERECTOMY  2000        Family History   Problem Relation Age of Onset   • Hypertension Mother        Social History     Socioeconomic History   • Marital status:      Spouse name: Not on file   • Number of children: Not on file   • Years of education: Not on file   • Highest education level: Not on file   Tobacco Use   • Smoking status: Never Smoker   • Smokeless tobacco: Never Used   Substance and Sexual Activity   • Alcohol use: No     Frequency: Never   • Drug use: No   • Sexual activity: No           Objective   Physical Exam       Vital signs and triage nurse note reviewed.   Constitutional: Awake, alert; well-developed and well-nourished. No acute distress is noted.   HEENT: Normocephalic, atraumatic; pupils are PERRL with intact EOM; oropharynx is pink and moist without exudate or erythema.   Neck: Supple, full range of motion without pain; no cervical lymphadenopathy.   Cardiovascular: Regular rate and rhythm, normal S1-S2.   Pulmonary: Respiratory effort regular nonlabored, breath sounds clear to auscultation all fields.  Infusaport noted right upper chest wall   Abdomen: Soft, nontender nondistended with normoactive bowel sounds; no rebound or guarding.   Musculoskeletal: Independent range of motion of all extremities with no palpable tenderness or edema.   Neuro: Alert oriented x3, speech is clear and appropriate, GCS 15   Skin:  Fleshtone warm, dry, intact; no erythematous or petechial rash or lesion    Procedures         ECG 12 Lead   Preliminary Result   HEART RATE= 76  bpm   RR Interval= 792  ms   MD Interval= 148  ms   P Horizontal Axis= -20  deg   P Front Axis= 66  deg   QRSD Interval= 81  ms   QT Interval= 373  ms   QRS Axis= 35  deg   T Wave Axis= 77  deg   - ABNORMAL ECG -   Sinus rhythm   Anterior infarct, old   Electronically Signed By:    Date and Time of Study: 2019-11-11 16:29:29        Viewed by me, viewed and interpreted by Dr Armstrong: Agree with above, here to prior EKG performed August 31, 2014, no  "change  ED Course          Xr Chest 1 View    Result Date: 11/11/2019  No acute cardiopulmonary process.  Electronically Signed By-Enedina Saini On:11/11/2019 5:58 PM This report was finalized on [id number] by  Enedina Saini, .    Results for orders placed or performed during the hospital encounter of 11/11/19   CBC Auto Differential   Result Value Ref Range    WBC 7.80 3.40 - 10.80 10*3/mm3    RBC 4.52 3.77 - 5.28 10*6/mm3    Hemoglobin 13.1 12.0 - 15.9 g/dL    Hematocrit 40.6 34.0 - 46.6 %    MCV 90.0 79.0 - 97.0 fL    MCH 29.0 26.6 - 33.0 pg    MCHC 32.2 31.5 - 35.7 g/dL    RDW 16.2 (H) 12.3 - 15.4 %    RDW-SD 52.5 37.0 - 54.0 fl    MPV 7.7 6.0 - 12.0 fL    Platelets 198 140 - 450 10*3/mm3    Neutrophil % 83.8 (H) 42.7 - 76.0 %    Lymphocyte % 7.9 (L) 19.6 - 45.3 %    Monocyte % 7.8 5.0 - 12.0 %    Eosinophil % 0.4 0.3 - 6.2 %    Basophil % 0.1 0.0 - 1.5 %    Neutrophils, Absolute 6.60 1.70 - 7.00 10*3/mm3    Lymphocytes, Absolute 0.60 (L) 0.70 - 3.10 10*3/mm3    Monocytes, Absolute 0.60 0.10 - 0.90 10*3/mm3    Eosinophils, Absolute 0.00 0.00 - 0.40 10*3/mm3    Basophils, Absolute 0.00 0.00 - 0.20 10*3/mm3    nRBC 0.0 0.0 - 0.2 /100 WBC   Magnesium   Result Value Ref Range    Magnesium 1.3 (L) 1.6 - 2.4 mg/dL   Phosphorus   Result Value Ref Range    Phosphorus 2.1 (L) 2.5 - 4.5 mg/dL     Medications   sodium chloride 0.9 % flush 10 mL (not administered)   sodium chloride 0.9 % infusion (75 mL/hr Intravenous New Bag 11/11/19 7016)   calcium gluconate 2-0.675 GM/100ML NACL IVPB (not administered)     /62   Pulse 76   Temp 97.3 °F (36.3 °C) (Oral)   Resp 16   Ht 170.2 cm (67\")   Wt 55.7 kg (122 lb 12.7 oz)   SpO2 99%   BMI 19.23 kg/m²           MDM  Number of Diagnoses or Management Options  Diarrhea, unspecified type:   Hypocalcemia:      Amount and/or Complexity of Data Reviewed  Review and summarize past medical records: yes (Labs reviewed, the patient's calcium was 6.4, serum mag 1.5)  Discuss the " patient with other providers: (Hospitalist for admission:  Dr Medina)      IV calcium gluconate given in the ED, discussed administering IV magnesium however the patient and her daughter state that magnesium was recently discontinued due to diarrhea and declined to have IV replacement given; conditions at this time made for admission to the hospital for further evaluation and treatment, she voices understanding and agrees.  Patient was discussed with hospitalist who accepts the patient for admission to medical monitored bed for continued evaluation, per his request additional labs were ordered.  Patient remained stable in the ED with normal vital signs and is admitted in stable condition.  Pending labs will be followed and evaluated by admitting hospitalist      Final diagnoses:   Hypocalcemia   Diarrhea, unspecified type   Renal insufficiency              Lorena Daly, HOMERO  11/11/19 [id number]

## 2019-11-11 NOTE — PROGRESS NOTES
Patient is here for IVFs and repeat labs. She states her diarrhea is improving. She only had 2 bowel movements yesterday but when she does go it is watery. Dr. Fenton said she can continue taking lomotil as needed.     Labs done today and Calcium level is 6.4. Discussed with Dr. Fenton and she said to send the patient to the ER. Report called to Jaqueline. Port needle left in place.

## 2019-11-12 PROBLEM — E83.51 HYPOCALCEMIA: Status: ACTIVE | Noted: 2019-01-01

## 2019-11-12 PROBLEM — E83.51 HYPOCALCEMIA: Status: RESOLVED | Noted: 2019-01-01 | Resolved: 2019-01-01

## 2019-11-12 NOTE — H&P
[facility] HOSPITALIST     Mercedze Dallas MD    Patient Care Team:  Mercedez Dallas MD as PCP - General (Family Medicine)  Franklin García MD as PCP - Claims Attributed  Marco Sethi MD as Consulting Physician (Nephrology)    CHIEF COMPLAINT:     Chief Complaint   Patient presents with   • Abnormal Calcium       HISTORY OF PRESENT ILLNESS:    Context: 74-year-old female patient to the ER for correction of her low calcium; patient's family member reports that the patient was seen in the allergy office today, was diagnosed with hypocalcemia and was sent to the ER for evaluation and admission for IV calcium infusion; daughter reports that the patient had recently had multiple issues with prolonged diarrhea, states that her magnesium was recently discontinued due to the prolonged diarrhea stools.  No fever or chills, denies abdominal pains.  The patient reports no complaints at this time.  Patient is being treated with IV chemotherapy for bone cancer with kidney involvement.  Reports last diarrhea stool was yesterday.  Denies blaine blood.        Location:  Not applicable, no pain  Quality:  Timing:  Duration:   Aggravating:  Alleviating:     Onc:  Dev      Past Medical History:   Diagnosis Date   • Abnormal laboratory test 12/07/2018   • Anemia 12/11/2018   • Atrioventricular block, complete (CMS/HCC) 08/29/2014   • Bilateral leg edema 09/09/2014   • Bradycardia 08/29/2014   • Chest discomfort 08/24/2014   • Clear cell carcinoma of kidney (CMS/HCC) 01/28/2019   • Deep venous thrombosis of arm (CMS/HCC) 11/19/2018   • Diabetes mellitus (CMS/HCC) 10/22/2018   • Dyspnea 10/15/2018   • Dyspnea 08/29/2014   • Fatigue 10/15/2018   • Hypertension 08/29/2014   • Localized swelling, left upper limb 11/16/2018   • Osteopenia 10/30/2018   • Overweight 10/15/2018   • Pacemaker 09/30/2014    permanent   • Postmenopausal status 10/15/2018   • Preop examination  11/30/2018   • Renal insufficiency 12/11/2018     Past Surgical History:   Procedure Laterality Date   • CARDIAC CATHETERIZATION  03/14/2017   • PACEMAKER IMPLANTATION  09/18/2014    Dual chamber- BS   • PACEMAKER IMPLANTATION     • TOTAL ABDOMINAL HYSTERECTOMY  2000     Family History   Problem Relation Age of Onset   • Hypertension Mother      Social History     Tobacco Use   • Smoking status: Never Smoker   • Smokeless tobacco: Never Used   Substance Use Topics   • Alcohol use: No     Frequency: Never   • Drug use: No     Medications Prior to Admission   Medication Sig Dispense Refill Last Dose   • calcium carbonate (TUMS) 500 MG chewable tablet Chew 500 mg 2 (Two) Times a Day. 60 tablet 3 11/11/2019 at Unknown time   • carvedilol (COREG) 3.125 MG tablet Take 3.125 mg by mouth 2 (Two) Times a Day With Meals.   11/11/2019 at Unknown time   • Cyanocobalamin (B-12 COMPLIANCE INJECTION) 1000 MCG/ML kit Inject 1 mL as directed Every 30 (Thirty) Days.   Taking   • diphenoxylate-atropine (LOMOTIL) 2.5-0.025 MG per tablet Take 1 tablet by mouth 4 (Four) Times a Day As Needed for Diarrhea. 20 tablet 0    • fenofibrate 160 MG tablet Take 160 mg by mouth Daily.   Taking   • hydrALAZINE (APRESOLINE) 100 MG tablet Take 100 mg by mouth 3 (Three) Times a Day.   Taking   • Insulin Glargine (LANTUS SOLOSTAR) 100 UNIT/ML injection pen Inject 7 Units under the skin into the appropriate area as directed Daily. 15 pen 0 Taking   • lidocaine-prilocaine (EMLA) 2.5-2.5 % cream APPLY TO AFFECTED AREA(S) AROUND PORT 30-60 MINUTES PRIOR TO PORT USE 1 each 2 Taking   • lisinopril (PRINIVIL,ZESTRIL) 5 MG tablet Take 5 mg by mouth 2 (Two) Times a Day.   11/11/2019 at Unknown time   • metoclopramide (REGLAN) 10 MG tablet Take 10 mg by mouth 4 (Four) Times a Day Before Meals & at Bedtime.   11/11/2019 at Unknown time   • ondansetron (ZOFRAN) 8 MG tablet Take  by mouth Every 8 (Eight) Hours As Needed for Nausea or Vomiting.      • pantoprazole  (PROTONIX) 20 MG EC tablet Take 1 tablet by mouth Daily. 30 tablet 3 11/11/2019 at Unknown time   • potassium chloride (K-DUR,KLOR-CON) 10 MEQ CR tablet Take 1 tablet by mouth Daily. 30 tablet 3 11/11/2019 at Unknown time   • predniSONE (DELTASONE) 10 MG tablet Take 1 tablet by mouth Take As Directed. 30mg daily x1 wk; 20mg daily x1 wk; 10mg daily x1 wk; 5mg daily x1 wk; 5mg every other day x 3doses (Patient taking differently: Take  by mouth Take As Directed. 30mg daily x1 wk; 20mg daily x1 wk; 10mg daily x1 wk; 5mg daily x1 wk; 5mg every other day x 3doses) 47 tablet 0 11/11/2019 at Unknown time   • rivaroxaban (XARELTO) 20 MG tablet Take 20 mg by mouth Daily.   11/11/2019 at Unknown time   • vitamin C (ASCORBIC ACID) 500 MG tablet Take 1,000 mg by mouth Daily.   11/11/2019 at Unknown time   • Zinc Gluconate 50 MG capsule Take 50 mg by mouth Daily.   11/11/2019 at Unknown time     Allergies:  Patient has no known allergies.      There is no immunization history on file for this patient.        REVIEW OF SYSTEMS:     Review of Systems   Constitutional: Negative for chills, fatigue and fever.   HENT: Negative for congestion, dental problem, ear discharge, ear pain, mouth sores, sore throat, trouble swallowing and voice change.    Eyes: Negative for photophobia, pain, discharge and visual disturbance.   Respiratory: Negative for cough, chest tightness and shortness of breath.    Cardiovascular: Negative for chest pain, palpitations and leg swelling.   Gastrointestinal: Positive for diarrhea. Negative for abdominal pain, nausea and vomiting.   Genitourinary: Negative for decreased urine volume, difficulty urinating, dysuria, flank pain, hematuria and urgency.   Musculoskeletal: Negative for arthralgias, back pain, myalgias, neck pain and neck stiffness.   Skin: Negative for color change, pallor, rash and wound.   Neurological: Negative for dizziness, weakness, light-headedness, numbness and headaches.   Hematological:  "Negative for adenopathy. Does not bruise/bleed easily.   Psychiatric/Behavioral: Negative for confusion.       Vital Signs  Temp:  [97.3 °F (36.3 °C)-98.1 °F (36.7 °C)] 98.1 °F (36.7 °C)  Heart Rate:  [74-76] 75  Resp:  [16-18] 17  BP: ()/(55-71) 116/71    Flowsheet Rows      First Filed Value   Admission Height  170.2 cm (67\") Documented at 11/11/2019 1624   Admission Weight  55.7 kg (122 lb 12.7 oz) Documented at 11/11/2019 1624           Physical Exam:    Physical Exam   Constitutional: She is oriented to person, place, and time. She appears well-developed and well-nourished. No distress.   HENT:   Head: Normocephalic and atraumatic.   Right Ear: External ear normal.   Left Ear: External ear normal.   Nose: Nose normal.   Mouth/Throat: Oropharynx is clear and moist. No oropharyngeal exudate.   Eyes: Conjunctivae and EOM are normal. Pupils are equal, round, and reactive to light. Right eye exhibits no discharge. Left eye exhibits no discharge. No scleral icterus.   Neck: Normal range of motion. No JVD present. No tracheal deviation present. No thyromegaly present.   Cardiovascular: Normal rate, regular rhythm, normal heart sounds and intact distal pulses. Exam reveals no gallop and no friction rub.   No murmur heard.  Pulmonary/Chest: Effort normal and breath sounds normal. No stridor. No respiratory distress. She has no wheezes. She has no rales. She exhibits no tenderness.   Right upper chest [id number]-b-[id number] present  Left upper chest permanent pacemaker present   Abdominal: Soft. Bowel sounds are normal. She exhibits no distension and no mass. There is no tenderness. There is no rebound and no guarding. No hernia.   Midline scar from unknown surgery.   Musculoskeletal: Normal range of motion. She exhibits no edema, tenderness or deformity.   Left upper extremity brace secondary to metastasis and bones   Lymphadenopathy:     She has no cervical adenopathy.   Neurological: She is alert and oriented to person, " place, and time. No cranial nerve deficit or sensory deficit. She exhibits normal muscle tone. Coordination normal.   Skin: Skin is warm and dry. No rash noted. She is not diaphoretic. No erythema.   Psychiatric: She has a normal mood and affect. Her behavior is normal.   Nursing note and vitals reviewed.          Emotional Behavior:   Appropriate   Debilities:  Age appropriate      Results Review:    I reviewed the patient's new clinical results.      Results from last 7 days   Lab Units 11/11/19  1800 11/11/19  1341 11/05/19  1430   WBC 10*3/mm3 7.80 8.18 8.87   HEMOGLOBIN g/dL 13.1 13.7 16.3*   HEMATOCRIT % 40.6 42.3 49.5*   PLATELETS 10*3/mm3 198 210 269     Results from last 7 days   Lab Units 11/11/19  2140 11/11/19  1800 11/11/19  1341   SODIUM mmol/L 144 141 141   POTASSIUM mmol/L 4.6 4.7 5.3*   CHLORIDE mmol/L 118* 116* 113*   CO2 mmol/L 18.0* 17.0* 17.0*   BUN mg/dL 25* 29* 31*   CREATININE mg/dL 0.92 0.96 1.09*   GLUCOSE mg/dL 69 80 102*   CALCIUM mg/dL 6.3* 5.8* 6.4*     Results from last 7 days   Lab Units 11/11/19  2140 11/11/19  1800 11/11/19  1341 11/05/19  1430   SODIUM mmol/L 144 141 141 147*   POTASSIUM mmol/L 4.6 4.7 5.3* 5.2   CHLORIDE mmol/L 118* 116* 113* 117*   CO2 mmol/L 18.0* 17.0* 17.0* 14.0*   BUN mg/dL 25* 29* 31* 74*   CREATININE mg/dL 0.92 0.96 1.09* 1.84*   CALCIUM mg/dL 6.3* 5.8* 6.4* 6.9*   BILIRUBIN mg/dL 1.0  --   --  2.2*   ALK PHOS U/L 65  --   --  170*   ALT (SGPT) U/L 63*  --   --  231*   AST (SGOT) U/L 43*  --   --  210*   GLUCOSE mg/dL 69 80 102* 159*     Results from last 7 days   Lab Units 11/11/19  1800 11/11/19  1341 11/05/19  1609   MAGNESIUM mg/dL 1.3* 1.5* 2.2     Lab Results   Component Value Date    CALCIUM 6.3 (L) 11/11/2019    PHOS 2.1 (L) 11/11/2019     No results found for: HGBA1C  Lab Results   Component Value Date    CHOL 168 05/22/2019    TRIG 236 (H) 05/22/2019    HDL 40 05/22/2019    LDL 90 05/22/2019     No results found for: LIPASE        Lab Results  "  Lab Value Date/Time    INTRAOP  06/18/2019 1200     Part 1: Received in formalin in container A designated \"Duodenum\" are a few fragments of tan tissue measuring up to 0.4 cm in greatest dimension, filtered and submitted in one cassette.    Part 2: Received in formalin in container B designated \"Body and antrum\" are a few fragments of tan tissue measuring up to 0.3 cm in greatest dimension, submitted in one cassette.          FINALDX  06/18/2019 1200     Specimen #1 (Duodenum, biopsy):    Benign duodenal mucosa with preserved villous architecture and no significant histopathology    Negative for celiac disease    Specimen #2 (Stomach, biopsy):    Metastatic clear cell renal cell carcinoma, see COMMENT    H. Pylori immunostain negative for Helicobacter organisms (control reacts appropriately)           COMDX  06/18/2019 1200     The patient's clinical history of metastatic renal cell carcinoma is noted. The current biopsy shows multiple fragments of reactive gastric mucosa with reactive gastropathy. A single tissue fragment displays infiltrative atypical cells with abundant clear cytoplasm. Cytokeratin AE1/3 stain was performed utilizing appropriate controls and these cells are positive, confirming a diagnosis of invasive carcinoma. Morphologically, these cells are consistent with clear cell renal cell carcinoma and are not suggestive of a poorly differrentiated adenocarcinoma. If further confirmatory testing is required, please forward an order to pathology.         Microbiology Results (last 10 days)     ** No results found for the last 240 hours. **          ECG/EMG Results (most recent)     Procedure Component Value Units Date/Time    ECG 12 Lead [[id number]] Collected:  11/11/19 1629     Updated:  11/11/19 1632    Narrative:       HEART RATE= 76  bpm  RR Interval= 792  ms  VT Interval= 148  ms  P Horizontal Axis= -20  deg  P Front Axis= 66  deg  QRSD Interval= 81  ms  QT Interval= 373  ms  QRS Axis= 35  deg  T " Wave Axis= 77  deg  - ABNORMAL ECG -  Sinus rhythm  Anterior infarct, old  Electronically Signed By:   Date and Time of Study: 2019-11-11 16:29:29          Results for orders placed during the hospital encounter of 07/09/19   Duplex Venous Upper Extremity - Left CAR    Narrative · Normal left upper extremity venous duplex scan.  · Incidental finding of mass in the left antecubital fossa that appears to   have some vascularity to it. Consider alternative imaging modality for   further characterization.               Xr Chest 1 View    Result Date: 11/11/2019  No acute cardiopulmonary process.  Electronically Signed By-Enedina Saini On:11/11/2019 5:58 PM This report was finalized on [id number] by  Enedina Saini, .        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       Estimated Creatinine Clearance: 48 mL/min (by C-G formula based on SCr of 0.92 mg/dL).    I personally reviewed patient's x-ray films and my findings are: 0    I personally reviewed patient's EKG strip and my findings are: 0    Plan    Calcium replacement  Hold phosphate supplementation, but this might be culprit preventing calcium absorption  .  Check vitamin D level and supplement empirically  Care coordination with nursing for current care.  Discussed with patient and significant other at bedside 11/11/19 9:02 PM    Disposition        Destination      No service coordination in this encounter.      Durable Medical Equipment      No service coordination in this encounter.      Dialysis/Infusion      No service coordination in this encounter.       Home Medical Care      No service coordination in this encounter.      Therapy      No service coordination in this encounter.      Community Resources      No service coordination in this encounter.          I discussed the patient's findings and my recommendations with patient and as above. 11/11/19 9:02 PM    Braydon Medina MD  11/11/19  10:37 PM

## 2019-11-12 NOTE — PROGRESS NOTES
Discharge Planning Assessment  [facility]     Patient Name: Caroline Truong  MRN: [id number]  Today's Date: 11/12/2019    Admit Date: 11/11/2019    Discharge Needs Assessment     Row Name 11/12/19 0220       Living Environment    Lives With  alone    Current Living Arrangements  home/apartment/condo    Primary Care Provided by  self    Provides Primary Care For  no one    Able to Return to Prior Arrangements  yes       Resource/Environmental Concerns    Resource/Environmental Concerns  none    Transportation Concerns  car, none       Transition Planning    Patient/Family Anticipates Transition to  home    Patient/Family Anticipated Services at Transition  none    Transportation Anticipated  family or friend will provide       Discharge Needs Assessment    Readmission Within the Last 30 Days  no previous admission in last 30 days    Concerns to be Addressed  no discharge needs identified;denies needs/concerns at this time    Equipment Currently Used at Home  cane, straight    Anticipated Changes Related to Illness  none    Equipment Needed After Discharge  none        Discharge Plan     Row Name 11/12/19 1004       Plan    Plan  Anticipate routine home.     Patient/Family in Agreement with Plan  yes    Plan Comments  Met with patient at bedside who reports no anticipated needs at discharge. Discharge pending MD rounds.     Row Name 11/12/19 1754       Plan    Final Discharge Disposition Code  01 - home or self-care             Expected Discharge Date and Time     Expected Discharge Date Expected Discharge Time    Nov 12, 2019         Demographic Summary     Row Name 11/12/19 1342       General Information    Admission Type  observation    Arrived From  home    Required Notices Provided  Observation Status Notice    Referral Source  admission list    Reason for Consult  discharge planning        Functional Status     Row Name 11/12/19 6852       Functional Status    Usual Activity Tolerance  good    Current Activity  Tolerance  good       Functional Status, IADL    Medications  independent    Meal Preparation  independent    Housekeeping  independent    Laundry  independent    Shopping  independent       Mental Status    General Appearance WDL  WDL       Mental Status Summary    Recent Changes in Mental Status/Cognitive Functioning  no changes        Janice Beasley  [phone]

## 2019-11-12 NOTE — PLAN OF CARE
Problem: Patient Care Overview  Goal: Plan of Care Review  Outcome: Ongoing (interventions implemented as appropriate)   11/12/19 8839   Coping/Psychosocial   Plan of Care Reviewed With patient   Plan of Care Review   Progress improving   OTHER   Outcome Summary Pt. admitted for hypocalcemia, 3x Ca runs given, Ca is now 7.3, one more run of Ca infusing now, will continue to monitor

## 2019-11-12 NOTE — DISCHARGE SUMMARY
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  No fever or chills, denies abdominal pains.  The patient reports no complaints at this time.  Patient is being treated with IV chemotherapy for bone cancer with kidney involvement.  Reports last diarrhea stool was yesterday.  Denies blaine blood.        Location:  Not applicable, no pain  Quality:  Timing:  Duration:   Aggravating:  Alleviating:     Onc:  Dev      Past Medical History:     Past Medical History:   Diagnosis Date   • Abnormal laboratory test 12/07/2018   • Anemia 12/11/2018   • Atrioventricular block, complete (CMS/HCC) 08/29/2014   • Bilateral leg edema 09/09/2014   • Bradycardia 08/29/2014   • Chest discomfort 08/24/2014   • Clear cell carcinoma of kidney (CMS/HCC) 01/28/2019   • Deep venous thrombosis of arm (CMS/HCC) 11/19/2018   • Diabetes mellitus (CMS/HCC) 10/22/2018   • Dyspnea 10/15/2018   • Dyspnea 08/29/2014   • Fatigue 10/15/2018   • Hypertension 08/29/2014   • Localized swelling, left upper limb 11/16/2018   • Osteopenia 10/30/2018   •  Overweight 10/15/2018   • Pacemaker 09/30/2014    permanent   • Postmenopausal status 10/15/2018   • Preop examination 11/30/2018   • Renal insufficiency 12/11/2018       Past Surgical History:     Past Surgical History:   Procedure Laterality Date   • CARDIAC CATHETERIZATION  03/14/2017   • PACEMAKER IMPLANTATION  09/18/2014    Dual chamber- BS   • PACEMAKER IMPLANTATION     • TOTAL ABDOMINAL HYSTERECTOMY  2000       Social History:   Social History     Socioeconomic History   • Marital status:      Spouse name: Not on file   • Number of children: Not on file   • Years of education: Not on file   • Highest education level: Not on file   Tobacco Use   • Smoking status: Never Smoker   • Smokeless tobacco: Never Used   Substance and Sexual Activity   • Alcohol use: No     Frequency: Never   • Drug use: No   • Sexual activity: No       Vital Signs  Temp:  [97.3 °F (36.3 °C)-98.1 °F (36.7 °C)] 97.9 °F (36.6 °C)  Heart Rate:  [73-76] 73  Resp:  [16-18] 18  BP: ()/(55-76) 129/76    Physical Exam:  Physical Exam   Constitutional: She is oriented to person, place, and time. She appears well-developed and well-nourished. No distress.   HENT:   Head: Normocephalic and atraumatic.   Right Ear: External ear normal.   Left Ear: External ear normal.   Nose: Nose normal.   Mouth/Throat: Oropharynx is clear and moist. No oropharyngeal exudate.   Eyes: Conjunctivae and EOM are normal. Pupils are equal, round, and reactive to light. Right eye exhibits no discharge. Left eye exhibits no discharge. No scleral icterus.   Neck: Normal range of motion. No JVD present. No tracheal deviation present. No thyromegaly present.   Cardiovascular: Normal rate, regular rhythm, normal heart sounds and intact distal pulses. Exam reveals no gallop and no friction rub.   No murmur heard.  Pulmonary/Chest: Effort normal and breath sounds normal. No stridor. No respiratory distress. She has no wheezes. She has no rales. She exhibits no  tenderness.   Right upper chest [id number] present  Left upper chest permanent pacemaker present   Abdominal: Soft. Bowel sounds are normal. She exhibits no distension and no mass. There is no tenderness. There is no rebound and no guarding. No hernia.   Midline scar from unknown surgery.   Musculoskeletal: Normal range of motion. She exhibits no edema, tenderness or deformity.   Left upper extremity brace secondary to metastasis and bones   Lymphadenopathy:     She has no cervical adenopathy.   Neurological: She is alert and oriented to person, place, and time. No cranial nerve deficit or sensory deficit. She exhibits normal muscle tone. Coordination normal.   Skin: Skin is warm and dry. No rash noted. She is not diaphoretic. No erythema.   Psychiatric: She has a normal mood and affect. Her behavior is normal.   Nursing note and vitals reviewed.              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    Discharge Disposition    Destination      No service coordination in this encounter.      Durable Medical Equipment      No service coordination in this encounter.      Dialysis/Infusion      No service coordination in this encounter.      Home Medical Care      No service coordination in this encounter.      Therapy      No service coordination in this encounter.      Community Resources      No  service coordination in this encounter.              PT Recommendation and Plan          Home or Self Care           Discharge Medications      New Medications      Instructions Start Date   cyanocobalamin 1000 MCG tablet  Commonly known as:  VITAMIN B-12  Replaces:  B-12 COMPLIANCE INJECTION 1000 MCG/ML kit   1,000 mcg, Oral, Daily   Start Date:  11/13/2019     vitamin D3 125 MCG (5000 UT) capsule capsule   5,000 Units, Oral, Daily   Start Date:  11/13/2019        Changes to Medications      Instructions Start Date   calcium carbonate 500 MG chewable tablet  Commonly known as:  TUMS  What changed:  how much to take   2 tablets, Oral, 2 Times Daily      predniSONE 10 MG tablet  Commonly known as:  DELTASONE  What changed:    · how much to take  · additional instructions   10 mg, Oral, Take As Directed, 30mg daily x1 wk; 20mg daily x1 wk; 10mg daily x1 wk; 5mg daily x1 wk; 5mg every other day x 3doses         Continue These Medications      Instructions Start Date   carvedilol 3.125 MG tablet  Commonly known as:  COREG   3.125 mg, Oral, 2 Times Daily With Meals      diphenoxylate-atropine 2.5-0.025 MG per tablet  Commonly known as:  LOMOTIL   1 tablet, Oral, 4 Times Daily PRN      fenofibrate 160 MG tablet   160 mg, Oral, Daily      hydrALAZINE 100 MG tablet  Commonly known as:  APRESOLINE   100 mg, Oral, 3 Times Daily      Insulin Glargine 100 UNIT/ML injection pen  Commonly known as:  LANTUS SOLOSTAR   7 Units, Subcutaneous, Daily      lidocaine-prilocaine 2.5-2.5 % cream  Commonly known as:  EMLA   APPLY TO AFFECTED AREA(S) AROUND PORT 30-60 MINUTES PRIOR TO PORT USE      lisinopril 5 MG tablet  Commonly known as:  PRINIVIL,ZESTRIL   5 mg, Oral, 2 Times Daily      metoclopramide 10 MG tablet  Commonly known as:  REGLAN   10 mg, Oral, 4 Times Daily Before Meals & Nightly      ondansetron 8 MG tablet  Commonly known as:  ZOFRAN   Oral, Every 8 Hours PRN      pantoprazole 20 MG EC tablet  Commonly known as:   PROTONIX   20 mg, Oral, Daily      potassium chloride 10 MEQ CR tablet  Commonly known as:  K-DUR,KLOR-CON   10 mEq, Oral, Daily      rivaroxaban 20 MG tablet  Commonly known as:  XARELTO   20 mg, Oral, Daily      Zinc Gluconate 50 MG capsule   50 mg, Oral, Daily         Stop These Medications    B-12 COMPLIANCE INJECTION 1000 MCG/ML kit  Generic drug:  Cyanocobalamin  Replaced by:  cyanocobalamin 1000 MCG tablet     vitamin C 500 MG tablet  Commonly known as:  ASCORBIC ACID                Consults:   Consults     Date and Time Order Name Status Description    11/11/2019 1632 Hospitalist (on-call MD unless specified) Completed           Procedures Performed:         Pertinent Test Results:     I reviewed the patient's new clinical results.    Results from last 7 days   Lab Units 11/12/19  0535 11/11/19  1800 11/11/19  1341   WBC 10*3/mm3 5.00 7.80 8.18   HEMOGLOBIN g/dL 11.6* 13.1 13.7   HEMATOCRIT % 36.1 40.6 42.3   PLATELETS 10*3/mm3 170 198 210     Results from last 7 days   Lab Units 11/12/19  0535 11/12/19  0137 11/11/19  2140   SODIUM mmol/L 139 141 144   POTASSIUM mmol/L 5.0 4.6 4.6   CHLORIDE mmol/L 114* 115* 118*   CO2 mmol/L 18.0* 18.0* 18.0*   BUN mg/dL 21 24* 25*   CREATININE mg/dL 0.79 0.83 0.92   GLUCOSE mg/dL 116* 79 69   CALCIUM mg/dL 8.2* 7.3* 6.3*     Results from last 7 days   Lab Units 11/12/19  0535 11/12/19  0137 11/11/19  2140   SODIUM mmol/L 139 141 144   POTASSIUM mmol/L 5.0 4.6 4.6   CHLORIDE mmol/L 114* 115* 118*   CO2 mmol/L 18.0* 18.0* 18.0*   BUN mg/dL 21 24* 25*   CREATININE mg/dL 0.79 0.83 0.92   CALCIUM mg/dL 8.2* 7.3* 6.3*   BILIRUBIN mg/dL 1.0 1.0 1.0   ALK PHOS U/L 65 65 65   ALT (SGPT) U/L 63* 63* 63*   AST (SGOT) U/L 43* 44* 43*   GLUCOSE mg/dL 116* 79 69     Results from last 7 days   Lab Units 11/11/19  1800 11/11/19  1341 11/05/19  1609   MAGNESIUM mg/dL 1.3* 1.5* 2.2     Lab Results   Component Value Date    CALCIUM 8.2 (L) 11/12/2019    PHOS 2.1 (L) 11/11/2019     No  "results found for: HGBA1C  Lab Results   Component Value Date    CHOL 168 05/22/2019    TRIG 236 (H) 05/22/2019    HDL 40 05/22/2019    LDL 90 05/22/2019     No results found for: LIPASE        Lab Results   Lab Value Date/Time    INTRAOP  06/18/2019 1200     Part 1: Received in formalin in container A designated \"Duodenum\" are a few fragments of tan tissue measuring up to 0.4 cm in greatest dimension, filtered and submitted in one cassette.    Part 2: Received in formalin in container B designated \"Body and antrum\" are a few fragments of tan tissue measuring up to 0.3 cm in greatest dimension, submitted in one cassette.          FINALDX  06/18/2019 1200     Specimen #1 (Duodenum, biopsy):    Benign duodenal mucosa with preserved villous architecture and no significant histopathology    Negative for celiac disease    Specimen #2 (Stomach, biopsy):    Metastatic clear cell renal cell carcinoma, see COMMENT    H. Pylori immunostain negative for Helicobacter organisms (control reacts appropriately)           COMDX  06/18/2019 1200     The patient's clinical history of metastatic renal cell carcinoma is noted. The current biopsy shows multiple fragments of reactive gastric mucosa with reactive gastropathy. A single tissue fragment displays infiltrative atypical cells with abundant clear cytoplasm. Cytokeratin AE1/3 stain was performed utilizing appropriate controls and these cells are positive, confirming a diagnosis of invasive carcinoma. Morphologically, these cells are consistent with clear cell renal cell carcinoma and are not suggestive of a poorly differrentiated adenocarcinoma. If further confirmatory testing is required, please forward an order to pathology.         Microbiology Results (last 10 days)     ** No results found for the last 240 hours. **          ECG/EMG Results (most recent)     Procedure Component Value Units Date/Time    ECG 12 Lead [[id number]] Collected:  11/11/19 1629     Updated:  11/11/19 " 1632    Narrative:       HEART RATE= 76  bpm  RR Interval= 792  ms  WA Interval= 148  ms  P Horizontal Axis= -20  deg  P Front Axis= 66  deg  QRSD Interval= 81  ms  QT Interval= 373  ms  QRS Axis= 35  deg  T Wave Axis= 77  deg  - ABNORMAL ECG -  Sinus rhythm  Anterior infarct, old  Electronically Signed By:   Date and Time of Study: 2019-11-11 16:29:29          Results for orders placed during the hospital encounter of 07/09/19   Duplex Venous Upper Extremity - Left CAR    Narrative · Normal left upper extremity venous duplex scan.  · Incidental finding of mass in the left antecubital fossa that appears to   have some vascularity to it. Consider alternative imaging modality for   further characterization.               Xr Chest 1 View    Result Date: 11/11/2019  No acute cardiopulmonary process.  Electronically Signed By-Enedina Saini On:11/11/2019 5:58 PM This report was finalized on [id number] by  Enedina Saini, .      Xrays, labs reviewed personally by physician.    Condition on Discharge:    Stable    Discharge Diet:   Dietary Orders (From admission, onward)    Start     Ordered    11/11/19 2016  Diet Diabetic/Consistent Carbs; Diabetic - Consistent Carb  Diet Effective Now     Question Answer Comment   Diet / Texture / Consistency Diabetic/Consistent Carbs    Select Type: Diabetic - Consistent Carb        11/11/19 2015          Activity at Discharge:   Activity Instructions     Activity as Tolerated            Follow-up Appointments  Future Appointments   Date Time Provider Department Center   11/25/2019  1:00 PM INF ROOM 28 - CHAIR A  [facility] CC [facility]   12/3/2019  1:30 PM Franklin García MD [facility] ONC NA None   12/3/2019  1:35 PM LAB MD [facility] ONC LAB NA [facility] None   7/14/2020  1:30 PM DONTE Lam MD [facility] CAR NA P [facility] NA   7/14/2020  1:45 PM [facility] CARDIOLOGY NA [facility] CAR NA P [facility] NA     Additional Instructions for the Follow-ups that You Need to Schedule     Call MD With Problems  / Concerns   As directed      Instructions: Call [phone] or email [email or web address] for problems or concerns.    Order Comments:  Instructions: Call [phone] or email [email or web address] for problems or concerns.          Discharge Follow-up with PCP   As directed       Currently Documented PCP:    Mercedez Dallas MD    PCP Phone Number:    [phone]     Follow Up Details:  Follow up with PCP within one week               Test Results Pending at Discharge   Order Current Status    Vitamin B1, Whole Blood In process    Vitamin B6 In process    Vitamin D 1,25 Dihydroxy In process           Risk for Readmission (LACE) Score: 10 (11/12/2019  6:00 AM)          Braydon Medina MD  11/12/19  9:49 AM    Time: Greater than 30 minutes in discharge activity for care coordination with the laboratory and nursing.  Vitamin D level still pending till then I am going to leave her on 5000 units/day.  I would hold off phosphate supplementation for reasons I have mentioned in history and physical.

## 2019-11-18 NOTE — TELEPHONE ENCOUNTER
Case Management/ Note    Patient Name: Caroline Truong  YOB: 1945  MRN #: [id number]    OSW called patient to inform her that she is the recipient of a Thanksgiving basket. She gave v/u. She needs to reschedule her 11/25 appointment to 12/2; a note was sent to April .     Electronically signed by:   Luz Maria Ulrich LCSW, OSW-C  11/18/19, 9:52 AM

## 2019-11-19 NOTE — TELEPHONE ENCOUNTER
She is taking the sterids and is on them once daily now. The diarrhea is not any better. She is going between 5-8 times a day.

## 2019-11-19 NOTE — TELEPHONE ENCOUNTER
It looks like Dr. Fenton started this patient on steroids for immune mediated colitis on 11/4/2019.  Is her diarrhea not improving?  I do not want to refill Lomotil and potentially mask an issue.  Find out if she is still taking the steroids as were ordered?

## 2019-11-25 NOTE — TELEPHONE ENCOUNTER
Patient states this week she takes one steroid daily.  Her diarrhea is somewhat better but is needing something stronger.  Chart reviewed, Lomotil already eprescribed today.  Patient notified and v/u. YUSRA/hiram gillette

## 2019-12-02 NOTE — TELEPHONE ENCOUNTER
Pt called stating that she was suppose to have a prescription called in, but the pharmacy has not received it yet. Chart reviewed and discussed with HOMERO Pimentel. Called Prednisone script to [facility] pharmacy.

## 2019-12-02 NOTE — PROGRESS NOTES
"Patient is here to restart her Opdivo. She had been taking prednisone for possible colitis. She only has 4 days left of her steroids and has weaned her dose down as directed. Yesterday the diarrhea worsened again and the patient feels like she is \"back to square one.\" She states she went every hour for 6 hours straight in the middle of the night. She is also taking imodium and lomotil. I talked to Loren Ledezma NP and she discussed it with Dr. Fenton and decided to hold the Opdivo and restart the higher dose of steroids. Patient sees Dr. Fenton tomorrow. I sent in a refill for her lomotil. We emory a CMP today and will make sure her calcium comes back normal before giving her the xgeva and she had been very hypocalcemic last time she was here. I asked April Angel to schedule her xgeva following her appt with Dr. Fenton tomorrow as long as her calcium is good.   Dr. Fenton also wants to test the patient for Cdiff so I sent her home with a specimen cup and instructions to refrigerate the specimen and bring back with her tomorrow. I asked the patient if she felt like she needed fluids and she said no. She has been pushing PO fluids and her vitals are fine. I told her to let Dr. Fenton know tomorrow if she needed IVFs.     "

## 2019-12-02 NOTE — TELEPHONE ENCOUNTER
Called pt and notified her that Prednisone was called to [facility] pharmacy and she v/u. mh

## 2019-12-02 NOTE — ADDENDUM NOTE
Encounter addended by: Yany Roman RN on: 12/2/2019 4:18 PM   Actions taken: Order Reconciliation Section accessed

## 2019-12-05 NOTE — PROGRESS NOTES
Hematology/Oncology Outpatient Follow Up    Caroline Truong  1945    Primary Care Physician: Mercedez Dallas MD  Referring Physician: Mercedez Dallas*  Chief complaint:  Metastatic renal cell carcinoma.   Bone mets.    Left upper extremity unprovoked DVT.   History of Present Illness:   · Ms. Truong does not have a personal or family history of blood clotting disorder.  She reports about four weeks prior to presentation, she noticed swelling in her left arm. Slowly it was getting bigger and pain started, and she sought medical attention.    · 11/16/18 - Doppler ultrasound of the left upper extremity was obtained which revealed acute DVT involving the left subclavian vein, axillary vein and brachial vein. Superficial venous thrombosis involving the left basilic vein.    · 11/30/18 - VQ scan was obtained, which was low probability of PE.    · Patient was sent to the [facility] and seen initially on 12/5/18.  · 12/11/18 - CT scan of the chest, abdomen and pelvis done without contrast secondary to renal insufficiency showed multiple small pulmonary nodules.  Largest measuring 5 mm within the right lower lobe, indeterminate, but may represent small pulmonary metastasis.  Follow up recommended.  Enlarged AP window lymph node 1.4 x 2.8 cm.  Pathological fractures involving the left 1st rib and right 7th rib with underlying lytic lesions suspicious for osseous metastatic disease or myeloma.     · Exophytic lobular intermediate density lesion arising from the lower pole of the right kidney, suspicious for a primary neoplasm.  Evaluation limited due to lack of IV contrast.  Lytic lesions involving the posteromedial right acetabular wall and left iliac vein suspicious for osseous metastatic disease or myeloma.     · 12/21/18 - Tira-lambda free light chain ratio 0.98.  Serum electrophoresis normal. Serum immunofixation normal.  Urine immunofixation normal.  B12 495.  Beta 2  microglobulin 1.36.  Immunoglobulin G and M normal.  Haptoglobin 157 normal.  Ferritin 138.  Folic acid 13.1.  Creatinine 1.5.  Retic count 1.26.  Hemoccult negative x3.    · Patient was continued on indefinite Xarelto for her DVT.   2.   Oncologic History:    · 1/11/19 - Bone scan showed uptake within the posterior left 1st rib, posterior right 7th rib, L1 vertebral body and left iliac crest consistent with known lytic lesions seen on CT of 12/11/18 Consistent with metastatic disease.    · 1/24/19 - CT-guided biopsy of the left iliac crest positive for metastatic clear cell renal cell carcinoma.    · 2/4/19 - PET/CT scan showed hypermetabolic 2.3 x 4.6 cm right renal mass most consistent with renal cell carcinoma.  An adjacent 12 mm right for renal soft tissue nodule consistent with metastatic nodule.  Right retroperitoneal hypermetabolic lymphadenopathy.  Multiple small pulmonary nodules worrisome for hematogenous pulmonary metastases.  Multiple lytic metastases in the chest, abdomen and pelvis which have developed or enlarged since December 2018.   · 2/18/19 - Patient started Votrient 800 mg to take once a day.   · 3/4/19 - CT scan of the chest with PE protocol.  No evidence of PE.  Worsening of metastatic disease, but no definite new lesions.  No fractures.  Stable tiny bilateral pulmonary nodules.    · 3/11/19 - Patient’s chemotherapy was switched to weekly Torisel at 25 mg (Votrient was stopped secondary to tumor pain).   · 5/28/19 - PET/CT scan showed overall appears to be improvement from prior exam, specifically multiple pulmonary nodules have improved have prior study and mediastinal lymphadenopathy has improved.  There appears to be interval central necrosis involving an osseous metastasis along the left iliac vein.  4.1 cm right renal mass demonstrates suggestion of some central necrosis.  A previously described lymph node anterior to the psoas muscle is no longer well visualized.  A right  retroperitoneal FDG avid lymph node and an FDG avid soft tissue nodule in the rightpararenal space appears stable.    · 7/23/2019 - CT of upper extremity left without contrast-1. Destructive lesion present within the distal humerus as well as the proximal ulna consistent with metastatic disease. 2. Osseous metastasis within the bilateral ribs and spine as above.  · 3. Small bilateral pleural effusions with presence of cardiomegaly. Patchy airspace disease is present within the lung bases bilaterally which may be partially related to atelectasis and/or pneumonia.Evaluation limited due to respiratory motion.   · 7/28/2019 patient was initiated on external beam radiation therapy for palliative measures to control pain on her left elbow where there was destructive bone lesion in the distal humerus as well as proximal ulna consistent with metastatic disease.  30 Gy in 10 fractions  · 8/19/2019 secondary to progression of disease chemotherapy was switched to nivolumab and ipilimumab    Past Medical History:   Diagnosis Date   • Abnormal laboratory test 12/07/2018   • Anemia 12/11/2018   • Atrioventricular block, complete (CMS/HCC) 08/29/2014   • Bilateral leg edema 09/09/2014   • Bradycardia 08/29/2014   • Chest discomfort 08/24/2014   • Clear cell carcinoma of kidney (CMS/HCC) 01/28/2019   • Deep venous thrombosis of arm (CMS/HCC) 11/19/2018   • Diabetes mellitus (CMS/HCC) 10/22/2018   • Dyspnea 10/15/2018   • Dyspnea 08/29/2014   • Fatigue 10/15/2018   • Hypertension 08/29/2014   • Localized swelling, left upper limb 11/16/2018   • Osteopenia 10/30/2018   • Overweight 10/15/2018   • Pacemaker 09/30/2014    permanent   • Postmenopausal status 10/15/2018   • Preop examination 11/30/2018   • Renal insufficiency 12/11/2018       Past Surgical History:   Procedure Laterality Date   • CARDIAC CATHETERIZATION  03/14/2017   • PACEMAKER IMPLANTATION  09/18/2014    Dual chamber- BS   • PACEMAKER IMPLANTATION     • TOTAL ABDOMINAL  HYSTERECTOMY  2000         Current Outpatient Medications:   •  carvedilol (COREG) 3.125 MG tablet, Take 3.125 mg by mouth 2 (Two) Times a Day With Meals., Disp: , Rfl:   •  Cholecalciferol (VITAMIN D3) 125 MCG (5000 UT) capsule capsule, Take 1 capsule by mouth Daily., Disp: 30 capsule, Rfl: 0  •  diphenoxylate-atropine (LOMOTIL) 2.5-0.025 MG per tablet, Take 1 tablet by mouth 4 (Four) Times a Day As Needed for Diarrhea., Disp: 30 tablet, Rfl: 0  •  fenofibrate 160 MG tablet, Take 160 mg by mouth Daily., Disp: , Rfl:   •  hydrALAZINE (APRESOLINE) 100 MG tablet, Take 100 mg by mouth 3 (Three) Times a Day., Disp: , Rfl:   •  Insulin Glargine (LANTUS SOLOSTAR) 100 UNIT/ML injection pen, Inject 7 Units under the skin into the appropriate area as directed Daily., Disp: 15 pen, Rfl: 0  •  lidocaine-prilocaine (EMLA) 2.5-2.5 % cream, APPLY TO AFFECTED AREA(S) AROUND PORT 30-60 MINUTES PRIOR TO PORT USE, Disp: 1 each, Rfl: 2  •  lisinopril (PRINIVIL,ZESTRIL) 5 MG tablet, Take 5 mg by mouth 2 (Two) Times a Day., Disp: , Rfl:   •  metoclopramide (REGLAN) 10 MG tablet, Take 10 mg by mouth 4 (Four) Times a Day Before Meals & at Bedtime., Disp: , Rfl:   •  ondansetron (ZOFRAN) 8 MG tablet, TAKE ONE TABLET BY MOUTH EVERY 8 HOURS AS NEEDED FOR NAUSEA AND VOMITING, Disp: 20 tablet, Rfl: 3  •  pantoprazole (PROTONIX) 20 MG EC tablet, Take 1 tablet by mouth Daily., Disp: 30 tablet, Rfl: 3  •  potassium chloride (K-DUR,KLOR-CON) 10 MEQ CR tablet, Take 1 tablet by mouth Daily., Disp: 30 tablet, Rfl: 3  •  predniSONE (DELTASONE) 10 MG tablet, Take 3 tablets by mouth 2 (Two) Times a Day. Or as directed by MD, Disp: 180 tablet, Rfl: 0  •  rivaroxaban (XARELTO) 20 MG tablet, Take 20 mg by mouth Daily., Disp: , Rfl:   •  vitamin B-12 (VITAMIN B-12) 1000 MCG tablet, Take 1 tablet by mouth Daily., Disp: 30 tablet, Rfl: 0  •  XARELTO 20 MG tablet, TAKE ONE TABLET BY MOUTH DAILY, Disp: 30 tablet, Rfl: 3  •  Zinc Gluconate 50 MG capsule, Take 50  "mg by mouth Daily., Disp: , Rfl:   •  calcium carbonate (TUMS) 500 MG chewable tablet, Chew 1,000 mg 3 (Three) Times a Day., Disp: 180 tablet, Rfl: 2  No current facility-administered medications for this visit.     Allergies   Allergen Reactions   • Levaquin [Levofloxacin] Delirium       Family History   Problem Relation Age of Onset   • Hypertension Mother        Cancer-related family history is not on file.    Social History     Tobacco Use   • Smoking status: Never Smoker   • Smokeless tobacco: Never Used   Substance Use Topics   • Alcohol use: No     Frequency: Never   • Drug use: No       I have reviewed the history of present illness, past medical history, family history, social history, lab results, all notes and other records since the patient was last seen on 9/17/2019    Subjective     Patient is my office for follow-up accompanied by her friend.  Patient diarrhea has improved but restarted again.  She is slowly improving now.  Back on the steroids  ROS:       Review of Systems   Constitutional: Negative for fever.   HENT: Negative for nosebleeds and trouble swallowing.    Eyes: Negative for visual disturbance.   Respiratory: Negative for cough, shortness of breath and wheezing.    Cardiovascular: Negative for chest pain.   Gastrointestinal: Negative for abdominal pain and blood in stool.   Endocrine: Negative for cold intolerance.   Genitourinary: Negative for dysuria and hematuria.   Musculoskeletal: Negative for joint swelling.   Skin: Negative for rash.   Allergic/Immunologic: Negative for environmental allergies.   Neurological: Negative for seizures.   Hematological: Does not bruise/bleed easily.   Psychiatric/Behavioral: The patient is not nervous/anxious.          Objective:       Vitals:    12/03/19 1319   BP: 129/76   Pulse: 82   Resp: 16   Temp: Comment: unable to obtian   Weight: 60.3 kg (133 lb)   Height: 170.2 cm (67\")   PainSc: 0-No pain         Physical Exam   Constitutional: She is " oriented to person, place, and time. No distress.   HENT:   Head: Normocephalic and atraumatic.   Eyes: Conjunctivae and EOM are normal. Right eye exhibits no discharge. Left eye exhibits no discharge. No scleral icterus.   Neck: Trachea normal and normal range of motion. Neck supple. No thyromegaly present.   Cardiovascular: Normal rate, regular rhythm, S1 normal, S2 normal and normal heart sounds. Exam reveals no gallop and no friction rub.   Pulmonary/Chest: Effort normal and breath sounds normal. No stridor. No respiratory distress. She has no wheezes.   Abdominal: Soft. Bowel sounds are normal. She exhibits no mass. There is no hepatosplenomegaly. There is no tenderness. There is no rebound and no guarding.   Musculoskeletal: Normal range of motion. She exhibits no tenderness.   Left upper extremity swollen, swelling and tenderness of the left elbow, pulses palpable       Lymphadenopathy:     She has no cervical adenopathy.   Neurological: She is alert and oriented to person, place, and time. She has normal strength. She exhibits normal muscle tone.   Skin: Skin is warm and dry. No rash noted. She is not diaphoretic. No erythema.   Psychiatric: She has a normal mood and affect. Her speech is normal and behavior is normal.   Nursing note and vitals reviewed.    Left upper extremity swelling  RECENT LABS:     WBC   Date Value Ref Range Status   12/02/2019 5.81 3.40 - 10.80 10*3/mm3 Final     RBC   Date Value Ref Range Status   12/02/2019 3.99 3.77 - 5.28 10*6/mm3 Final     Hemoglobin   Date Value Ref Range Status   12/02/2019 11.9 (L) 12.0 - 15.9 g/dL Final     Hematocrit   Date Value Ref Range Status   12/02/2019 35.0 34.0 - 46.6 % Final     MCV   Date Value Ref Range Status   12/02/2019 87.7 79.0 - 97.0 fL Final     MCH   Date Value Ref Range Status   12/02/2019 29.8 26.6 - 33.0 pg Final     MCHC   Date Value Ref Range Status   12/02/2019 34.0 31.5 - 35.7 g/dL Final     RDW   Date Value Ref Range Status    12/02/2019 16.0 (H) 12.3 - 15.4 % Final     RDW-SD   Date Value Ref Range Status   12/02/2019 50.8 37.0 - 54.0 fl Final     MPV   Date Value Ref Range Status   12/02/2019 9.1 6.0 - 12.0 fL Final     Platelets   Date Value Ref Range Status   12/02/2019 217 140 - 450 10*3/mm3 Final     Neutrophil %   Date Value Ref Range Status   12/02/2019 91.0 (H) 42.7 - 76.0 % Final     Lymphocyte %   Date Value Ref Range Status   12/02/2019 5.2 (L) 19.6 - 45.3 % Final     Monocyte %   Date Value Ref Range Status   12/02/2019 3.1 (L) 5.0 - 12.0 % Final     Eosinophil %   Date Value Ref Range Status   12/02/2019 0.5 0.3 - 6.2 % Final     Basophil %   Date Value Ref Range Status   12/02/2019 0.2 0.0 - 1.5 % Final     Neutrophils, Absolute   Date Value Ref Range Status   12/02/2019 5.29 1.70 - 7.00 10*3/mm3 Final     Lymphocytes, Absolute   Date Value Ref Range Status   12/02/2019 0.30 (L) 0.70 - 3.10 10*3/mm3 Final     Monocytes, Absolute   Date Value Ref Range Status   12/02/2019 0.18 0.10 - 0.90 10*3/mm3 Final     Eosinophils, Absolute   Date Value Ref Range Status   12/02/2019 0.03 0.00 - 0.40 10*3/mm3 Final     Basophils, Absolute   Date Value Ref Range Status   12/02/2019 0.01 0.00 - 0.20 10*3/mm3 Final     nRBC   Date Value Ref Range Status   11/12/2019 0.0 0.0 - 0.2 /100 WBC Final       Lab Results   Component Value Date    GLUCOSE 117 (H) 12/02/2019    BUN 16 12/02/2019    CREATININE 0.74 12/02/2019    EGFRIFNONA 77 12/02/2019    EGFRIFAFRI 34 (L) 05/31/2019    BCR 21.6 12/02/2019    K 3.6 12/02/2019    CO2 20.0 (L) 12/02/2019    CALCIUM 7.5 (L) 12/02/2019    ALBUMIN 3.00 (L) 12/02/2019    LABIL2 1.1 06/10/2019    AST 50 (H) 12/02/2019    ALT 70 (H) 12/02/2019         Assessment/Plan      ASSESSMENT:   1. Metastatic renal cell carcinoma  2. Left upper extremity unprovoked DVT  3. Bone metastatic disease  4. Destructive bone lesion in the left distal humerus and proximal ulna  5. Type 2 diabetes  6. Chemotherapy-induced  diarrhea  7. ECOG 1      PLAN:      1. Patient received 4 cycles of chemotherapy with Ipi and Opdivo.   patient developed severe diarrhea related to Ipi.  She developed grade 3 colitis.  I continue steroids.    2. Will initiate Opdivo in about 4 weeks time hopefully the diarrhea will be resolved by the time   3.  Patient is on Xarelto it was restarted by Dr. Galo after the hematuria was resolved   4. Continue the fentanyl patch and Lortab as needed  5. Continue with Xgeva.  Patient will be continued on calcium replacement with Tums  6. Patient has a device on her left elbow which is helping her preventing fractures  7. Diabetic foot ulcer  healed up.  8. Hemoglobin is normal no further weekly CBC.  9. Continue calcium replacement with Tums  10. Stop Reglan and magnesium  11. I will see her back in my office in 2 weeks recheck CBC CMP at that time.    Stop weekly CBC  I have reviewed labs results, imaging, vitals, and medications with the patient and her friend  today.      Patient and her friend verbalized understanding and is in agreement of the above plans.     This report was compiled using Dragon voice recognition software. I have made every effort to proof read this document; however, typographical errors may persist

## 2019-12-09 NOTE — TELEPHONE ENCOUNTER
The pt called and states that she had severe diarrhea that was treated with Prednisone 30mg TID. The diarrhea stopped about 4 days ago and she wants to stop her prednisone.     I spoke with Dr. Fenton. She gave order to start prednisone 60mg for 4days, then 20mg for 4 days then 10mg for four days then stop. The pt wrote this down and repeated it back to me for VU.

## 2020-01-01 ENCOUNTER — TELEPHONE (OUTPATIENT)
Dept: ONCOLOGY | Facility: HOSPITAL | Age: 75
End: 2020-01-01

## 2020-01-01 ENCOUNTER — APPOINTMENT (OUTPATIENT)
Dept: ONCOLOGY | Facility: HOSPITAL | Age: 75
End: 2020-01-01

## 2020-01-01 ENCOUNTER — TELEPHONE (OUTPATIENT)
Dept: ONCOLOGY | Facility: CLINIC | Age: 75
End: 2020-01-01

## 2020-01-01 ENCOUNTER — CLINICAL SUPPORT NO REQUIREMENTS (OUTPATIENT)
Dept: CARDIOLOGY | Facility: CLINIC | Age: 75
End: 2020-01-01

## 2020-01-01 ENCOUNTER — LAB (OUTPATIENT)
Dept: LAB | Facility: HOSPITAL | Age: 75
End: 2020-01-01

## 2020-01-01 ENCOUNTER — HOSPITAL ENCOUNTER (OUTPATIENT)
Dept: ONCOLOGY | Facility: HOSPITAL | Age: 75
Setting detail: INFUSION SERIES
Discharge: HOME | End: 2020-01-06

## 2020-01-01 ENCOUNTER — OFFICE VISIT (OUTPATIENT)
Dept: ONCOLOGY | Facility: CLINIC | Age: 75
End: 2020-01-01

## 2020-01-01 ENCOUNTER — HOSPITAL ENCOUNTER (OUTPATIENT)
Dept: ONCOLOGY | Facility: HOSPITAL | Age: 75
Discharge: HOME | End: 2020-02-10
Admitting: INTERNAL MEDICINE

## 2020-01-01 ENCOUNTER — APPOINTMENT (OUTPATIENT)
Dept: LAB | Facility: HOSPITAL | Age: 75
End: 2020-01-01

## 2020-01-01 ENCOUNTER — HOSPITAL ENCOUNTER (OUTPATIENT)
Dept: ONCOLOGY | Facility: HOSPITAL | Age: 75
Discharge: HOME | End: 2020-03-02
Admitting: NURSE PRACTITIONER

## 2020-01-01 ENCOUNTER — DOCUMENTATION (OUTPATIENT)
Dept: ONCOLOGY | Facility: CLINIC | Age: 75
End: 2020-01-01

## 2020-01-01 ENCOUNTER — APPOINTMENT (OUTPATIENT)
Dept: GENERAL RADIOLOGY | Facility: HOSPITAL | Age: 75
End: 2020-01-01

## 2020-01-01 ENCOUNTER — HOSPITAL ENCOUNTER (INPATIENT)
Facility: HOSPITAL | Age: 75
LOS: 1 days | End: 2020-03-15
Attending: EMERGENCY MEDICINE | Admitting: INTERNAL MEDICINE

## 2020-01-01 ENCOUNTER — HOSPITAL ENCOUNTER (OUTPATIENT)
Dept: ONCOLOGY | Facility: HOSPITAL | Age: 75
Discharge: HOME | End: 2020-02-04
Admitting: INTERNAL MEDICINE

## 2020-01-01 ENCOUNTER — HOSPITAL ENCOUNTER (OUTPATIENT)
Dept: ONCOLOGY | Facility: HOSPITAL | Age: 75
Setting detail: INFUSION SERIES
Discharge: HOME | End: 2020-03-09

## 2020-01-01 ENCOUNTER — HOSPITAL ENCOUNTER (OUTPATIENT)
Dept: PET IMAGING | Facility: HOSPITAL | Age: 75
Discharge: HOME | End: 2020-03-06
Admitting: NURSE PRACTITIONER

## 2020-01-01 ENCOUNTER — HOSPITAL ENCOUNTER (EMERGENCY)
Facility: HOSPITAL | Age: 75
Discharge: HOME | End: 2020-03-14
Attending: EMERGENCY MEDICINE | Admitting: EMERGENCY MEDICINE

## 2020-01-01 ENCOUNTER — DOCUMENTATION (OUTPATIENT)
Dept: ONCOLOGY | Facility: HOSPITAL | Age: 75
End: 2020-01-01

## 2020-01-01 ENCOUNTER — HOSPITAL ENCOUNTER (OUTPATIENT)
Dept: ONCOLOGY | Facility: HOSPITAL | Age: 75
Setting detail: INFUSION SERIES
Discharge: HOME | End: 2020-02-24

## 2020-01-01 VITALS
HEART RATE: 108 BPM | TEMPERATURE: 98.5 F | RESPIRATION RATE: 20 BRPM | BODY MASS INDEX: 19.84 KG/M2 | SYSTOLIC BLOOD PRESSURE: 105 MMHG | WEIGHT: 126.4 LBS | HEIGHT: 67 IN | DIASTOLIC BLOOD PRESSURE: 69 MMHG

## 2020-01-01 VITALS
SYSTOLIC BLOOD PRESSURE: 132 MMHG | BODY MASS INDEX: 21.06 KG/M2 | WEIGHT: 134.2 LBS | HEART RATE: 77 BPM | RESPIRATION RATE: 20 BRPM | HEIGHT: 67 IN | TEMPERATURE: 98.4 F | DIASTOLIC BLOOD PRESSURE: 54 MMHG

## 2020-01-01 VITALS
HEIGHT: 63 IN | RESPIRATION RATE: 20 BRPM | TEMPERATURE: 90.1 F | BODY MASS INDEX: 23.04 KG/M2 | DIASTOLIC BLOOD PRESSURE: 79 MMHG | WEIGHT: 130 LBS | HEART RATE: 67 BPM | SYSTOLIC BLOOD PRESSURE: 105 MMHG | OXYGEN SATURATION: 92 %

## 2020-01-01 VITALS
HEIGHT: 67 IN | SYSTOLIC BLOOD PRESSURE: 135 MMHG | HEART RATE: 72 BPM | WEIGHT: 128 LBS | BODY MASS INDEX: 20.09 KG/M2 | DIASTOLIC BLOOD PRESSURE: 69 MMHG | RESPIRATION RATE: 18 BRPM | TEMPERATURE: 97.7 F

## 2020-01-01 VITALS
BODY MASS INDEX: 23.03 KG/M2 | OXYGEN SATURATION: 97 % | TEMPERATURE: 96.9 F | WEIGHT: 129.98 LBS | SYSTOLIC BLOOD PRESSURE: 77 MMHG | HEART RATE: 126 BPM | HEIGHT: 63 IN | DIASTOLIC BLOOD PRESSURE: 59 MMHG | RESPIRATION RATE: 18 BRPM

## 2020-01-01 VITALS
TEMPERATURE: 97.9 F | HEART RATE: 75 BPM | RESPIRATION RATE: 18 BRPM | HEIGHT: 67 IN | DIASTOLIC BLOOD PRESSURE: 75 MMHG | SYSTOLIC BLOOD PRESSURE: 150 MMHG | WEIGHT: 133 LBS | BODY MASS INDEX: 20.88 KG/M2

## 2020-01-01 VITALS
DIASTOLIC BLOOD PRESSURE: 67 MMHG | HEIGHT: 67 IN | WEIGHT: 122.6 LBS | HEART RATE: 77 BPM | BODY MASS INDEX: 19.24 KG/M2 | SYSTOLIC BLOOD PRESSURE: 108 MMHG | RESPIRATION RATE: 24 BRPM

## 2020-01-01 DIAGNOSIS — D64.81 ANEMIA DUE TO ANTINEOPLASTIC CHEMOTHERAPY: Primary | ICD-10-CM

## 2020-01-01 DIAGNOSIS — R19.7 DIARRHEA, UNSPECIFIED TYPE: ICD-10-CM

## 2020-01-01 DIAGNOSIS — Z79.4: Primary | ICD-10-CM

## 2020-01-01 DIAGNOSIS — C64.2 RENAL CELL CARCINOMA OF LEFT KIDNEY (HCC): Primary | ICD-10-CM

## 2020-01-01 DIAGNOSIS — I95.9 HYPOTENSION, UNSPECIFIED HYPOTENSION TYPE: ICD-10-CM

## 2020-01-01 DIAGNOSIS — C64.2 RENAL CELL CARCINOMA OF LEFT KIDNEY (HCC): ICD-10-CM

## 2020-01-01 DIAGNOSIS — E11.649 HYPOGLYCEMIC EPISODE IN PATIENT WITH DIABETES MELLITUS (HCC): Primary | ICD-10-CM

## 2020-01-01 DIAGNOSIS — C64.9 RENAL CELL CARCINOMA, UNSPECIFIED LATERALITY (HCC): Primary | ICD-10-CM

## 2020-01-01 DIAGNOSIS — C64.9 RENAL CELL CARCINOMA, UNSPECIFIED LATERALITY (HCC): ICD-10-CM

## 2020-01-01 DIAGNOSIS — E83.42 HYPOMAGNESEMIA: Primary | ICD-10-CM

## 2020-01-01 DIAGNOSIS — E83.42 HYPOMAGNESEMIA: ICD-10-CM

## 2020-01-01 DIAGNOSIS — T45.1X5A ANEMIA DUE TO ANTINEOPLASTIC CHEMOTHERAPY: Primary | ICD-10-CM

## 2020-01-01 DIAGNOSIS — Z95.0 PRESENCE OF CARDIAC PACEMAKER: ICD-10-CM

## 2020-01-01 DIAGNOSIS — C79.51 METASTATIC CANCER TO BONE (HCC): ICD-10-CM

## 2020-01-01 DIAGNOSIS — E44.0 MODERATE PROTEIN-CALORIE MALNUTRITION (HCC): ICD-10-CM

## 2020-01-01 DIAGNOSIS — E13.641: Primary | ICD-10-CM

## 2020-01-01 DIAGNOSIS — E87.6 HYPOKALEMIA: ICD-10-CM

## 2020-01-01 DIAGNOSIS — E86.0 DEHYDRATION: ICD-10-CM

## 2020-01-01 DIAGNOSIS — C41.9 METASTATIC BONE CANCER: ICD-10-CM

## 2020-01-01 DIAGNOSIS — I49.5 SICK SINUS SYNDROME (HCC): Primary | ICD-10-CM

## 2020-01-01 DIAGNOSIS — D68.9 COAGULOPATHY (HCC): ICD-10-CM

## 2020-01-01 DIAGNOSIS — E87.6 HYPOKALEMIA: Primary | ICD-10-CM

## 2020-01-01 LAB
ALBUMIN SERPL-MCNC: 1.8 G/DL (ref 3.5–5.2)
ALBUMIN SERPL-MCNC: 2 G/DL (ref 3.5–5.2)
ALBUMIN SERPL-MCNC: 2.5 G/DL (ref 3.5–5.2)
ALBUMIN SERPL-MCNC: 2.7 G/DL (ref 3.5–5.2)
ALBUMIN SERPL-MCNC: 3.1 G/DL (ref 3.5–5.2)
ALBUMIN/GLOB SERPL: 0.9 G/DL
ALBUMIN/GLOB SERPL: 1 G/DL
ALBUMIN/GLOB SERPL: 1.5 G/DL
ALBUMIN/GLOB SERPL: 1.6 G/DL
ALBUMIN/GLOB SERPL: 1.7 G/DL
ALP SERPL-CCNC: 48 U/L (ref 39–117)
ALP SERPL-CCNC: 64 U/L (ref 39–117)
ALP SERPL-CCNC: 80 U/L (ref 39–117)
ALP SERPL-CCNC: 84 U/L (ref 39–117)
ALP SERPL-CCNC: 85 U/L (ref 39–117)
ALT SERPL W P-5'-P-CCNC: 27 U/L (ref 1–33)
ALT SERPL W P-5'-P-CCNC: 35 U/L (ref 1–33)
ALT SERPL W P-5'-P-CCNC: 50 U/L (ref 1–33)
ALT SERPL W P-5'-P-CCNC: 85 U/L (ref 1–33)
ALT SERPL W P-5'-P-CCNC: 94 U/L (ref 1–33)
ANION GAP SERPL CALCULATED.3IONS-SCNC: 10 MMOL/L (ref 5–15)
ANION GAP SERPL CALCULATED.3IONS-SCNC: 12 MMOL/L (ref 5–15)
ANION GAP SERPL CALCULATED.3IONS-SCNC: 12 MMOL/L (ref 5–15)
ANION GAP SERPL CALCULATED.3IONS-SCNC: 18 MMOL/L (ref 5–15)
ANION GAP SERPL CALCULATED.3IONS-SCNC: 7 MMOL/L (ref 5–15)
ANION GAP SERPL CALCULATED.3IONS-SCNC: 7 MMOL/L (ref 5–15)
APTT PPP: 44.7 SECONDS (ref 24–31)
APTT PPP: 69.1 SECONDS (ref 24–31)
AST SERPL-CCNC: 109 U/L (ref 1–32)
AST SERPL-CCNC: 34 U/L (ref 1–32)
AST SERPL-CCNC: 45 U/L (ref 1–32)
AST SERPL-CCNC: 62 U/L (ref 1–32)
AST SERPL-CCNC: 93 U/L (ref 1–32)
BACTERIA UR QL AUTO: ABNORMAL /HPF
BASOPHILS # BLD AUTO: 0.01 10*3/MM3 (ref 0–0.2)
BASOPHILS # BLD AUTO: 0.01 10*3/MM3 (ref 0–0.2)
BASOPHILS # BLD AUTO: 0.02 10*3/MM3 (ref 0–0.2)
BASOPHILS # BLD AUTO: 0.02 10*3/MM3 (ref 0–0.2)
BASOPHILS # BLD AUTO: 0.04 10*3/MM3 (ref 0–0.2)
BASOPHILS NFR BLD AUTO: 0.2 % (ref 0–1.5)
BASOPHILS NFR BLD AUTO: 0.3 % (ref 0–1.5)
BASOPHILS NFR BLD AUTO: 0.4 % (ref 0–1.5)
BASOPHILS NFR BLD AUTO: 0.4 % (ref 0–1.5)
BASOPHILS NFR BLD AUTO: 0.5 % (ref 0–1.5)
BILIRUB SERPL-MCNC: 0.4 MG/DL (ref 0.2–1.2)
BILIRUB SERPL-MCNC: 0.4 MG/DL (ref 0.2–1.2)
BILIRUB SERPL-MCNC: 0.5 MG/DL (ref 0.2–1.2)
BILIRUB UR QL STRIP: ABNORMAL
BILIRUB UR QL STRIP: NEGATIVE
BUN BLD-MCNC: 11 MG/DL (ref 8–23)
BUN BLD-MCNC: 18 MG/DL (ref 8–23)
BUN BLD-MCNC: 27 MG/DL (ref 8–23)
BUN BLD-MCNC: 28 MG/DL (ref 8–23)
BUN BLD-MCNC: 54 MG/DL (ref 8–23)
BUN BLD-MCNC: 54 MG/DL (ref 8–23)
BUN/CREAT SERPL: 16.7 (ref 7–25)
BUN/CREAT SERPL: 19.6 (ref 7–25)
BUN/CREAT SERPL: 21.1 (ref 7–25)
BUN/CREAT SERPL: 21.4 (ref 7–25)
BUN/CREAT SERPL: 22.9 (ref 7–25)
BUN/CREAT SERPL: 23.5 (ref 7–25)
BURR CELLS BLD QL SMEAR: ABNORMAL
CALCIUM SPEC-SCNC: 7.6 MG/DL (ref 8.6–10.5)
CALCIUM SPEC-SCNC: 7.7 MG/DL (ref 8.6–10.5)
CALCIUM SPEC-SCNC: 8 MG/DL (ref 8.6–10.5)
CALCIUM SPEC-SCNC: 8.1 MG/DL (ref 8.6–10.5)
CALCIUM SPEC-SCNC: 8.2 MG/DL (ref 8.6–10.5)
CALCIUM SPEC-SCNC: 8.3 MG/DL (ref 8.6–10.5)
CHLORIDE SERPL-SCNC: 109 MMOL/L (ref 98–107)
CHLORIDE SERPL-SCNC: 110 MMOL/L (ref 98–107)
CHLORIDE SERPL-SCNC: 111 MMOL/L (ref 98–107)
CHLORIDE SERPL-SCNC: 114 MMOL/L (ref 98–107)
CLARITY UR: ABNORMAL
CLARITY UR: ABNORMAL
CO2 SERPL-SCNC: 12 MMOL/L (ref 22–29)
CO2 SERPL-SCNC: 19 MMOL/L (ref 22–29)
CO2 SERPL-SCNC: 20 MMOL/L (ref 22–29)
CO2 SERPL-SCNC: 21 MMOL/L (ref 22–29)
CO2 SERPL-SCNC: 22 MMOL/L (ref 22–29)
CO2 SERPL-SCNC: 23 MMOL/L (ref 22–29)
COLOR UR: ABNORMAL
COLOR UR: ABNORMAL
CORTIS SERPL-MCNC: 189.6 MCG/DL
CREAT BLD-MCNC: 0.66 MG/DL (ref 0.57–1)
CREAT BLD-MCNC: 0.84 MG/DL (ref 0.57–1)
CREAT BLD-MCNC: 1.18 MG/DL (ref 0.57–1)
CREAT BLD-MCNC: 1.33 MG/DL (ref 0.57–1)
CREAT BLD-MCNC: 2.3 MG/DL (ref 0.57–1)
CREAT BLD-MCNC: 2.76 MG/DL (ref 0.57–1)
CRP SERPL-MCNC: 30.5 MG/DL (ref 0–0.5)
DEPRECATED RDW RBC AUTO: 46.6 FL (ref 37–54)
DEPRECATED RDW RBC AUTO: 46.7 FL (ref 37–54)
DEPRECATED RDW RBC AUTO: 48 FL (ref 37–54)
DEPRECATED RDW RBC AUTO: 50.3 FL (ref 37–54)
DEPRECATED RDW RBC AUTO: 51.9 FL (ref 37–54)
DEPRECATED RDW RBC AUTO: 52.1 FL (ref 37–54)
DEPRECATED RDW RBC AUTO: 52.9 FL (ref 37–54)
EOSINOPHIL # BLD AUTO: 0.01 10*3/MM3 (ref 0–0.4)
EOSINOPHIL # BLD AUTO: 0.04 10*3/MM3 (ref 0–0.4)
EOSINOPHIL # BLD AUTO: 0.06 10*3/MM3 (ref 0–0.4)
EOSINOPHIL # BLD AUTO: 0.08 10*3/MM3 (ref 0–0.4)
EOSINOPHIL # BLD AUTO: 0.15 10*3/MM3 (ref 0–0.4)
EOSINOPHIL NFR BLD AUTO: 0.3 % (ref 0.3–6.2)
EOSINOPHIL NFR BLD AUTO: 0.5 % (ref 0.3–6.2)
EOSINOPHIL NFR BLD AUTO: 1.4 % (ref 0.3–6.2)
EOSINOPHIL NFR BLD AUTO: 1.7 % (ref 0.3–6.2)
EOSINOPHIL NFR BLD AUTO: 2.9 % (ref 0.3–6.2)
ERYTHROCYTE [DISTWIDTH] IN BLOOD BY AUTOMATED COUNT: 13.7 % (ref 12.3–15.4)
ERYTHROCYTE [DISTWIDTH] IN BLOOD BY AUTOMATED COUNT: 14 % (ref 12.3–15.4)
ERYTHROCYTE [DISTWIDTH] IN BLOOD BY AUTOMATED COUNT: 14.6 % (ref 12.3–15.4)
ERYTHROCYTE [DISTWIDTH] IN BLOOD BY AUTOMATED COUNT: 14.8 % (ref 12.3–15.4)
ERYTHROCYTE [DISTWIDTH] IN BLOOD BY AUTOMATED COUNT: 15.3 % (ref 12.3–15.4)
ERYTHROCYTE [DISTWIDTH] IN BLOOD BY AUTOMATED COUNT: 16 % (ref 12.3–15.4)
ERYTHROCYTE [DISTWIDTH] IN BLOOD BY AUTOMATED COUNT: 16 % (ref 12.3–15.4)
GFR SERPL CREATININE-BSD FRML MDRD: 17 ML/MIN/1.73
GFR SERPL CREATININE-BSD FRML MDRD: 21 ML/MIN/1.73
GFR SERPL CREATININE-BSD FRML MDRD: 39 ML/MIN/1.73
GFR SERPL CREATININE-BSD FRML MDRD: 45 ML/MIN/1.73
GFR SERPL CREATININE-BSD FRML MDRD: 66 ML/MIN/1.73
GFR SERPL CREATININE-BSD FRML MDRD: 88 ML/MIN/1.73
GIANT PLATELETS: ABNORMAL
GLOBULIN UR ELPH-MCNC: 1.5 GM/DL
GLOBULIN UR ELPH-MCNC: 1.8 GM/DL
GLOBULIN UR ELPH-MCNC: 1.9 GM/DL
GLOBULIN UR ELPH-MCNC: 2 GM/DL
GLOBULIN UR ELPH-MCNC: 2.1 GM/DL
GLUCOSE BLD-MCNC: 117 MG/DL (ref 65–99)
GLUCOSE BLD-MCNC: 135 MG/DL (ref 65–99)
GLUCOSE BLD-MCNC: 21 MG/DL (ref 65–99)
GLUCOSE BLD-MCNC: 39 MG/DL (ref 65–99)
GLUCOSE BLD-MCNC: 83 MG/DL (ref 65–99)
GLUCOSE BLD-MCNC: 84 MG/DL (ref 65–99)
GLUCOSE BLDC GLUCOMTR-MCNC: 123 MG/DL (ref 70–105)
GLUCOSE BLDC GLUCOMTR-MCNC: 164 MG/DL (ref 70–105)
GLUCOSE BLDC GLUCOMTR-MCNC: 183 MG/DL (ref 70–105)
GLUCOSE BLDC GLUCOMTR-MCNC: 75 MG/DL (ref 70–105)
GLUCOSE BLDC GLUCOMTR-MCNC: <20 MG/DL (ref 70–105)
GLUCOSE UR STRIP-MCNC: NEGATIVE MG/DL
GLUCOSE UR STRIP-MCNC: NEGATIVE MG/DL
HCT VFR BLD AUTO: 34.6 % (ref 34–46.6)
HCT VFR BLD AUTO: 35.1 % (ref 34–46.6)
HCT VFR BLD AUTO: 36.7 % (ref 34–46.6)
HCT VFR BLD AUTO: 39 % (ref 34–46.6)
HCT VFR BLD AUTO: 41.8 % (ref 34–46.6)
HCT VFR BLD AUTO: 42.2 % (ref 34–46.6)
HCT VFR BLD AUTO: 45 % (ref 34–46.6)
HGB BLD-MCNC: 11.4 G/DL (ref 12–15.9)
HGB BLD-MCNC: 11.6 G/DL (ref 12–15.9)
HGB BLD-MCNC: 12.2 G/DL (ref 12–15.9)
HGB BLD-MCNC: 13.4 G/DL (ref 12–15.9)
HGB BLD-MCNC: 13.9 G/DL (ref 12–15.9)
HGB BLD-MCNC: 14.8 G/DL (ref 12–15.9)
HGB BLD-MCNC: 14.9 G/DL (ref 12–15.9)
HGB UR QL STRIP.AUTO: NEGATIVE
HGB UR QL STRIP.AUTO: NEGATIVE
HYALINE CASTS UR QL AUTO: ABNORMAL /LPF
INR PPP: 3.11 (ref 2–3)
INR PPP: 3.15 (ref 0.9–1.1)
KETONES UR QL STRIP: ABNORMAL
KETONES UR QL STRIP: NEGATIVE
LEUKOCYTE ESTERASE UR QL STRIP.AUTO: ABNORMAL
LEUKOCYTE ESTERASE UR QL STRIP.AUTO: ABNORMAL
LYMPHOCYTES # BLD AUTO: 0.41 10*3/MM3 (ref 0.7–3.1)
LYMPHOCYTES # BLD AUTO: 0.76 10*3/MM3 (ref 0.7–3.1)
LYMPHOCYTES # BLD AUTO: 0.77 10*3/MM3 (ref 0.7–3.1)
LYMPHOCYTES # BLD AUTO: 0.78 10*3/MM3 (ref 0.7–3.1)
LYMPHOCYTES # BLD AUTO: 0.89 10*3/MM3 (ref 0.7–3.1)
LYMPHOCYTES # BLD MANUAL: 0.23 10*3/MM3 (ref 0.7–3.1)
LYMPHOCYTES # BLD MANUAL: 0.68 10*3/MM3 (ref 0.7–3.1)
LYMPHOCYTES NFR BLD AUTO: 10.7 % (ref 19.6–45.3)
LYMPHOCYTES NFR BLD AUTO: 15.1 % (ref 19.6–45.3)
LYMPHOCYTES NFR BLD AUTO: 17 % (ref 19.6–45.3)
LYMPHOCYTES NFR BLD AUTO: 20.7 % (ref 19.6–45.3)
LYMPHOCYTES NFR BLD AUTO: 9.6 % (ref 19.6–45.3)
LYMPHOCYTES NFR BLD MANUAL: 10 % (ref 19.6–45.3)
LYMPHOCYTES NFR BLD MANUAL: 10 % (ref 19.6–45.3)
LYMPHOCYTES NFR BLD MANUAL: 2 % (ref 5–12)
LYMPHOCYTES NFR BLD MANUAL: 8 % (ref 5–12)
MAGNESIUM SERPL-MCNC: 1.5 MG/DL (ref 1.6–2.4)
MAGNESIUM SERPL-MCNC: 1.5 MG/DL (ref 1.6–2.4)
MAGNESIUM SERPL-MCNC: 1.8 MG/DL (ref 1.6–2.4)
MAGNESIUM SERPL-MCNC: 1.8 MG/DL (ref 1.6–2.4)
MCH RBC QN AUTO: 31 PG (ref 26.6–33)
MCH RBC QN AUTO: 31.2 PG (ref 26.6–33)
MCH RBC QN AUTO: 31.5 PG (ref 26.6–33)
MCH RBC QN AUTO: 31.6 PG (ref 26.6–33)
MCH RBC QN AUTO: 31.6 PG (ref 26.6–33)
MCH RBC QN AUTO: 32 PG (ref 26.6–33)
MCH RBC QN AUTO: 32.5 PG (ref 26.6–33)
MCHC RBC AUTO-ENTMCNC: 32.5 G/DL (ref 31.5–35.7)
MCHC RBC AUTO-ENTMCNC: 33 G/DL (ref 31.5–35.7)
MCHC RBC AUTO-ENTMCNC: 33.2 G/DL (ref 31.5–35.7)
MCHC RBC AUTO-ENTMCNC: 33.3 G/DL (ref 31.5–35.7)
MCHC RBC AUTO-ENTMCNC: 33.5 G/DL (ref 31.5–35.7)
MCHC RBC AUTO-ENTMCNC: 34.4 G/DL (ref 31.5–35.7)
MCHC RBC AUTO-ENTMCNC: 35.2 G/DL (ref 31.5–35.7)
MCV RBC AUTO: 91.8 FL (ref 79–97)
MCV RBC AUTO: 92.3 FL (ref 79–97)
MCV RBC AUTO: 93.8 FL (ref 79–97)
MCV RBC AUTO: 95 FL (ref 79–97)
MCV RBC AUTO: 95.1 FL (ref 79–97)
MCV RBC AUTO: 95.6 FL (ref 79–97)
MCV RBC AUTO: 96.2 FL (ref 79–97)
METAMYELOCYTES NFR BLD MANUAL: 4 % (ref 0–0)
METAMYELOCYTES NFR BLD MANUAL: 4 % (ref 0–0)
MONOCYTES # BLD AUTO: 0.05 10*3/MM3 (ref 0.1–0.9)
MONOCYTES # BLD AUTO: 0.34 10*3/MM3 (ref 0.1–0.9)
MONOCYTES # BLD AUTO: 0.54 10*3/MM3 (ref 0.1–0.9)
MONOCYTES # BLD AUTO: 0.61 10*3/MM3 (ref 0.1–0.9)
MONOCYTES # BLD AUTO: 0.69 10*3/MM3 (ref 0.1–0.9)
MONOCYTES # BLD AUTO: 0.74 10*3/MM3 (ref 0.1–0.9)
MONOCYTES # BLD AUTO: 1.36 10*3/MM3 (ref 0.1–0.9)
MONOCYTES NFR BLD AUTO: 13.5 % (ref 5–12)
MONOCYTES NFR BLD AUTO: 16.1 % (ref 5–12)
MONOCYTES NFR BLD AUTO: 16.3 % (ref 5–12)
MONOCYTES NFR BLD AUTO: 16.6 % (ref 5–12)
MONOCYTES NFR BLD AUTO: 7.9 % (ref 5–12)
MYELOCYTES NFR BLD MANUAL: 1 % (ref 0–0)
NEUTROPHILS # BLD AUTO: 1.93 10*3/MM3 (ref 1.7–7)
NEUTROPHILS # BLD AUTO: 2.29 10*3/MM3 (ref 1.7–7)
NEUTROPHILS # BLD AUTO: 2.97 10*3/MM3 (ref 1.7–7)
NEUTROPHILS # BLD AUTO: 3.46 10*3/MM3 (ref 1.7–7)
NEUTROPHILS # BLD AUTO: 3.47 10*3/MM3 (ref 1.7–7)
NEUTROPHILS # BLD AUTO: 5.24 10*3/MM3 (ref 1.7–7)
NEUTROPHILS # BLD AUTO: 6 10*3/MM3 (ref 1.7–7)
NEUTROPHILS NFR BLD AUTO: 62.1 % (ref 42.7–76)
NEUTROPHILS NFR BLD AUTO: 64.8 % (ref 42.7–76)
NEUTROPHILS NFR BLD AUTO: 68.1 % (ref 42.7–76)
NEUTROPHILS NFR BLD AUTO: 72 % (ref 42.7–76)
NEUTROPHILS NFR BLD AUTO: 80.9 % (ref 42.7–76)
NEUTROPHILS NFR BLD MANUAL: 20 % (ref 42.7–76)
NEUTROPHILS NFR BLD MANUAL: 52 % (ref 42.7–76)
NEUTS BAND NFR BLD MANUAL: 32 % (ref 0–5)
NEUTS BAND NFR BLD MANUAL: 57 % (ref 0–5)
NEUTS VAC BLD QL SMEAR: ABNORMAL
NEUTS VAC BLD QL SMEAR: ABNORMAL
NITRITE UR QL STRIP: NEGATIVE
NITRITE UR QL STRIP: NEGATIVE
NRBC SPEC MANUAL: 1 /100 WBC (ref 0–0.2)
NT-PROBNP SERPL-MCNC: ABNORMAL PG/ML (ref 5–900)
PH UR STRIP.AUTO: <=5 [PH] (ref 5–8)
PH UR STRIP.AUTO: <=5 [PH] (ref 5–8)
PHOSPHATE SERPL-MCNC: 5 MG/DL (ref 2.5–4.5)
PLAT MORPH BLD: NORMAL
PLATELET # BLD AUTO: 191 10*3/MM3 (ref 140–450)
PLATELET # BLD AUTO: 205 10*3/MM3 (ref 140–450)
PLATELET # BLD AUTO: 212 10*3/MM3 (ref 140–450)
PLATELET # BLD AUTO: 240 10*3/MM3 (ref 140–450)
PLATELET # BLD AUTO: 250 10*3/MM3 (ref 140–450)
PLATELET # BLD AUTO: 275 10*3/MM3 (ref 140–450)
PLATELET # BLD AUTO: 407 10*3/MM3 (ref 140–450)
PMV BLD AUTO: 7.9 FL (ref 6–12)
PMV BLD AUTO: 8.3 FL (ref 6–12)
PMV BLD AUTO: 8.4 FL (ref 6–12)
PMV BLD AUTO: 9.1 FL (ref 6–12)
PMV BLD AUTO: 9.1 FL (ref 6–12)
PMV BLD AUTO: 9.5 FL (ref 6–12)
PMV BLD AUTO: 9.5 FL (ref 6–12)
POTASSIUM BLD-SCNC: 3.2 MMOL/L (ref 3.5–5.2)
POTASSIUM BLD-SCNC: 3.4 MMOL/L (ref 3.5–5.2)
POTASSIUM BLD-SCNC: 4.4 MMOL/L (ref 3.5–5.2)
POTASSIUM BLD-SCNC: 4.5 MMOL/L (ref 3.5–5.2)
POTASSIUM BLD-SCNC: 4.8 MMOL/L (ref 3.5–5.2)
POTASSIUM BLD-SCNC: 5 MMOL/L (ref 3.5–5.2)
PROCALCITONIN SERPL-MCNC: 24.63 NG/ML (ref 0.1–0.25)
PROT SERPL-MCNC: 3.9 G/DL (ref 6–8.5)
PROT SERPL-MCNC: 4 G/DL (ref 6–8.5)
PROT SERPL-MCNC: 4 G/DL (ref 6–8.5)
PROT SERPL-MCNC: 4.5 G/DL (ref 6–8.5)
PROT SERPL-MCNC: 5 G/DL (ref 6–8.5)
PROT UR QL STRIP: ABNORMAL
PROT UR QL STRIP: NEGATIVE
PROTHROMBIN TIME: 29.2 SECONDS (ref 19.4–28.5)
PROTHROMBIN TIME: 29.6 SECONDS (ref 9.6–11.7)
RBC # BLD AUTO: 3.62 10*6/MM3 (ref 3.77–5.28)
RBC # BLD AUTO: 3.65 10*6/MM3 (ref 3.77–5.28)
RBC # BLD AUTO: 3.86 10*6/MM3 (ref 3.77–5.28)
RBC # BLD AUTO: 4.25 10*6/MM3 (ref 3.77–5.28)
RBC # BLD AUTO: 4.4 10*6/MM3 (ref 3.77–5.28)
RBC # BLD AUTO: 4.57 10*6/MM3 (ref 3.77–5.28)
RBC # BLD AUTO: 4.8 10*6/MM3 (ref 3.77–5.28)
RBC # UR: ABNORMAL /HPF
RBC MORPH BLD: NORMAL
REF LAB TEST METHOD: ABNORMAL
SCAN SLIDE: NORMAL
SCAN SLIDE: NORMAL
SMUDGE CELLS BLD QL SMEAR: ABNORMAL
SODIUM BLD-SCNC: 138 MMOL/L (ref 136–145)
SODIUM BLD-SCNC: 140 MMOL/L (ref 136–145)
SODIUM BLD-SCNC: 140 MMOL/L (ref 136–145)
SODIUM BLD-SCNC: 142 MMOL/L (ref 136–145)
SODIUM BLD-SCNC: 142 MMOL/L (ref 136–145)
SODIUM BLD-SCNC: 145 MMOL/L (ref 136–145)
SP GR UR STRIP: 1.02 (ref 1–1.03)
SP GR UR STRIP: 1.02 (ref 1–1.03)
SQUAMOUS #/AREA URNS HPF: ABNORMAL /HPF
T4 FREE SERPL-MCNC: 1.27 NG/DL (ref 0.93–1.7)
TOXIC GRANULATION: ABNORMAL
TROPONIN T SERPL-MCNC: 0.03 NG/ML (ref 0–0.03)
TSH SERPL DL<=0.05 MIU/L-ACNC: 3.19 UIU/ML (ref 0.27–4.2)
UROBILINOGEN UR QL STRIP: ABNORMAL
UROBILINOGEN UR QL STRIP: ABNORMAL
WBC NRBC COR # BLD: 2.3 10*3/MM3 (ref 3.4–10.8)
WBC NRBC COR # BLD: 3.68 10*3/MM3 (ref 3.4–10.8)
WBC NRBC COR # BLD: 4.28 10*3/MM3 (ref 3.4–10.8)
WBC NRBC COR # BLD: 4.59 10*3/MM3 (ref 3.4–10.8)
WBC NRBC COR # BLD: 5.1 10*3/MM3 (ref 3.4–10.8)
WBC NRBC COR # BLD: 6.8 10*3/MM3 (ref 3.4–10.8)
WBC NRBC COR # BLD: 8.33 10*3/MM3 (ref 3.4–10.8)
WBC UR QL AUTO: ABNORMAL /HPF
WHOLE BLOOD HOLD SPECIMEN: NORMAL

## 2020-01-01 PROCEDURE — 99215 OFFICE O/P EST HI 40 MIN: CPT | Performed by: INTERNAL MEDICINE

## 2020-01-01 PROCEDURE — 84443 ASSAY THYROID STIM HORMONE: CPT | Performed by: NURSE PRACTITIONER

## 2020-01-01 PROCEDURE — P9612 CATHETERIZE FOR URINE SPEC: HCPCS

## 2020-01-01 PROCEDURE — 99284 EMERGENCY DEPT VISIT MOD MDM: CPT

## 2020-01-01 PROCEDURE — 85025 COMPLETE CBC W/AUTO DIFF WBC: CPT

## 2020-01-01 PROCEDURE — 83735 ASSAY OF MAGNESIUM: CPT | Performed by: NURSE PRACTITIONER

## 2020-01-01 PROCEDURE — 85025 COMPLETE CBC W/AUTO DIFF WBC: CPT | Performed by: NURSE PRACTITIONER

## 2020-01-01 PROCEDURE — 81001 URINALYSIS AUTO W/SCOPE: CPT | Performed by: EMERGENCY MEDICINE

## 2020-01-01 PROCEDURE — 80053 COMPREHEN METABOLIC PANEL: CPT | Performed by: NURSE PRACTITIONER

## 2020-01-01 PROCEDURE — 84484 ASSAY OF TROPONIN QUANT: CPT | Performed by: EMERGENCY MEDICINE

## 2020-01-01 PROCEDURE — 93294 REM INTERROG EVL PM/LDLS PM: CPT | Performed by: NURSE PRACTITIONER

## 2020-01-01 PROCEDURE — 85025 COMPLETE CBC W/AUTO DIFF WBC: CPT | Performed by: EMERGENCY MEDICINE

## 2020-01-01 PROCEDURE — 85025 COMPLETE CBC W/AUTO DIFF WBC: CPT | Performed by: INTERNAL MEDICINE

## 2020-01-01 PROCEDURE — 25010000003 HEPARIN LOCK FLUCH PER 10 UNITS: Performed by: INTERNAL MEDICINE

## 2020-01-01 PROCEDURE — 85610 PROTHROMBIN TIME: CPT | Performed by: EMERGENCY MEDICINE

## 2020-01-01 PROCEDURE — 93296 REM INTERROG EVL PM/IDS: CPT | Performed by: NURSE PRACTITIONER

## 2020-01-01 PROCEDURE — 82962 GLUCOSE BLOOD TEST: CPT

## 2020-01-01 PROCEDURE — 25010000002 NIVOLUMAB 240 MG/24ML SOLUTION 24 ML VIAL: Performed by: NURSE PRACTITIONER

## 2020-01-01 PROCEDURE — 85007 BL SMEAR W/DIFF WBC COUNT: CPT | Performed by: EMERGENCY MEDICINE

## 2020-01-01 PROCEDURE — 81003 URINALYSIS AUTO W/O SCOPE: CPT | Performed by: INTERNAL MEDICINE

## 2020-01-01 PROCEDURE — 93005 ELECTROCARDIOGRAM TRACING: CPT | Performed by: EMERGENCY MEDICINE

## 2020-01-01 PROCEDURE — 80048 BASIC METABOLIC PNL TOTAL CA: CPT | Performed by: INTERNAL MEDICINE

## 2020-01-01 PROCEDURE — 0 IOPAMIDOL PER 1 ML: Performed by: NURSE PRACTITIONER

## 2020-01-01 PROCEDURE — 86140 C-REACTIVE PROTEIN: CPT | Performed by: NURSE PRACTITIONER

## 2020-01-01 PROCEDURE — G0463 HOSPITAL OUTPT CLINIC VISIT: HCPCS

## 2020-01-01 PROCEDURE — 25010000002 BEVACIZUMAB-AWWB 400 MG/16ML SOLUTION 16 ML VIAL: Performed by: INTERNAL MEDICINE

## 2020-01-01 PROCEDURE — 94640 AIRWAY INHALATION TREATMENT: CPT

## 2020-01-01 PROCEDURE — 83880 ASSAY OF NATRIURETIC PEPTIDE: CPT | Performed by: EMERGENCY MEDICINE

## 2020-01-01 PROCEDURE — 74177 CT ABD & PELVIS W/CONTRAST: CPT

## 2020-01-01 PROCEDURE — 36591 DRAW BLOOD OFF VENOUS DEVICE: CPT

## 2020-01-01 PROCEDURE — 84100 ASSAY OF PHOSPHORUS: CPT | Performed by: NURSE PRACTITIONER

## 2020-01-01 PROCEDURE — 82533 TOTAL CORTISOL: CPT | Performed by: NURSE PRACTITIONER

## 2020-01-01 PROCEDURE — 84439 ASSAY OF FREE THYROXINE: CPT | Performed by: NURSE PRACTITIONER

## 2020-01-01 PROCEDURE — 36415 COLL VENOUS BLD VENIPUNCTURE: CPT | Performed by: INTERNAL MEDICINE

## 2020-01-01 PROCEDURE — 96415 CHEMO IV INFUSION ADDL HR: CPT

## 2020-01-01 PROCEDURE — 80053 COMPREHEN METABOLIC PANEL: CPT | Performed by: EMERGENCY MEDICINE

## 2020-01-01 PROCEDURE — 96413 CHEMO IV INFUSION 1 HR: CPT

## 2020-01-01 PROCEDURE — 25010000002 CEFTAZIDIME PER 500 MG: Performed by: NURSE PRACTITIONER

## 2020-01-01 PROCEDURE — 96374 THER/PROPH/DIAG INJ IV PUSH: CPT

## 2020-01-01 PROCEDURE — 25010000003 HEPARIN LOCK FLUCH PER 10 UNITS: Performed by: EMERGENCY MEDICINE

## 2020-01-01 PROCEDURE — 96361 HYDRATE IV INFUSION ADD-ON: CPT

## 2020-01-01 PROCEDURE — 25010000002 BEVACIZUMAB-AWWB 100 MG/4ML SOLUTION 4 ML VIAL: Performed by: INTERNAL MEDICINE

## 2020-01-01 PROCEDURE — 85730 THROMBOPLASTIN TIME PARTIAL: CPT | Performed by: EMERGENCY MEDICINE

## 2020-01-01 PROCEDURE — 96376 TX/PRO/DX INJ SAME DRUG ADON: CPT

## 2020-01-01 PROCEDURE — 96413 CHEMO IV INFUSION 1 HR: CPT | Performed by: INTERNAL MEDICINE

## 2020-01-01 PROCEDURE — 71045 X-RAY EXAM CHEST 1 VIEW: CPT

## 2020-01-01 PROCEDURE — 99285 EMERGENCY DEPT VISIT HI MDM: CPT

## 2020-01-01 PROCEDURE — 36415 COLL VENOUS BLD VENIPUNCTURE: CPT

## 2020-01-01 PROCEDURE — 84145 PROCALCITONIN (PCT): CPT | Performed by: NURSE PRACTITIONER

## 2020-01-01 RX ORDER — HEPARIN SODIUM (PORCINE) LOCK FLUSH IV SOLN 100 UNIT/ML 100 UNIT/ML
500 SOLUTION INTRAVENOUS AS NEEDED
Status: CANCELLED | OUTPATIENT
Start: 2020-01-01

## 2020-01-01 RX ORDER — SODIUM CHLORIDE 0.9 % (FLUSH) 0.9 %
10 SYRINGE (ML) INJECTION AS NEEDED
Status: DISCONTINUED | OUTPATIENT
Start: 2020-01-01 | End: 2020-01-01 | Stop reason: HOSPADM

## 2020-01-01 RX ORDER — LIDOCAINE AND PRILOCAINE 25; 25 MG/G; MG/G
CREAM TOPICAL
Qty: 30 EACH | Refills: 3 | Status: SHIPPED | OUTPATIENT
Start: 2020-01-01

## 2020-01-01 RX ORDER — CALCIUM CARBONATE 200(500)MG
2 TABLET,CHEWABLE ORAL 3 TIMES DAILY
Qty: 180 TABLET | Refills: 2 | Status: SHIPPED | OUTPATIENT
Start: 2020-01-01

## 2020-01-01 RX ORDER — NOREPINEPHRINE BIT/0.9 % NACL 8 MG/250ML
.02-.3 INFUSION BOTTLE (ML) INTRAVENOUS
Status: DISCONTINUED | OUTPATIENT
Start: 2020-01-01 | End: 2020-01-01 | Stop reason: HOSPADM

## 2020-01-01 RX ORDER — SODIUM CHLORIDE 0.9 % (FLUSH) 0.9 %
10 SYRINGE (ML) INJECTION EVERY 12 HOURS SCHEDULED
Status: DISCONTINUED | OUTPATIENT
Start: 2020-01-01 | End: 2020-01-01 | Stop reason: HOSPADM

## 2020-01-01 RX ORDER — ONDANSETRON 4 MG/1
4 TABLET, FILM COATED ORAL EVERY 6 HOURS PRN
Status: DISCONTINUED | OUTPATIENT
Start: 2020-01-01 | End: 2020-01-01 | Stop reason: HOSPADM

## 2020-01-01 RX ORDER — HEPARIN SODIUM (PORCINE) LOCK FLUSH IV SOLN 100 UNIT/ML 100 UNIT/ML
500 SOLUTION INTRAVENOUS ONCE
Status: COMPLETED | OUTPATIENT
Start: 2020-01-01 | End: 2020-01-01

## 2020-01-01 RX ORDER — POTASSIUM CHLORIDE 750 MG/1
TABLET, EXTENDED RELEASE ORAL
Qty: 30 TABLET | Refills: 2 | Status: SHIPPED | OUTPATIENT
Start: 2020-01-01 | End: 2020-01-01

## 2020-01-01 RX ORDER — DIPHENOXYLATE HYDROCHLORIDE AND ATROPINE SULFATE 2.5; .025 MG/1; MG/1
TABLET ORAL
Qty: 30 TABLET | Refills: 0 | Status: SHIPPED | OUTPATIENT
Start: 2020-01-01 | End: 2020-01-01 | Stop reason: SDUPTHER

## 2020-01-01 RX ORDER — ACETAMINOPHEN 325 MG/1
650 TABLET ORAL EVERY 4 HOURS PRN
Status: DISCONTINUED | OUTPATIENT
Start: 2020-01-01 | End: 2020-01-01 | Stop reason: HOSPADM

## 2020-01-01 RX ORDER — IPRATROPIUM BROMIDE AND ALBUTEROL SULFATE 2.5; .5 MG/3ML; MG/3ML
3 SOLUTION RESPIRATORY (INHALATION)
Status: DISCONTINUED | OUTPATIENT
Start: 2020-01-01 | End: 2020-01-01 | Stop reason: HOSPADM

## 2020-01-01 RX ORDER — SODIUM CHLORIDE 9 MG/ML
250 INJECTION, SOLUTION INTRAVENOUS ONCE
Status: CANCELLED | OUTPATIENT
Start: 2020-01-01

## 2020-01-01 RX ORDER — ALBUTEROL SULFATE 2.5 MG/3ML
2.5 SOLUTION RESPIRATORY (INHALATION) ONCE
Status: COMPLETED | OUTPATIENT
Start: 2020-01-01 | End: 2020-01-01

## 2020-01-01 RX ORDER — NICOTINE POLACRILEX 4 MG
15 LOZENGE BUCCAL
Status: DISCONTINUED | OUTPATIENT
Start: 2020-01-01 | End: 2020-01-01 | Stop reason: HOSPADM

## 2020-01-01 RX ORDER — CEFTAZIDIME 1 G/1
1 INJECTION, POWDER, FOR SOLUTION INTRAMUSCULAR; INTRAVENOUS EVERY 24 HOURS
Status: DISCONTINUED | OUTPATIENT
Start: 2020-01-01 | End: 2020-01-01

## 2020-01-01 RX ORDER — BISACODYL 10 MG
10 SUPPOSITORY, RECTAL RECTAL DAILY PRN
Status: DISCONTINUED | OUTPATIENT
Start: 2020-01-01 | End: 2020-01-01 | Stop reason: HOSPADM

## 2020-01-01 RX ORDER — ANTACID TABLETS 500 MG/1
TABLET, CHEWABLE ORAL
Qty: 150 TABLET | Refills: 1 | Status: SHIPPED | OUTPATIENT
Start: 2020-01-01 | End: 2020-01-01

## 2020-01-01 RX ORDER — DEXTROSE MONOHYDRATE 25 G/50ML
INJECTION, SOLUTION INTRAVENOUS
Status: COMPLETED
Start: 2020-01-01 | End: 2020-01-01

## 2020-01-01 RX ORDER — PREDNISONE 10 MG/1
30 TABLET ORAL DAILY
Qty: 180 TABLET | Refills: 0 | Status: SHIPPED | OUTPATIENT
Start: 2020-01-01 | End: 2020-01-01

## 2020-01-01 RX ORDER — CALCIUM CARBONATE 200(500)MG
1 TABLET,CHEWABLE ORAL 3 TIMES DAILY
COMMUNITY

## 2020-01-01 RX ORDER — DIBASIC SODIUM PHOSPHATE, MONOBASIC POTASSIUM PHOSPHATE AND MONOBASIC SODIUM PHOSPHATE 852; 155; 130 MG/1; MG/1; MG/1
1 TABLET ORAL DAILY
COMMUNITY
Start: 2019-01-01

## 2020-01-01 RX ORDER — SODIUM CHLORIDE 0.9 % (FLUSH) 0.9 %
10 SYRINGE (ML) INJECTION AS NEEDED
Status: CANCELLED | OUTPATIENT
Start: 2020-01-01

## 2020-01-01 RX ORDER — DIPHENOXYLATE HYDROCHLORIDE AND ATROPINE SULFATE 2.5; .025 MG/1; MG/1
1 TABLET ORAL 4 TIMES DAILY PRN
Qty: 30 TABLET | Refills: 0 | Status: SHIPPED | OUTPATIENT
Start: 2020-01-01 | End: 2020-01-01

## 2020-01-01 RX ORDER — POTASSIUM CHLORIDE 750 MG/1
10 TABLET, FILM COATED, EXTENDED RELEASE ORAL DAILY
COMMUNITY

## 2020-01-01 RX ORDER — RIVAROXABAN 20 MG/1
TABLET, FILM COATED ORAL
Qty: 30 TABLET | Refills: 2 | Status: SHIPPED | OUTPATIENT
Start: 2020-01-01 | End: 2020-01-01 | Stop reason: SDUPTHER

## 2020-01-01 RX ORDER — SODIUM CHLORIDE 9 MG/ML
250 INJECTION, SOLUTION INTRAVENOUS ONCE
Status: DISCONTINUED | OUTPATIENT
Start: 2020-01-01 | End: 2020-01-01 | Stop reason: HOSPADM

## 2020-01-01 RX ORDER — ONDANSETRON 8 MG/1
8 TABLET, ORALLY DISINTEGRATING ORAL EVERY 8 HOURS PRN
Qty: 10 TABLET | Refills: 0 | Status: SHIPPED | OUTPATIENT
Start: 2020-01-01

## 2020-01-01 RX ORDER — DEXTROSE AND SODIUM CHLORIDE 5; .45 G/100ML; G/100ML
500 INJECTION, SOLUTION INTRAVENOUS CONTINUOUS
Status: DISCONTINUED | OUTPATIENT
Start: 2020-01-01 | End: 2020-01-01 | Stop reason: HOSPADM

## 2020-01-01 RX ORDER — DEXTROSE MONOHYDRATE 25 G/50ML
25 INJECTION, SOLUTION INTRAVENOUS ONCE
Status: COMPLETED | OUTPATIENT
Start: 2020-01-01 | End: 2020-01-01

## 2020-01-01 RX ORDER — CARVEDILOL 3.12 MG/1
TABLET ORAL
Qty: 60 TABLET | Refills: 1 | Status: SHIPPED | OUTPATIENT
Start: 2020-01-01 | End: 2020-01-01

## 2020-01-01 RX ORDER — HEPARIN SODIUM (PORCINE) LOCK FLUSH IV SOLN 100 UNIT/ML 100 UNIT/ML
500 SOLUTION INTRAVENOUS AS NEEDED
Status: DISCONTINUED | OUTPATIENT
Start: 2020-01-01 | End: 2020-01-01 | Stop reason: HOSPADM

## 2020-01-01 RX ORDER — DEXTROSE MONOHYDRATE 25 G/50ML
25 INJECTION, SOLUTION INTRAVENOUS
Status: DISCONTINUED | OUTPATIENT
Start: 2020-01-01 | End: 2020-01-01 | Stop reason: HOSPADM

## 2020-01-01 RX ORDER — DIPHENOXYLATE HYDROCHLORIDE AND ATROPINE SULFATE 2.5; .025 MG/1; MG/1
TABLET ORAL
Qty: 30 TABLET | Refills: 2 | OUTPATIENT
Start: 2020-01-01

## 2020-01-01 RX ORDER — ALUMINA, MAGNESIA, AND SIMETHICONE 2400; 2400; 240 MG/30ML; MG/30ML; MG/30ML
15 SUSPENSION ORAL EVERY 6 HOURS PRN
Status: DISCONTINUED | OUTPATIENT
Start: 2020-01-01 | End: 2020-01-01 | Stop reason: HOSPADM

## 2020-01-01 RX ORDER — ONDANSETRON HYDROCHLORIDE 8 MG/1
TABLET, FILM COATED ORAL
Qty: 20 TABLET | Refills: 2 | Status: SHIPPED | OUTPATIENT
Start: 2020-01-01 | End: 2020-01-01

## 2020-01-01 RX ORDER — LANOLIN ALCOHOL/MO/W.PET/CERES
400 CREAM (GRAM) TOPICAL DAILY
Qty: 30 TABLET | Refills: 3 | Status: SHIPPED | OUTPATIENT
Start: 2020-01-01 | End: 2020-01-01

## 2020-01-01 RX ORDER — CARVEDILOL 3.12 MG/1
3.12 TABLET ORAL 2 TIMES DAILY WITH MEALS
COMMUNITY

## 2020-01-01 RX ORDER — ONDANSETRON 2 MG/ML
4 INJECTION INTRAMUSCULAR; INTRAVENOUS EVERY 6 HOURS PRN
Status: DISCONTINUED | OUTPATIENT
Start: 2020-01-01 | End: 2020-01-01 | Stop reason: HOSPADM

## 2020-01-01 RX ORDER — DIPHENOXYLATE HYDROCHLORIDE AND ATROPINE SULFATE 2.5; .025 MG/1; MG/1
1-2 TABLET ORAL 4 TIMES DAILY PRN
Qty: 120 TABLET | Refills: 0 | Status: SHIPPED | OUTPATIENT
Start: 2020-01-01

## 2020-01-01 RX ORDER — ACETAMINOPHEN 650 MG/1
650 SUPPOSITORY RECTAL EVERY 4 HOURS PRN
Status: DISCONTINUED | OUTPATIENT
Start: 2020-01-01 | End: 2020-01-01 | Stop reason: HOSPADM

## 2020-01-01 RX ORDER — DIPHENOXYLATE HYDROCHLORIDE AND ATROPINE SULFATE 2.5; .025 MG/1; MG/1
TABLET ORAL
Qty: 30 TABLET | Refills: 0 | Status: SHIPPED | OUTPATIENT
Start: 2020-01-01 | End: 2020-01-01

## 2020-01-01 RX ORDER — NITROGLYCERIN 0.4 MG/1
0.4 TABLET SUBLINGUAL
Status: DISCONTINUED | OUTPATIENT
Start: 2020-01-01 | End: 2020-01-01 | Stop reason: HOSPADM

## 2020-01-01 RX ADMIN — DEXTROSE MONOHYDRATE 25 G: 25 INJECTION, SOLUTION INTRAVENOUS at 22:37

## 2020-01-01 RX ADMIN — SODIUM CHLORIDE 480 MG: 900 INJECTION, SOLUTION INTRAVENOUS at 14:24

## 2020-01-01 RX ADMIN — DEXTROSE 50 % IN WATER (D50W) INTRAVENOUS SYRINGE 25 G: at 22:37

## 2020-01-01 RX ADMIN — Medication 0.1 MCG/KG/MIN: at 23:24

## 2020-01-01 RX ADMIN — Medication 10 ML: at 15:56

## 2020-01-01 RX ADMIN — Medication 10 ML: at 15:01

## 2020-01-01 RX ADMIN — SODIUM CHLORIDE 500 ML: 0.9 INJECTION, SOLUTION INTRAVENOUS at 00:06

## 2020-01-01 RX ADMIN — DEXTROSE 50 % IN WATER (D50W) INTRAVENOUS SYRINGE 25 G: at 10:37

## 2020-01-01 RX ADMIN — HEPARIN SODIUM (PORCINE) LOCK FLUSH IV SOLN 100 UNIT/ML 500 UNITS: 100 SOLUTION at 13:59

## 2020-01-01 RX ADMIN — HEPARIN 500 UNITS: 100 SYRINGE at 15:03

## 2020-01-01 RX ADMIN — BEVACIZUMAB-AWWB 560 MG: 400 INJECTION, SOLUTION INTRAVENOUS at 14:07

## 2020-01-01 RX ADMIN — IOPAMIDOL 50 ML: 755 INJECTION, SOLUTION INTRAVENOUS at 13:00

## 2020-01-01 RX ADMIN — DEXTROSE MONOHYDRATE 25 G: 25 INJECTION, SOLUTION INTRAVENOUS at 10:37

## 2020-01-01 RX ADMIN — Medication 30 ML: at 13:51

## 2020-01-01 RX ADMIN — HEPARIN 500 UNITS: 100 SYRINGE at 14:56

## 2020-01-01 RX ADMIN — ALBUTEROL SULFATE 2.5 MG: 2.5 SOLUTION RESPIRATORY (INHALATION) at 23:01

## 2020-01-01 RX ADMIN — DEXTROSE MONOHYDRATE AND SODIUM CHLORIDE 500 ML: 5; .45 INJECTION, SOLUTION INTRAVENOUS at 11:10

## 2020-01-01 RX ADMIN — SODIUM CHLORIDE 500 ML: 0.9 INJECTION, SOLUTION INTRAVENOUS at 22:36

## 2020-01-01 RX ADMIN — CEFTAZIDIME: 1 INJECTION, POWDER, FOR SOLUTION INTRAMUSCULAR; INTRAVENOUS at 00:58

## 2020-01-01 RX ADMIN — HEPARIN 500 UNITS: 100 SYRINGE at 15:57

## 2020-01-06 NOTE — PROGRESS NOTES
Pt here today for Opdivo no complaints diarrhea has subsided. She questioned the need for Xgeva injection. She had it in November and it was noted on PN from Dr. Danielskked up pt Calcium and it was low in December at 7.5. CMP drawn today and pt. [facility] wants her to have it if after CMP results. She does admit to taking Calcium daily.

## 2020-01-07 NOTE — PROGRESS NOTES
Hematology/Oncology Outpatient Follow Up    Caroline Truong  1945    Primary Care Physician: Mercedez Dallas MD  Referring Physician: Mercedez Dallas*  Chief complaint:  Metastatic renal cell carcinoma.   Bone mets.    Left upper extremity unprovoked DVT.   History of Present Illness:   · Ms. Truong does not have a personal or family history of blood clotting disorder.  She reports about four weeks prior to presentation, she noticed swelling in her left arm. Slowly it was getting bigger and pain started, and she sought medical attention.    · 11/16/18 - Doppler ultrasound of the left upper extremity was obtained which revealed acute DVT involving the left subclavian vein, axillary vein and brachial vein. Superficial venous thrombosis involving the left basilic vein.    · 11/30/18 - VQ scan was obtained, which was low probability of PE.    · Patient was sent to the [facility] and seen initially on 12/5/18.  · 12/11/18 - CT scan of the chest, abdomen and pelvis done without contrast secondary to renal insufficiency showed multiple small pulmonary nodules.  Largest measuring 5 mm within the right lower lobe, indeterminate, but may represent small pulmonary metastasis.  Follow up recommended.  Enlarged AP window lymph node 1.4 x 2.8 cm.  Pathological fractures involving the left 1st rib and right 7th rib with underlying lytic lesions suspicious for osseous metastatic disease or myeloma.     · Exophytic lobular intermediate density lesion arising from the lower pole of the right kidney, suspicious for a primary neoplasm.  Evaluation limited due to lack of IV contrast.  Lytic lesions involving the posteromedial right acetabular wall and left iliac vein suspicious for osseous metastatic disease or myeloma.     · 12/21/18 - [place]-lambda free light chain ratio 0.98.  Serum electrophoresis normal. Serum immunofixation normal.  Urine immunofixation normal.  B12 495.  Beta 2  microglobulin 1.36.  Immunoglobulin G and M normal.  Haptoglobin 157 normal.  Ferritin 138.  Folic acid 13.1.  Creatinine 1.5.  Retic count 1.26.  Hemoccult negative x3.    · Patient was continued on indefinite Xarelto for her DVT.   2.   Oncologic History:    · 1/11/19 - Bone scan showed uptake within the posterior left 1st rib, posterior right 7th rib, L1 vertebral body and left iliac crest consistent with known lytic lesions seen on CT of 12/11/18 Consistent with metastatic disease.    · 1/24/19 - CT-guided biopsy of the left iliac crest positive for metastatic clear cell renal cell carcinoma.    · 2/4/19 - PET/CT scan showed hypermetabolic 2.3 x 4.6 cm right renal mass most consistent with renal cell carcinoma.  An adjacent 12 mm right for renal soft tissue nodule consistent with metastatic nodule.  Right retroperitoneal hypermetabolic lymphadenopathy.  Multiple small pulmonary nodules worrisome for hematogenous pulmonary metastases.  Multiple lytic metastases in the chest, abdomen and pelvis which have developed or enlarged since December 2018.   · 2/18/19 - Patient started Votrient 800 mg to take once a day.   · 3/4/19 - CT scan of the chest with PE protocol.  No evidence of PE.  Worsening of metastatic disease, but no definite new lesions.  No fractures.  Stable tiny bilateral pulmonary nodules.    · 3/11/19 - Patient’s chemotherapy was switched to weekly Torisel at 25 mg (Votrient was stopped secondary to tumor pain).   · 5/28/19 - PET/CT scan showed overall appears to be improvement from prior exam, specifically multiple pulmonary nodules have improved have prior study and mediastinal lymphadenopathy has improved.  There appears to be interval central necrosis involving an osseous metastasis along the left iliac vein.  4.1 cm right renal mass demonstrates suggestion of some central necrosis.  A previously described lymph node anterior to the psoas muscle is no longer well visualized.  A right  retroperitoneal FDG avid lymph node and an FDG avid soft tissue nodule in the rightpararenal space appears stable.    · 7/23/2019 - CT of upper extremity left without contrast-1. Destructive lesion present within the distal humerus as well as the proximal ulna consistent with metastatic disease. 2. Osseous metastasis within the bilateral ribs and spine as above.  · 3. Small bilateral pleural effusions with presence of cardiomegaly. Patchy airspace disease is present within the lung bases bilaterally which may be partially related to atelectasis and/or pneumonia.Evaluation limited due to respiratory motion.   · 7/28/2019 patient was initiated on external beam radiation therapy for palliative measures to control pain on her left elbow where there was destructive bone lesion in the distal humerus as well as proximal ulna consistent with metastatic disease.  30 Gy in 10 fractions  · 8/19/2019 secondary to progression of disease chemotherapy was switched to nivolumab and ipilimumab.  Treatment complicated with grade 4 colitis had to be treated with the steroids.  · 12/30/2019 CT scan of the chest abdomen and pelvis with contrast were obtained.. The right lower renal pole mass has diminished in size. It now has more fluid density or necrotic appearance than on prior study. 2. Osseous metastatic disease is stable. No new osseous lesions. 3. Resolution of metastatic disease in the right pararenal space and the right retroperitoneum since 7/24/2019 and 5/20/2019. 4. 3 mm right lower lobe pulmonary nodule is slightly smaller than on 12/11/2019. No evidence of progressive pulmonary metastatic disease. 6. Numerous tiny low-density lesions are seen within the liver parenchyma, statistically favored to represent cysts but too small to characterize. These are not visible on previous CT abdomen examinations, but may not visible due to lack of IV contrast. Metastatic disease thought less likely. Continued attention at CT abdomen  surveillance imaging is recommended.     ·     Past Medical History:   Diagnosis Date   • Abnormal laboratory test 12/07/2018   • Anemia 12/11/2018   • Atrioventricular block, complete (CMS/HCC) 08/29/2014   • Bilateral leg edema 09/09/2014   • Bradycardia 08/29/2014   • Chest discomfort 08/24/2014   • Clear cell carcinoma of kidney (CMS/[facility]) 01/28/2019   • Deep venous thrombosis of arm (CMS/[facility]) 11/19/2018   • Diabetes mellitus (CMS/[facility]) 10/22/2018   • Dyspnea 10/15/2018   • Dyspnea 08/29/2014   • Fatigue 10/15/2018   • Hypertension 08/29/2014   • Localized swelling, left upper limb 11/16/2018   • Osteopenia 10/30/2018   • Overweight 10/15/2018   • Pacemaker 09/30/2014    permanent   • Postmenopausal status 10/15/2018   • Preop examination 11/30/2018   • Renal insufficiency 12/11/2018       Past Surgical History:   Procedure Laterality Date   • CARDIAC CATHETERIZATION  03/14/2017   • PACEMAKER IMPLANTATION  09/18/2014    Dual chamber- BS   • PACEMAKER IMPLANTATION     • TOTAL ABDOMINAL HYSTERECTOMY  2000         Current Outpatient Medications:   •  calcium carbonate (TUMS) 500 MG chewable tablet, Chew 1,000 mg 3 (Three) Times a Day., Disp: 180 tablet, Rfl: 2  •  carvedilol (COREG) 3.125 MG tablet, Take 3.125 mg by mouth 2 (Two) Times a Day With Meals., Disp: , Rfl:   •  Cholecalciferol (VITAMIN D3) 125 MCG (5000 UT) capsule capsule, Take 1 capsule by mouth Daily., Disp: 30 capsule, Rfl: 0  •  diphenoxylate-atropine (LOMOTIL) 2.5-0.025 MG per tablet, Take 1 tablet by mouth 4 (Four) Times a Day As Needed for Diarrhea., Disp: 30 tablet, Rfl: 0  •  fenofibrate 160 MG tablet, Take 160 mg by mouth Daily., Disp: , Rfl:   •  hydrALAZINE (APRESOLINE) 100 MG tablet, Take 100 mg by mouth 3 (Three) Times a Day., Disp: , Rfl:   •  Insulin Glargine (LANTUS SOLOSTAR) 100 UNIT/ML injection pen, Inject 7 Units under the skin into the appropriate area as directed Daily., Disp: 15 pen, Rfl: 0  •  K Phos Salinas-Sod Phos Di & Salinas  (PHOSPHA 250 NEUTRAL) 155-852-130 MG tablet, , Disp: , Rfl:   •  lidocaine-prilocaine (EMLA) 2.5-2.5 % cream, APPLY TO AFFECTED AREA(S) AROUND PORT 30-60 MINUTES PRIOR TO PORT USE, Disp: 1 each, Rfl: 2  •  lisinopril (PRINIVIL,ZESTRIL) 5 MG tablet, Take 5 mg by mouth 2 (Two) Times a Day., Disp: , Rfl:   •  Magnesium Oxide 400 (240 Mg) MG tablet, Take 1 tablet by mouth Daily., Disp: 30 tablet, Rfl: 3  •  metoclopramide (REGLAN) 10 MG tablet, Take 10 mg by mouth 4 (Four) Times a Day Before Meals & at Bedtime., Disp: , Rfl:   •  ondansetron (ZOFRAN) 8 MG tablet, TAKE ONE TABLET BY MOUTH EVERY 8 HOURS AS NEEDED FOR NAUSEA AND VOMITING, Disp: 20 tablet, Rfl: 3  •  pantoprazole (PROTONIX) 20 MG EC tablet, Take 1 tablet by mouth Daily., Disp: 30 tablet, Rfl: 3  •  potassium chloride (K-DUR) 10 MEQ CR tablet, , Disp: , Rfl:   •  potassium chloride (K-DUR,KLOR-CON) 10 MEQ CR tablet, Take 1 tablet by mouth Daily., Disp: 30 tablet, Rfl: 3  •  predniSONE (DELTASONE) 10 MG tablet, Take 3 tablets by mouth 2 (Two) Times a Day. Or as directed by MD, Disp: 180 tablet, Rfl: 0  •  rivaroxaban (XARELTO) 20 MG tablet, Take 20 mg by mouth Daily., Disp: , Rfl:   •  vitamin B-12 (VITAMIN B-12) 1000 MCG tablet, Take 1 tablet by mouth Daily., Disp: 30 tablet, Rfl: 0  •  XARELTO 20 MG tablet, TAKE ONE TABLET BY MOUTH DAILY, Disp: 30 tablet, Rfl: 3  •  Zinc Gluconate 50 MG capsule, Take 50 mg by mouth Daily., Disp: , Rfl:   No current facility-administered medications for this visit.     Allergies   Allergen Reactions   • Levaquin [Levofloxacin] Delirium       Family History   Problem Relation Age of Onset   • Hypertension Mother        Cancer-related family history is not on file.    Social History     Tobacco Use   • Smoking status: Never Smoker   • Smokeless tobacco: Never Used   Substance Use Topics   • Alcohol use: No     Frequency: Never   • Drug use: No       I have reviewed the history of present illness, past medical history, family  history, social history, lab results, all notes and other records since the patient was last seen at the [facility] center  Subjective     Patient is my office for follow-up accompanied by her friend.  Diarrhea has resolved with steroid usage for 6 weeks..  Patient is slowly gaining her strength back.  He received 1 dose of Opdivo so far.  No diarrhea experienced so far.  Pain is better.  Needing pain medication.      ROS:       Review of Systems   Constitutional: Negative for fever.   HENT: Negative for nosebleeds and trouble swallowing.    Eyes: Negative for visual disturbance.   Respiratory: Negative for cough, shortness of breath and wheezing.    Cardiovascular: Negative for chest pain.   Gastrointestinal: Negative for abdominal pain and blood in stool.   Endocrine: Negative for cold intolerance.   Genitourinary: Negative for dysuria and hematuria.   Musculoskeletal: Negative for joint swelling.   Skin: Negative for rash.   Allergic/Immunologic: Negative for environmental allergies.   Neurological: Negative for seizures.   Hematological: Does not bruise/bleed easily.   Psychiatric/Behavioral: The patient is not nervous/anxious.          Objective:       There were no vitals filed for this visit.      Physical Exam   Constitutional: She is oriented to person, place, and time. No distress.   HENT:   Head: Normocephalic and atraumatic.   Eyes: Conjunctivae and EOM are normal. Right eye exhibits no discharge. Left eye exhibits no discharge. No scleral icterus.   Neck: Trachea normal and normal range of motion. Neck supple. No thyromegaly present.   Cardiovascular: Normal rate, regular rhythm, S1 normal, S2 normal and normal heart sounds. Exam reveals no gallop and no friction rub.   Pulmonary/Chest: Effort normal and breath sounds normal. No stridor. No respiratory distress. She has no wheezes.   Abdominal: Soft. Bowel sounds are normal. She exhibits no mass. There is no hepatosplenomegaly. There is no tenderness.  There is no rebound and no guarding.   Musculoskeletal: Normal range of motion. She exhibits no tenderness.   Left upper extremity swollen, swelling and tenderness of the left elbow, pulses palpable       Lymphadenopathy:     She has no cervical adenopathy.   Neurological: She is alert and oriented to person, place, and time. She has normal strength. She exhibits normal muscle tone.   Skin: Skin is warm and dry. No rash noted. She is not diaphoretic. No erythema.   Psychiatric: She has a normal mood and affect. Her speech is normal and behavior is normal.   Nursing note and vitals reviewed.    Left upper extremity swelling  RECENT LABS:     WBC   Date Value Ref Range Status   01/06/2020 4.59 3.40 - 10.80 10*3/mm3 Final     RBC   Date Value Ref Range Status   01/06/2020 3.62 (L) 3.77 - 5.28 10*6/mm3 Final     Hemoglobin   Date Value Ref Range Status   01/06/2020 11.6 (L) 12.0 - 15.9 g/dL Final     Hematocrit   Date Value Ref Range Status   01/06/2020 34.6 34.0 - 46.6 % Final     MCV   Date Value Ref Range Status   01/06/2020 95.6 79.0 - 97.0 fL Final     MCH   Date Value Ref Range Status   01/06/2020 32.0 26.6 - 33.0 pg Final     MCHC   Date Value Ref Range Status   01/06/2020 33.5 31.5 - 35.7 g/dL Final     RDW   Date Value Ref Range Status   01/06/2020 15.3 12.3 - 15.4 % Final     RDW-SD   Date Value Ref Range Status   01/06/2020 51.9 37.0 - 54.0 fl Final     MPV   Date Value Ref Range Status   01/06/2020 9.5 6.0 - 12.0 fL Final     Platelets   Date Value Ref Range Status   01/06/2020 250 140 - 450 10*3/mm3 Final     Neutrophil %   Date Value Ref Range Status   01/06/2020 64.8 42.7 - 76.0 % Final     Lymphocyte %   Date Value Ref Range Status   01/06/2020 17.0 (L) 19.6 - 45.3 % Final     Monocyte %   Date Value Ref Range Status   01/06/2020 16.1 (H) 5.0 - 12.0 % Final     Eosinophil %   Date Value Ref Range Status   01/06/2020 1.7 0.3 - 6.2 % Final     Basophil %   Date Value Ref Range Status   01/06/2020 0.4 0.0 -  1.5 % Final     Neutrophils, Absolute   Date Value Ref Range Status   01/06/2020 2.97 1.70 - 7.00 10*3/mm3 Final     Lymphocytes, Absolute   Date Value Ref Range Status   01/06/2020 0.78 0.70 - 3.10 10*3/mm3 Final     Monocytes, Absolute   Date Value Ref Range Status   01/06/2020 0.74 0.10 - 0.90 10*3/mm3 Final     Eosinophils, Absolute   Date Value Ref Range Status   01/06/2020 0.08 0.00 - 0.40 10*3/mm3 Final     Basophils, Absolute   Date Value Ref Range Status   01/06/2020 0.02 0.00 - 0.20 10*3/mm3 Final     nRBC   Date Value Ref Range Status   11/12/2019 0.0 0.0 - 0.2 /100 WBC Final       Lab Results   Component Value Date    GLUCOSE 117 (H) 01/06/2020    BUN 11 01/06/2020    CREATININE 0.66 01/06/2020    EGFRIFNONA 88 01/06/2020    EGFRIFAFRI 34 (L) 05/31/2019    BCR 16.7 01/06/2020    K 3.2 (L) 01/06/2020    CO2 21.0 (L) 01/06/2020    CALCIUM 8.3 (L) 01/06/2020    ALBUMIN 3.10 (L) 01/06/2020    LABIL2 1.1 06/10/2019    AST 34 (H) 01/06/2020    ALT 27 01/06/2020         Assessment/Plan      ASSESSMENT:   1. Metastatic renal cell carcinoma  2. Left upper extremity unprovoked DVT  3. Bone metastatic disease  4. Destructive bone lesion in the left distal humerus and proximal ulna  5. Type 2 diabetes  6. Chemotherapy-induced anemia responded well to Opdivo and [place]  7. ECOG 2      PLAN:      1. Patient currently on maintenance Opdivo, watch for diarrhea at this time I will will continue it at this time.  2. I discussed the CT scan results with the patient and her friend which shows excellent response and disease to be in remission at this time.  Even though she developed a severe colitis requiring steroid treatment.  3.  Patient is on Xarelto it was restarted by Dr. Galo after the hematuria was resolved   4. Continue the fentanyl patch and Lortab as needed.  Pain meds refilled  5. Hold off Xgeva for now patient had hypocalcemia.  Patient was instructed to start taking Tums at least 5-6 a day  6. Patient has a  device on her left elbow which is helping her preventing fractures  7. Diabetic foot ulcer  healed up.  Schedule patient to control her blood sugars well  8. Hemoglobin is low.  Observe.  No growth factors at this time.  9. Continue calcium replacement with Tums  10. Stop Reglan and magnesium  11. I will see her back in my office in 2 weeks recheck CBC CMP at that time.    12. Stop weekly CBC for convenience.  13. I have reviewed labs results, imaging, vitals, and medications with the patient and her friend  Today.  14. Patient and her friend verbalized understanding and is in agreement of the above plans.     This report was compiled using Dragon voice recognition software. I have made every effort to proof read this document; however, typographical errors may persist

## 2020-01-07 NOTE — TELEPHONE ENCOUNTER
----- Message from TOMMY Guerra sent at 1/6/2020  5:32 PM EST -----  Ask patient to increase potassium chloride to 20 mEq daily.

## 2020-01-07 NOTE — PROGRESS NOTES
Case Management/ Note    Patient Name: Caroline Truong  YOB: 1945  MRN #: [id number]    OSW met with patient and her friend, Sarah Almanzar at her request. She is alert and oriented to person, place and time. She said her meals to go packaging is faulty and the paper cover comes off exposing the food. In addition, she said her medic alert from guardian is malfunctioning. She spoke with them and while upset told them to cancel the service; she would like the service reinstated. Also, she said she now has full medicaid and is asking about transportation services.     OSW educated patient as to how to use transportation services. OSW called Linda at [facility] regarding the meals to go. In addition, sent a secure email regarding medic alert and informing her that patient is now full medicaid; asked if patient could receive additional services. She will reach out to the patient.     They spoke of Dr. Fenton telling her that she was in remission and both are very happy, and grateful. OSW will remain available.     Electronically signed by:   Luz Maria Ulrich LCSW, OSW-C  01/07/20, 2:17 PM

## 2020-01-13 NOTE — TELEPHONE ENCOUNTER
Pt called questioning what time her appt is on 1/14/20.   Advised pt that her appt is at 1:05pm.   Pt v/u.

## 2020-01-14 NOTE — TELEPHONE ENCOUNTER
Patient called requested the nurse to call her, she is requesting for a steroid but would like to discuss first. [phone] home.

## 2020-01-14 NOTE — TELEPHONE ENCOUNTER
Called and spoke with patient.  She states her diarrhea is back.  I informed her that Lomotil was being eprescribed.  She v/u.

## 2020-01-22 NOTE — TELEPHONE ENCOUNTER
"Pt with complaints of diarrhea 6-8 times/day.  Pt states that she was taking Prednisone and Lomotil.  Pt states that Dr. Fenton took her off the magnesium \"a while back.\"    Discussed with Rachele VALENTIN. Orders to refill Lomotil, and refill Prednisone with change to 30mg daily.     Notified pt who v/u. Confirmed pharmacy and meds were sent via escribe.     "

## 2020-01-23 NOTE — TELEPHONE ENCOUNTER
Received fax from [employer], that predsion not covered by insurer.Completed P/a-will rneetta.

## 2020-01-24 NOTE — TELEPHONE ENCOUNTER
Patient called office inquiring why her meds had not been sent to her pharmacy for diarrhea. Viewed previous note by Lottie Kat and meds were in fact sent to patient pharmacy and have been filled.

## 2020-01-24 NOTE — TELEPHONE ENCOUNTER
[facility] sent us notification of PA for prednisone. PA submitted through [employer]. Insurer denied. A PA isn't normally required and yet this office had several all on the same day. I called [facility] pharmacy informing them of denial. Medication is inexpensive enough that patient should be able to pay for on her own.

## 2020-02-04 NOTE — PROGRESS NOTES
Hematology/Oncology Outpatient Follow Up    Caroline Truong  1945    Primary Care Physician: Mercedez Dallas MD  Referring Physician: Mercedez Dallas*  Chief complaint:  Metastatic renal cell carcinoma.   Bone mets.    Left upper extremity unprovoked DVT.   History of Present Illness:   · Ms. Truong does not have a personal or family history of blood clotting disorder.  She reports about four weeks prior to presentation, she noticed swelling in her left arm. Slowly it was getting bigger and pain started, and she sought medical attention.    · 11/16/18 - Doppler ultrasound of the left upper extremity was obtained which revealed acute DVT involving the left subclavian vein, axillary vein and brachial vein. Superficial venous thrombosis involving the left basilic vein.    · 11/30/18 - VQ scan was obtained, which was low probability of PE.    · Patient was sent to the [facility] and seen initially on 12/5/18.  · 12/11/18 - CT scan of the chest, abdomen and pelvis done without contrast secondary to renal insufficiency showed multiple small pulmonary nodules.  Largest measuring 5 mm within the right lower lobe, indeterminate, but may represent small pulmonary metastasis.  Follow up recommended.  Enlarged AP window lymph node 1.4 x 2.8 cm.  Pathological fractures involving the left 1st rib and right 7th rib with underlying lytic lesions suspicious for osseous metastatic disease or myeloma.     · Exophytic lobular intermediate density lesion arising from the lower pole of the right kidney, suspicious for a primary neoplasm.  Evaluation limited due to lack of IV contrast.  Lytic lesions involving the posteromedial right acetabular wall and left iliac vein suspicious for osseous metastatic disease or myeloma.     · 12/21/18 - [place]-lambda free light chain ratio 0.98.  Serum electrophoresis normal. Serum immunofixation normal.  Urine immunofixation normal.  B12 495.  Beta 2  microglobulin 1.36.  Immunoglobulin G and M normal.  Haptoglobin 157 normal.  Ferritin 138.  Folic acid 13.1.  Creatinine 1.5.  Retic count 1.26.  Hemoccult negative x3.    · Patient was continued on indefinite Xarelto for her DVT.   2.   Oncologic History:    · 1/11/19 - Bone scan showed uptake within the posterior left 1st rib, posterior right 7th rib, L1 vertebral body and left iliac crest consistent with known lytic lesions seen on CT of 12/11/18 Consistent with metastatic disease.    · 1/24/19 - CT-guided biopsy of the left iliac crest positive for metastatic clear cell renal cell carcinoma.    · 2/4/19 - PET/CT scan showed hypermetabolic 2.3 x 4.6 cm right renal mass most consistent with renal cell carcinoma.  An adjacent 12 mm right for renal soft tissue nodule consistent with metastatic nodule.  Right retroperitoneal hypermetabolic lymphadenopathy.  Multiple small pulmonary nodules worrisome for hematogenous pulmonary metastases.  Multiple lytic metastases in the chest, abdomen and pelvis which have developed or enlarged since December 2018.   · 2/18/19 - Patient started Votrient 800 mg to take once a day.   · 3/4/19 - CT scan of the chest with PE protocol.  No evidence of PE.  Worsening of metastatic disease, but no definite new lesions.  No fractures.  Stable tiny bilateral pulmonary nodules.    · 3/11/19 - Patient’s chemotherapy was switched to weekly Torisel at 25 mg (Votrient was stopped secondary to tumor pain).   · 5/28/19 - PET/CT scan showed overall appears to be improvement from prior exam, specifically multiple pulmonary nodules have improved have prior study and mediastinal lymphadenopathy has improved.  There appears to be interval central necrosis involving an osseous metastasis along the left iliac vein.  4.1 cm right renal mass demonstrates suggestion of some central necrosis.  A previously described lymph node anterior to the psoas muscle is no longer well visualized.  A right  retroperitoneal FDG avid lymph node and an FDG avid soft tissue nodule in the rightpararenal space appears stable.    · 7/23/2019 - CT of upper extremity left without contrast-1. Destructive lesion present within the distal humerus as well as the proximal ulna consistent with metastatic disease. 2. Osseous metastasis within the bilateral ribs and spine as above.  · 3. Small bilateral pleural effusions with presence of cardiomegaly. Patchy airspace disease is present within the lung bases bilaterally which may be partially related to atelectasis and/or pneumonia.Evaluation limited due to respiratory motion.   · 7/28/2019 patient was initiated on external beam radiation therapy for palliative measures to control pain on her left elbow where there was destructive bone lesion in the distal humerus as well as proximal ulna consistent with metastatic disease.  30 Gy in 10 fractions  · 8/19/2019 secondary to progression of disease chemotherapy was switched to nivolumab and ipilimumab.  Treatment complicated with grade 4 colitis had to be treated with the steroids.  · 12/30/2019 CT scan of the chest abdomen and pelvis with contrast were obtained.. The right lower renal pole mass has diminished in size. It now has more fluid density or necrotic appearance than on prior study. 2. Osseous metastatic disease is stable. No new osseous lesions. 3. Resolution of metastatic disease in the right pararenal space and the right retroperitoneum since 7/24/2019 and 5/20/2019. 4. 3 mm right lower lobe pulmonary nodule is slightly smaller than on 12/11/2019. No evidence of progressive pulmonary metastatic disease. 6. Numerous tiny low-density lesions are seen within the liver parenchyma, statistically favored to represent cysts but too small to characterize. These are not visible on previous CT abdomen examinations, but may not visible due to lack of IV contrast. Metastatic disease thought less likely. Continued attention at CT abdomen  surveillance imaging is recommended.  · On 6/20/2020 and 1/14/2020 patient received 2 doses of single agent Opdivo.  After second dose of Opdivo patient developed severe diarrhea leading to dehydration and weight loss.     ·     Past Medical History:   Diagnosis Date   • Abnormal laboratory test 12/07/2018   • Anemia 12/11/2018   • Atrioventricular block, complete (CMS/HCC) 08/29/2014   • Bilateral leg edema 09/09/2014   • Bradycardia 08/29/2014   • Chest discomfort 08/24/2014   • Clear cell carcinoma of kidney (CMS/HCC) 01/28/2019   • Deep venous thrombosis of arm (CMS/[facility]) 11/19/2018   • Diabetes mellitus (CMS/[facility]) 10/22/2018   • Dyspnea 10/15/2018   • Dyspnea 08/29/2014   • Fatigue 10/15/2018   • Hypertension 08/29/2014   • Localized swelling, left upper limb 11/16/2018   • Osteopenia 10/30/2018   • Overweight 10/15/2018   • Pacemaker 09/30/2014    permanent   • Postmenopausal status 10/15/2018   • Preop examination 11/30/2018   • Renal insufficiency 12/11/2018       Past Surgical History:   Procedure Laterality Date   • CARDIAC CATHETERIZATION  03/14/2017   • PACEMAKER IMPLANTATION  09/18/2014    Dual chamber- BS   • PACEMAKER IMPLANTATION     • TOTAL ABDOMINAL HYSTERECTOMY  2000         Current Outpatient Medications:   •  calcium carbonate (TUMS) 500 MG chewable tablet, Chew 1,000 mg 3 (Three) Times a Day., Disp: 180 tablet, Rfl: 2  •  carvedilol (COREG) 3.125 MG tablet, TAKE ONE TABLET BY MOUTH TWICE A DAY, Disp: 60 tablet, Rfl: 1  •  Cholecalciferol (VITAMIN D3) 125 MCG (5000 UT) capsule capsule, Take 1 capsule by mouth Daily., Disp: 30 capsule, Rfl: 0  •  diphenoxylate-atropine (LOMOTIL) 2.5-0.025 MG per tablet, TAKE ONE TABLET BY MOUTH FOUR TIMES A DAY AS NEEDED FOR DIARRHEA, Disp: 30 tablet, Rfl: 0  •  fenofibrate 160 MG tablet, Take 160 mg by mouth Daily., Disp: , Rfl:   •  hydrALAZINE (APRESOLINE) 100 MG tablet, Take 100 mg by mouth 3 (Three) Times a Day., Disp: , Rfl:   •  Insulin Glargine (LANTUS  SOLOSTAR) 100 UNIT/ML injection pen, Inject 7 Units under the skin into the appropriate area as directed Daily., Disp: 15 pen, Rfl: 0  •  K Phos [place]-Sod Phos Di & Mono (PHOSPHA 250 NEUTRAL) 155-852-130 MG tablet, , Disp: , Rfl:   •  lidocaine-prilocaine (EMLA) 2.5-2.5 % cream, APPLY TO AFFECTED AREA(S) AROUND PORT 30-60 MINUTES PRIOR TO PORT USE, Disp: 1 each, Rfl: 2  •  lisinopril (PRINIVIL,ZESTRIL) 5 MG tablet, Take 5 mg by mouth 2 (Two) Times a Day., Disp: , Rfl:   •  Magnesium Oxide 400 (240 Mg) MG tablet, Take 1 tablet by mouth Daily., Disp: 30 tablet, Rfl: 3  •  metoclopramide (REGLAN) 10 MG tablet, Take 10 mg by mouth 4 (Four) Times a Day Before Meals & at Bedtime., Disp: , Rfl:   •  ondansetron (ZOFRAN) 8 MG tablet, TAKE ONE TABLET BY MOUTH EVERY 8 HOURS AS NEEDED FOR NAUSEA AND VOMITING, Disp: 20 tablet, Rfl: 3  •  pantoprazole (PROTONIX) 20 MG EC tablet, Take 1 tablet by mouth Daily., Disp: 30 tablet, Rfl: 3  •  potassium chloride (K-DUR) 10 MEQ CR tablet, , Disp: , Rfl:   •  potassium chloride (K-DUR,KLOR-CON) 10 MEQ CR tablet, Take 1 tablet by mouth Daily., Disp: 30 tablet, Rfl: 3  •  predniSONE (DELTASONE) 10 MG tablet, Take 3 tablets by mouth Daily. Or as directed by MD, Disp: 180 tablet, Rfl: 0  •  rivaroxaban (XARELTO) 20 MG tablet, Take 20 mg by mouth Daily., Disp: , Rfl:   •  vitamin B-12 (VITAMIN B-12) 1000 MCG tablet, Take 1 tablet by mouth Daily., Disp: 30 tablet, Rfl: 0  •  XARELTO 20 MG tablet, TAKE ONE TABLET BY MOUTH DAILY, Disp: 30 tablet, Rfl: 3  •  Zinc Gluconate 50 MG capsule, Take 50 mg by mouth Daily., Disp: , Rfl:   No current facility-administered medications for this visit.     Facility-Administered Medications Ordered in Other Visits:   •  heparin injection 500 Units, 500 Units, Intravenous, PRN, Franklin García MD  •  sodium chloride 0.9 % flush 10 mL, 10 mL, Intravenous, PRN, Franklin García MD    Allergies   Allergen Reactions   • Levaquin [Levofloxacin] Delirium  "      Family History   Problem Relation Age of Onset   • Hypertension Mother        Cancer-related family history is not on file.    Social History     Tobacco Use   • Smoking status: Never Smoker   • Smokeless tobacco: Never Used   Substance Use Topics   • Alcohol use: No     Frequency: Never   • Drug use: No       I have reviewed the history of present illness, past medical history, family history, social history, lab results, all notes and other records since the patient was last seen at the [facility]  Subjective     Patient is my office for follow-up accompanied by her friend.  Diarrhea has resolved now.  Patient lost a significant amount of weight with the diarrhea.  Pain is controlled with medications..  Trying to increase her p.o. intake.  Still has loose stools but not watery explosive diarrhea like she had before.  She has been off the chemotherapy for 4 to 6 weeks.  Slowly gaining her strength now.  Requiring pain medications for pain control      ROS:       Review of Systems   Constitutional: Negative for fever.   HENT: Negative for nosebleeds and trouble swallowing.    Eyes: Negative for visual disturbance.   Respiratory: Negative for cough, shortness of breath and wheezing.    Cardiovascular: Negative for chest pain.   Gastrointestinal: Negative for abdominal pain and blood in stool.   Endocrine: Negative for cold intolerance.   Genitourinary: Negative for dysuria and hematuria.   Musculoskeletal: Negative for joint swelling.   Skin: Negative for rash.   Allergic/Immunologic: Negative for environmental allergies.   Neurological: Negative for seizures.   Hematological: Does not bruise/bleed easily.   Psychiatric/Behavioral: The patient is not nervous/anxious.          Objective:       Vitals:    02/04/20 1111   BP: 108/67   Pulse: 77   Resp: 24   Weight: 55.6 kg (122 lb 9.6 oz)   Height: 170.2 cm (67\")         Physical Exam   Constitutional: She is oriented to person, place, and time. No " distress.   Moderately built well-nourished   HENT:   Head: Normocephalic and atraumatic.   Eyes: Conjunctivae and EOM are normal. Right eye exhibits no discharge. Left eye exhibits no discharge. No scleral icterus.   Neck: Trachea normal and normal range of motion. Neck supple. No thyromegaly present.   Cardiovascular: Normal rate, regular rhythm, S1 normal, S2 normal and normal heart sounds. Exam reveals no gallop and no friction rub.   Pulmonary/Chest: Effort normal and breath sounds normal. No stridor. No respiratory distress. She has no wheezes.   Abdominal: Soft. Bowel sounds are normal. She exhibits no mass. There is no hepatosplenomegaly. There is no tenderness. There is no rebound and no guarding.   Musculoskeletal: Normal range of motion. She exhibits no tenderness.   Left upper extremity swollen, swelling and tenderness of the left elbow, pulses palpable       Lymphadenopathy:     She has no cervical adenopathy.   Neurological: She is alert and oriented to person, place, and time. She has normal strength. She exhibits normal muscle tone.   Skin: Skin is warm and dry. No rash noted. She is not diaphoretic. No erythema.   Psychiatric: She has a normal mood and affect. Her speech is normal and behavior is normal.   Nursing note and vitals reviewed.    Left upper extremity swelling  RECENT LABS:     WBC   Date Value Ref Range Status   02/04/2020 5.10 3.40 - 10.80 10*3/mm3 Final     RBC   Date Value Ref Range Status   02/04/2020 3.65 (L) 3.77 - 5.28 10*6/mm3 Final     Hemoglobin   Date Value Ref Range Status   02/04/2020 11.4 (L) 12.0 - 15.9 g/dL Final     Hematocrit   Date Value Ref Range Status   02/04/2020 35.1 34.0 - 46.6 % Final     MCV   Date Value Ref Range Status   02/04/2020 96.2 79.0 - 97.0 fL Final     MCH   Date Value Ref Range Status   02/04/2020 31.2 26.6 - 33.0 pg Final     MCHC   Date Value Ref Range Status   02/04/2020 32.5 31.5 - 35.7 g/dL Final     RDW   Date Value Ref Range Status    02/04/2020 13.7 12.3 - 15.4 % Final     RDW-SD   Date Value Ref Range Status   02/04/2020 46.6 37.0 - 54.0 fl Final     MPV   Date Value Ref Range Status   02/04/2020 8.3 6.0 - 12.0 fL Final     Platelets   Date Value Ref Range Status   02/04/2020 191 140 - 450 10*3/mm3 Final     Neutrophil %   Date Value Ref Range Status   02/04/2020 68.1 42.7 - 76.0 % Final     Lymphocyte %   Date Value Ref Range Status   02/04/2020 15.1 (L) 19.6 - 45.3 % Final     Monocyte %   Date Value Ref Range Status   02/04/2020 13.5 (H) 5.0 - 12.0 % Final     Eosinophil %   Date Value Ref Range Status   02/04/2020 2.9 0.3 - 6.2 % Final     Basophil %   Date Value Ref Range Status   02/04/2020 0.4 0.0 - 1.5 % Final     Neutrophils, Absolute   Date Value Ref Range Status   02/04/2020 3.47 1.70 - 7.00 10*3/mm3 Final     Lymphocytes, Absolute   Date Value Ref Range Status   02/04/2020 0.77 0.70 - 3.10 10*3/mm3 Final     Monocytes, Absolute   Date Value Ref Range Status   02/04/2020 0.69 0.10 - 0.90 10*3/mm3 Final     Eosinophils, Absolute   Date Value Ref Range Status   02/04/2020 0.15 0.00 - 0.40 10*3/mm3 Final     Basophils, Absolute   Date Value Ref Range Status   02/04/2020 0.02 0.00 - 0.20 10*3/mm3 Final     nRBC   Date Value Ref Range Status   11/12/2019 0.0 0.0 - 0.2 /100 WBC Final       Lab Results   Component Value Date    GLUCOSE 135 (H) 01/14/2020    BUN 27 (H) 01/14/2020    CREATININE 1.18 (H) 01/14/2020    EGFRIFNONA 45 (L) 01/14/2020    EGFRIFAFRI 34 (L) 05/31/2019    BCR 22.9 01/14/2020    K 4.8 01/14/2020    CO2 22.0 01/14/2020    CALCIUM 8.2 (L) 01/14/2020    ALBUMIN 2.70 (L) 01/14/2020    LABIL2 1.1 06/10/2019    AST 62 (H) 01/14/2020    ALT 50 (H) 01/14/2020         Assessment/Plan      ASSESSMENT:   1. Metastatic renal cell carcinoma  2. Left upper extremity unprovoked DVT  3. Bone metastatic disease  4. Destructive bone lesion in the left distal humerus and proximal ulna  5. Type 2 diabetes  6. Chemotherapy-induced  diarrhea  7. ECOG 1      PLAN:      1. Patient did poorly with Opdivo.  Patient developed grade 3 diarrhea with Opdivo.  At this point will drop Opdivo switch her chemotherapy to single agent Avastin.  I discussed with her about side effects of Avastin including her blood pressure proteinuria and bleeding risks.  2. Her CT scans showed very good improvement with Opdivo  but patient could not tolerate secondary to the diarrhea and colitis requiring treatment with steroids.  3. Continue Xarelto.  No evidence of bleeding at this time.  Patient report to us if she notices any swelling  4. Continue the fentanyl patch and Lortab as needed  5. Continue Xgeva  6. Left elbow pain is improved continue to use the device  7. Diabetic foot ulcer  healed up.  8. Hemoglobin is normal no further weekly CBC.  9. Continue calcium replacement with Tums  10. Stop Reglan and magnesium which caused the diarrhea and she is off of it now  11. I will see her back in my office in 4 weeks recheck CBC CMP at that time.    12. Stop weekly CBC  13. I have reviewed labs results, imaging, vitals, and medications with the patient and her friend  today.      Patient and her friend verbalized understanding and is in agreement of the above plans.     This report was compiled using Dragon voice recognition software. I have made every effort to proof read this document; however, typographical errors may persist    Addendum electronically signed by Franklin García MD, 03/24/20, 7:04 PM.

## 2020-02-05 NOTE — PROGRESS NOTES
REMOTE DEVICE INTERROGATION    Received and reviewed on:   2/4/2020    Remote device interrogation is reviewed.     Device Company: [employer]    Battery Stable.  Time to JOSE A 4 years    Presenting EGM: A sensed V sensed    Arrhythmia Logbook Reviewed: No sustained events    Device function appears Stable    Continue remote device interrogations every 3 months.

## 2020-02-08 NOTE — TELEPHONE ENCOUNTER
Patient with a concurrent medical history of type 2 diabetes mellitus last seen in the office in May 2019 requesting refill of insulin.  Last hemoglobin A1c was not at therapeutic goal.  Refill sent to patient's pharmacy.  Patient will need to be seen in the office for future refills.

## 2020-02-10 NOTE — PROGRESS NOTES
Nutrition Assessment  Caroline Truong    Discussion: Briefly met with the pt and her friend to address the pt's weight loss and diarrhea. Pt said she had colitis between November to January contributing to significant weight loss. Pt said her diarrhea has recently resolved and her appetite has also started to improve. Pt said nothing has a taste and we discussed ways to improve this. Pt unfortunately tried everything we discussed and her tastes have not improved. All questions and concerns were addressed and answered. I provided my contact information and encouraged her to call or schedule an appointment as needed.    Dong Wood, MS,RD,LD-[place],CD-[place]  Registered Dietitian

## 2020-02-10 NOTE — PROGRESS NOTES
[facility] Medical Oncology     Education for Administration of Chemotherapy and/or Biotherapy     02/10/20    Caroline Truong  [id number]    Ms.Lotta KENNY Truong is here today for education on their upcoming Chemotherapy and/or Biotherapy.     I will be going over their treatment options, obtain signed consent and answer any questions that they may have in regards to the administration of bevacizumab-awwb (MVASI). (Specific drug names listed below).     Caroline KENNY Truong has already consulted with Dr.Yasoda García for the treatment of metastatic renal cell carcinoma. The provider has gone over the same treatment options with the patient and answered their question prior to today's visit.   The goal of the treatment is to:    [] Cure my cancer - means treatment that kills cancer cells to the point my doctor                                     cannot find them in my body and they will not grow back.    [x] Control my cancer - means treatment that keeps cancer from spreading or growing.    [] Relieve my cancer symptoms - means treatment that helps problems such as pain or                                     pressure.     This treatment has been explained to Caroline Truong. Alternative methods of treatment, if any, have been explained to Caroline Truong as have the benefits and risks of each. Based on the physician's explanation of the benefits and risks of this treatment and any alternatives available, The patient agrees that the potential benefit's out weighs the risks involved. I have explained to the patient the most likely complications that might occur from this treatment. The patient understands that along with the treatment additional medications may be necessary to lesson the side effects. Possible side effect may include but are not limited to, any of the following, or a combination of the following:      Allergic Reaction Vision/Eye Changes Sexual Effects    [x]  High Blood Pressure  []  Skin and nail  changes  []  Menopausal symptoms   []  Hearing Loss []  Ulceration at injection site []  Menstrual irregularities   [x]  Fatigue []  Skin rash   []  Fertility effects   [x]  Constipation  [x]  Diarrhea [x]  Hair loss  []  Light/temperature sensitivity []  Heart damage  []  Liver damage   [x]  Loss of appetite [x]  Dizziness []  Lung damage   [x]  Mouth Sores [x]  Muscle aching or weakness [x]  Kidney damage   [x]  Taste Changes []  Forgetfulness []  Nerve damage   [x]  Nausea or Vomiting []  Risk of blood clots [x]  Weight gain/loss   []  Secondary malignancies []  Risk of anemia  []  Risk of bleeding/bruising      While receiving treatment, it has been explained to the patient with regards to their blood counts. This may include but not limited to CBC, Neutropenia ,Anemia, Thrombocytopenia. This handout has been explained and given to the patient.     It was explained to the patient about nutrition and how important it is while undergoing Chemotherapy and/or Biotherapy. Certain medications will be prescribed during the treatment which may change the way foods taste or smell. These changes may cause poor or no appetite. Food is fuel for your body, and if it does not get the fuel it needs, your body may become mal-nourished, which can lead to sever fatigue.It was discussed with the patient about calories and how to add high-calorie foods to their diet.  Protein was also mentioned in regards to how this will help make new cells for the body. Information was given to Caroline Truong in regards to some good protein sources.   We also discussed with the patient how important it was to drink/eat every 2-3 hours while awake. We discussed fluid intake of at least 6 8 ounce glassed of liquids per day to stay hydrated. Some of those are listed below:     Water  Juice (fruit or vegetable)  Soda Sport Drinks Soup   Milk  Ensure, Boost, Glucerna Ice Cream Popsicles Jello   Milkshakes Pudding  Gatorade Sherbert Yogurt     It has  been discussed with the patient the risks of becoming pregnant while receiving Chemotherapy and/or Biotherapy. We also discussed the importance of using reliable barrier methods while participating in intimate activities as this may expose their partners to a potentially harmful drug.     Further home instructions were given to the patient in regards to symptoms, treatment and how to handle those situations as well as when to contact the treatment team or the providers office.     Caroline Truong was given handouts on:   1. Complete Blood Counts and terminology  2. Nutrition during Cancer Therapy   3. Home Instructions  4. [facility] handout on MVASI    I have discussed and gone over the full consent with the patient and answered all their questions regarding the medication they are to receive.  Written information has been provided and reviewed with Caroline Truong. The patient and their family had a chance to ask any questions about the treatment medications and are satisfied with the information that was provided to them.     The patient has read and completed the consent form. They understand the possible risks and benefits of the recommended treatment plan and voluntarily agree to undergo the planned treatment. Should they change their mind and decide to stop treatment at any time, they will notify the providers office.       aJzmin Weston RN  02/10/20  4:36 PM

## 2020-02-12 NOTE — TELEPHONE ENCOUNTER
Case Management/ Note    Patient Name: Caroline Truong  YOB: 1945  MRN #: [id number]    OSW received a message from Yasmin asking to send her transportation resource sheet again. This was mailed to her.    Electronically signed by:   Luz Maria Ulrich LCSW, OSW-C  02/12/20, 3:55 PM

## 2020-02-17 NOTE — TELEPHONE ENCOUNTER
Pt called stating that she has had severe diarrhea since her Avastin treatment. I advised her to come to office for nursing assessment possible fluids. Pt refused and states that she is drinking plenty of fluid along with taking imodium. She also states that the diarrhea is only a couple times a day now. Per pt she doesn't want to received anymore of the avastin. Pt verbalized understanding that I will let Dr. Fenton know this information and to return call with any further concerns.

## 2020-02-17 NOTE — TELEPHONE ENCOUNTER
Called and LM on patient VM stating we received a refill request, but we need to know if refill still needed and to call us. (request is for Lomotil-need to know if her diarrhea is still resolved. At last MD visit it was, but may need to ensure she has some on hand since continues to be issue.).   no

## 2020-02-17 NOTE — TELEPHONE ENCOUNTER
Patient called back. She is still having issues with diarrhea and requested this be refilled. Informed patient I will ask NP's to fill.

## 2020-02-18 NOTE — TELEPHONE ENCOUNTER
----- Message from TOMMY Sy sent at 2/17/2020  4:43 PM EST -----  Regarding: hold tx?   Does pt want to hold treatment on 2/24/20 and discuss with Dev if she wants to continue at her appt on 3/4/20?     Electronically signed by TOMMY Sy, 02/17/20, 4:44 PM.

## 2020-03-14 PROBLEM — E11.9 DIABETES MELLITUS (HCC): Status: ACTIVE | Noted: 2020-01-01

## 2020-03-14 NOTE — ED NOTES
Food tray ordered for patient.  It will be delivered in approx 45min     Josefa Kirkland, Vicenta Rep  03/14/20 1111

## 2020-03-14 NOTE — ED PROVIDER NOTES
Subjective   74-year-old female complaining of weakness and then became unresponsive this morning.  Patient was found to have a blood sugar 27 was given an amp of D50 with return to normal mental status.  The patient's family report that she has been noted to have decreased appetite recently.  They state that the patient is eating irregularly.  They report she is under treatment for cancer.  They report that she is had no recent fever or chills.  Reports no nausea vomiting or diarrhea.  There is been no reports of dysphasia, choking, or coughing.  The patient had no recent unusual food water or travel and there is been no reports of melena hematemesis or hematochezia          Review of Systems    Past Medical History:   Diagnosis Date   • Abnormal laboratory test 12/07/2018   • Anemia 12/11/2018   • Atrioventricular block, complete (CMS/[facility]) 08/29/2014   • Bilateral leg edema 09/09/2014   • Bradycardia 08/29/2014   • Chest discomfort 08/24/2014   • Clear cell carcinoma of kidney (CMS/[facility]) 01/28/2019   • Deep venous thrombosis of arm (CMS/[facility]) 11/19/2018   • Diabetes mellitus (CMS/[facility]) 10/22/2018   • Dyspnea 10/15/2018   • Dyspnea 08/29/2014   • Fatigue 10/15/2018   • Hypertension 08/29/2014   • Localized swelling, left upper limb 11/16/2018   • Osteopenia 10/30/2018   • Overweight 10/15/2018   • Pacemaker 09/30/2014    permanent   • Postmenopausal status 10/15/2018   • Preop examination 11/30/2018   • Renal insufficiency 12/11/2018       Allergies   Allergen Reactions   • Levaquin [Levofloxacin] Delirium       Past Surgical History:   Procedure Laterality Date   • CARDIAC CATHETERIZATION  03/14/2017   • PACEMAKER IMPLANTATION  09/18/2014    Dual chamber- BS   • PACEMAKER IMPLANTATION     • TOTAL ABDOMINAL HYSTERECTOMY  2000       Family History   Problem Relation Age of Onset   • Hypertension Mother        Social History     Socioeconomic History   • Marital status:      Spouse name: Not on file   • Number of  children: Not on file   • Years of education: Not on file   • Highest education level: Not on file   Tobacco Use   • Smoking status: Never Smoker   • Smokeless tobacco: Never Used   Substance and Sexual Activity   • Alcohol use: No     Frequency: Never   • Drug use: No   • Sexual activity: Never       Negative unusual    Objective   Physical Exam  Alert Hepler Coma Scale 15 appears chronically ill   HEENT: Pupils equal and reactive to light. Conjunctivae are not injected. normal tympanic membranes. Oropharynx and nares are normal.   Neck: Supple. Midline trachea. No JVD. No goiter.   Chest: Clear and equal breath sounds bilaterally regular rate and rhythm without murmur or rub.   Abdomen: Positive bowel sounds nontender nondistended. No rebound or peritoneal signs. No CVA tenderness.   Extremities no clubbing cyanosis or edema motor sensory exam is normal the full range of motion is intact   skin: Warm and dry, no rashes or petechia.   Lymphatic: No regional lymphadenopathy. No calf pain, swelling or Jamie's sign    Procedures           ED Course  ED Course as of Mar 14 1331   Sat Mar 14, 2020   1325 Stable ER course, was able to eat.  Family reports recent decrease in appetite secondary to a bad taste in her mouth.  No evidence of thrush    [TH]      ED Course User Index  [TH] Cortez Armstrong MD                 Labs Reviewed   COMPREHENSIVE METABOLIC PANEL - Abnormal; Notable for the following components:       Result Value    Glucose 21 (*)     BUN 54 (*)     Creatinine 2.30 (*)     Chloride 114 (*)     CO2 19.0 (*)     Calcium 7.6 (*)     Total Protein 4.0 (*)     Albumin 2.00 (*)     ALT (SGPT) 85 (*)     AST (SGOT) 109 (*)     eGFR Non  Amer 21 (*)     All other components within normal limits    Narrative:     GFR Normal >60  Chronic Kidney Disease <60  Kidney Failure <15     CBC WITH AUTO DIFFERENTIAL - Abnormal; Notable for the following components:    RDW 16.0 (*)     All other components within  normal limits   MANUAL DIFFERENTIAL - Abnormal; Notable for the following components:    Neutrophil % 20.0 (*)     Lymphocyte % 10.0 (*)     Bands %  57.0 (*)     Metamyelocyte % 4.0 (*)     Myelocyte % 1.0 (*)     Lymphocytes Absolute 0.68 (*)     All other components within normal limits   POCT GLUCOSE FINGERSTICK - Abnormal; Notable for the following components:    Glucose <20 (*)     All other components within normal limits   POCT GLUCOSE FINGERSTICK - Abnormal; Notable for the following components:    Glucose 183 (*)     All other components within normal limits   POCT GLUCOSE FINGERSTICK - Abnormal; Notable for the following components:    Glucose 164 (*)     All other components within normal limits   POCT GLUCOSE FINGERSTICK - Abnormal; Notable for the following components:    Glucose 123 (*)     All other components within normal limits   SCAN SLIDE   PATH CONSULT REFLEX   URINALYSIS W/ CULTURE IF INDICATED   PATHOLOGY CONSULTATION   CBC AND DIFFERENTIAL    Narrative:     The following orders were created for panel order CBC & Differential.  Procedure                               Abnormality         Status                     ---------                               -----------         ------                     CBC Auto Differential[[id number]]        Abnormal            Final result                 Please view results for these tests on the individual orders.   EXTRA TUBES    Narrative:     The following orders were created for panel order Extra Tubes.  Procedure                               Abnormality         Status                     ---------                               -----------         ------                     Light Blue Top[[id number]]                                   Final result                 Please view results for these tests on the individual orders.   LIGHT BLUE TOP     Medications   dextrose 5 % and sodium chloride 0.45 % infusion 500 mL (0 mL Intravenous Stopped 3/14/20 1205)    dextrose (D50W) 25 g/ 50mL Intravenous Solution 25 g (25 g Intravenous Given 3/14/20 1037)                                MDM  Number of Diagnoses or Management Options     Amount and/or Complexity of Data Reviewed  Clinical lab tests: reviewed    Risk of Complications, Morbidity, and/or Mortality  Presenting problems: high  Diagnostic procedures: high  Management options: high  General comments: Patient was able to tolerate oral intake in the emergency department.  The patient will be discharged.  She is encouraged to increase her caloric intake as directed.  The patient was stable at discharge and vocalized understanding of discharge instructions and warnings        Final diagnoses:   Hypoglycemic episode in patient with diabetes mellitus (CMS/HCC)   Moderate protein-calorie malnutrition (CMS/HCC)   Metastatic cancer to bone (CMS/HCC)            Cortez Armstrong MD  03/14/20 1390

## 2020-03-14 NOTE — DISCHARGE INSTRUCTIONS
Plenty of fluids today  Attempt to increase calorie intake, ask your doctor about Megace therapy  Tylenol as needed for discomfort  Further orders per primary care provider

## 2020-03-15 NOTE — DISCHARGE SUMMARY
[facility] PULMONARY/CRITICAL CARE/SLEEP MEDICINE DEATH SUMMARY    PATIENT NAME:  Caroline Truong        :  1945           MRN:  [id number]    ADMISSION DATE:  3/14/2020      SMOKING STATUS:  Never smoker    DATE/TIME OF DEATH:    3/15/2020 at 2:00 AM     CAUSE OF DEATH  Acute urinary tract infection, complicated by septic shock    FINAL DIAGNOSES  Septic shock  Acute urinary tract infection  Acute hypoglycemic episodes, recurrent with recent hypoglycemic coma; history of type 2 diabetes  Coagulopathy  Leukopenia with bandemia  Metabolic acidosis  Acute kidney injury  Metastatic renal cell carcinoma with bone metastasis, status post chemotherapy  Left upper extremity unprovoked DVT, on chronic anticoagulation with Xarelto      ADMISSION DIAGNOSES/PRESENTING PROBLEMS  Same as above.    HOSPITAL COURSE  Ms. Caroline Truong was a 74-year-old female with past medical history of insulin-dependent diabetes, renal cell carcinoma with metastatic bone cancer, status post chemotherapy, history of DVT to the upper extremity on an anticoagulant, coronary artery disease, AV block complete with permanent pacemaker, hypertension.  Patient had presented to the ED earlier today in which she had an episode of hypoglycemic coma, had glucose replacement, and was doing better, and was discharged home with her daughter.  It was noted the patient's last chemotherapy session was approximately 2 months ago.  Additionally, per ED physician, the daughter reported that earlier yesterday evening, patient became weak, with slurred speech, unable to stand, and again brought patient back to the emergency department.  ED physician noted that it upon this assessment, she appeared to be confused, complaining of shortness of breath, but it was stated by her daughter that that is a chronic complaint.  There was no reported cough, congestion, fever, chills, or vomiting.  It was reported that she had diarrhea, when she was on chemotherapy, but that she is  not presently having any diarrhea at this point.  Additionally of note, patient does have a history of DVT involving the upper extremity, and is currently on an anticoagulant of Xarelto.  Per patient's home medication list, she only took Lantus 7 units daily.    In the ED, patient's temperature on arrival was 94.3 °F, patient was hypertensive with a blood pressure of 142/104, heart rate of 118, respirations of 19 with oxygen saturation of 92%.  Patient was placed on Shukri hugger with temperature monitoring rectally.  There was noted bruising and edema to the right lower extremity as well as edema to the left lower extremity without bruising.  Neurologically, ED physician stated that patient did not demonstrate an acute focal deficit.  Per ED physician, patient's glucose was noted to be 127.  It was noted that earlier, during her previous ED visit, patient's blood sugar was 21.  Labs were obtained, patient's blood sugar was found to be 39.  Patient's blood pressure also continued to drop, and she was bolused with IV fluids, given an amp of D50, and subsequently had to be placed on Levophed.  After review of patient's labs, patient was found to be in metabolic acidosis, in which a sodium bicarbonate drip was ordered, but due to compatibility issues with medications, it was not able to be started, and family requested that patient not be stuck.  It had already been discussed with patient's family, that patient was to be a DNR/DNI, but they did not want to pursue treatment for patient up until that point.  Patient was ordered to have periodic checks of her blood glucose to ensure that it did not continue to drop.  Additionally with her labs, she was found to have a troponin of 0.033, BNP 10,047, INR was elevated at 3.11, and patient had an elevated BUN/creatinine of 54/2.76, WBC 2.30 with bandemia of 32%.  Patient had a severe protein deficiency with total protein of 3.9 as well as an albumin level of 1.80.  Urinalysis  was obtained revealing 2+ bacteria.  Patient was found to be uroseptic.  Patient was given a dose of Fortaz IV per ED physician.  It was at this time, that the intensivist service was contacted for admission to ICU for further treatment and evaluation of patient condition.  Per discussion with ED nurse, patient was starting to decline, requiring increase in Levophed titration.  It was again discussed with patient and her power of , who again voiced that patient is a DNR/DNI.  Prior to being able to see and evaluate this patient, and complete patient's history and physical, patient subsequently developed asystole.  ED RN provided emotional support to family.  This nurse practitioner came to bedside, discussed with patient's family, provided emotional support, and family expressed appreciation for the care that this patient received.    Time of death noted to be on 3/15/2020 at 2:00 AM.  Emotional support provided to family.       PROCEDURES PERFORMED  None       CONSULTING PHYSICIANS  Consults     Date and Time Order Name Status Description    3/14/2020 2320 Intensivist (on-call MD unless specified) Completed           RESULTS REVIEW  LABS  Lab Results (last 24 hours)     Procedure Component Value Units Date/Time    CBC & Differential [[id number]] Collected:  03/14/20 1112    Specimen:  Blood Updated:  03/14/20 1230    Narrative:       The following orders were created for panel order CBC & Differential.  Procedure                               Abnormality         Status                     ---------                               -----------         ------                     CBC Auto Differential[[id number]]        Abnormal            Final result                 Please view results for these tests on the individual orders.    Comprehensive Metabolic Panel [[id number]]  (Abnormal) Collected:  03/14/20 1112    Specimen:  Blood Updated:  03/14/20 1144     Glucose 21 mg/dL      BUN 54 mg/dL      Creatinine 2.30  mg/dL      Sodium 145 mmol/L      Potassium 4.5 mmol/L      Chloride 114 mmol/L      CO2 19.0 mmol/L      Calcium 7.6 mg/dL      Total Protein 4.0 g/dL      Albumin 2.00 g/dL      ALT (SGPT) 85 U/L      AST (SGOT) 109 U/L      Alkaline Phosphatase 84 U/L      Total Bilirubin 0.5 mg/dL      eGFR Non African Amer 21 mL/min/1.73      Globulin 2.0 gm/dL      A/G Ratio 1.0 g/dL      BUN/Creatinine Ratio 23.5     Anion Gap 12.0 mmol/L     Narrative:       GFR Normal >60  Chronic Kidney Disease <60  Kidney Failure <15      CBC Auto Differential [[id number]]  (Abnormal) Collected:  03/14/20 1112    Specimen:  Blood Updated:  03/14/20 1230     WBC 6.80 10*3/mm3      RBC 4.57 10*6/mm3      Hemoglobin 14.9 g/dL      Hematocrit 42.2 %      MCV 92.3 fL      MCH 32.5 pg      MCHC 35.2 g/dL      RDW 16.0 %      RDW-SD 52.1 fl      MPV 8.4 fL      Platelets 205 10*3/mm3     Scan Slide [[id number]] Collected:  03/14/20 1112    Specimen:  Blood Updated:  03/14/20 1230     Scan Slide --     Comment: See Manual Differential Results       Manual Differential [[id number]]  (Abnormal) Collected:  03/14/20 1112    Specimen:  Blood Updated:  03/14/20 1230     Neutrophil % 20.0 %      Lymphocyte % 10.0 %      Monocyte % 8.0 %      Bands %  57.0 %      Metamyelocyte % 4.0 %      Myelocyte % 1.0 %      Neutrophils Absolute 5.24 10*3/mm3      Lymphocytes Absolute 0.68 10*3/mm3      Monocytes Absolute 0.54 10*3/mm3      RBC Morphology Normal     Smudge Cells Mod/2+     Toxic Granulation Slight/1+     Vacuolated Neutrophils Slight/1+     Platelet Morphology Normal    Path Consult Reflex [[id number]] Collected:  03/14/20 1112    Specimen:  Blood Updated:  03/14/20 1230     Pathology Review Yes    POC Glucose Once [[id number]]  (Abnormal) Collected:  03/14/20 1204    Specimen:  Blood Updated:  03/14/20 1207     Glucose 164 mg/dL      Comment: Serial Number: [id number]:  [id number]       POC Glucose Once [[id number]]  (Abnormal) Collected:  03/14/20  1320    Specimen:  Blood Updated:  03/14/20 1320     Glucose 123 mg/dL      Comment: Serial Number: [id number]:  [id number]       Comprehensive Metabolic Panel [[id number]]  (Abnormal) Collected:  03/14/20 2121    Specimen:  Blood Updated:  03/14/20 2155     Glucose 39 mg/dL      BUN 54 mg/dL      Creatinine 2.76 mg/dL      Sodium 140 mmol/L      Potassium 5.0 mmol/L      Chloride 110 mmol/L      CO2 12.0 mmol/L      Calcium 7.7 mg/dL      Total Protein 3.9 g/dL      Albumin 1.80 g/dL      ALT (SGPT) 94 U/L      AST (SGOT) 93 U/L      Alkaline Phosphatase 85 U/L      Total Bilirubin 0.5 mg/dL      eGFR Non African Amer 17 mL/min/1.73      Globulin 2.1 gm/dL      A/G Ratio 0.9 g/dL      BUN/Creatinine Ratio 19.6     Anion Gap 18.0 mmol/L     Narrative:       GFR Normal >60  Chronic Kidney Disease <60  Kidney Failure <15      Protime-INR [[id number]]  (Abnormal) Collected:  03/14/20 2121    Specimen:  Blood Updated:  03/14/20 2149     Protime 29.2 Seconds      INR 3.11    aPTT [[id number]]  (Abnormal) Collected:  03/14/20 2121    Specimen:  Blood Updated:  03/14/20 2149     PTT 69.1 seconds     BNP [[id number]]  (Abnormal) Collected:  03/14/20 2121    Specimen:  Blood Updated:  03/14/20 2150     proBNP 10,047.0 pg/mL     Narrative:       Among patients with dyspnea, NT-proBNP is highly sensitive for the detection of acute congestive heart failure. In addition NT-proBNP of <300 pg/ml effectively rules out acute congestive heart failure with 99% negative predictive value.    Results may be falsely decreased if patient taking Biotin.      Troponin [[id number]]  (Abnormal) Collected:  03/14/20 2121    Specimen:  Blood Updated:  03/14/20 2156     Troponin T 0.033 ng/mL     Narrative:       Troponin T Reference Range:  <= 0.03 ng/mL-   Negative for AMI  >0.03 ng/mL-     Abnormal for myocardial necrosis.  Clinicians would have to utilize clinical acumen, EKG, Troponin and serial changes to determine if it is an Acute  "Myocardial Infarction or myocardial injury due to an underlying chronic condition.       Results may be falsely decreased if patient taking Biotin.      Procalcitonin [[id number]]  (Abnormal) Collected:  03/14/20 2121    Specimen:  Blood Updated:  03/15/20 0117     Procalcitonin 24.63 ng/mL     Narrative:       As a Marker for Sepsis (Non-Neonates):   1. <0.5 ng/mL represents a low risk of severe sepsis and/or septic shock.  1. >2 ng/mL represents a high risk of severe sepsis and/or septic shock.    As a Marker for Lower Respiratory Tract Infections that require antibiotic therapy:  PCT on Admission     Antibiotic Therapy             6-12 Hrs later  > 0.5                Strongly Recommended            >0.25 - <0.5         Recommended  0.1 - 0.25           Discouraged                   Remeasure/reassess PCT  <0.1                 Strongly Discouraged          Remeasure/reassess PCT      As 28 day mortality risk marker: \"Change in Procalcitonin Result\" (> 80 % or <=80 %) if Day 0 (or Day 1) and Day 4 values are available. Refer to http://www."[employer]"s-pct-calculator.com/   Change in PCT <=80 %   A decrease of PCT levels below or equal to 80 % defines a positive change in PCT test result representing a higher risk for 28-day all-cause mortality of patients diagnosed with severe sepsis or septic shock.  Change in PCT > 80 %   A decrease of PCT levels of more than 80 % defines a negative change in PCT result representing a lower risk for 28-day all-cause mortality of patients diagnosed with severe sepsis or septic shock.                Results may be falsely decreased if patient taking Biotin.     C-reactive Protein [[id number]]  (Abnormal) Collected:  03/14/20 2121    Specimen:  Blood Updated:  03/15/20 0106     C-Reactive Protein 30.50 mg/dL     Phosphorus [[id number]]  (Abnormal) Collected:  03/14/20 2121    Specimen:  Blood Updated:  03/15/20 0106     Phosphorus 5.0 mg/dL     Magnesium [[id number]]  (Abnormal) Collected:  " 03/14/20 2121    Specimen:  Blood Updated:  03/15/20 0106     Magnesium 1.5 mg/dL     Cortisol [[id number]] Collected:  03/14/20 2121    Specimen:  Blood Updated:  03/15/20 0132     Cortisol 189.60 mcg/dL     Narrative:       Cortisol Reference Ranges:    Cortisol 6AM - 10AM Range: 6.02-18.40 mcg/dl  Cortisol 4PM - 8PM Range: 2.68-10.50 mcg/dl      Results may be falsely increased if patient taking Biotin.      CBC & Differential [[id number]] Collected:  03/14/20 2122    Specimen:  Blood Updated:  03/14/20 2230    Narrative:       The following orders were created for panel order CBC & Differential.  Procedure                               Abnormality         Status                     ---------                               -----------         ------                     CBC Auto Differential[[id number]]        Abnormal            Final result                 Please view results for these tests on the individual orders.    CBC Auto Differential [[id number]]  (Abnormal) Collected:  03/14/20 2122    Specimen:  Blood Updated:  03/14/20 2230     WBC 2.30 10*3/mm3      RBC 4.80 10*6/mm3      Hemoglobin 14.8 g/dL      Hematocrit 45.0 %      MCV 93.8 fL      MCH 31.0 pg      MCHC 33.0 g/dL      RDW 16.0 %      RDW-SD 52.9 fl      MPV 7.9 fL      Platelets 212 10*3/mm3     Scan Slide [[id number]] Collected:  03/14/20 2122    Specimen:  Blood Updated:  03/14/20 2230     Scan Slide --     Comment: See Manual Differential Results       Manual Differential [[id number]]  (Abnormal) Collected:  03/14/20 2122    Specimen:  Blood Updated:  03/14/20 2230     Neutrophil % 52.0 %      Lymphocyte % 10.0 %      Monocyte % 2.0 %      Bands %  32.0 %      Metamyelocyte % 4.0 %      Neutrophils Absolute 1.93 10*3/mm3      Lymphocytes Absolute 0.23 10*3/mm3      Monocytes Absolute 0.05 10*3/mm3      nRBC 1.0 /100 WBC      Bren Cells Large/3+     Vacuolated Neutrophils Mod/2+     Giant Platelets Slight/1+    Protime-INR [[id number]]  (Abnormal)  Collected:  03/14/20 2233    Specimen:  Blood Updated:  03/14/20 2308     Protime 29.6 Seconds      INR 3.15    aPTT [[id number]]  (Abnormal) Collected:  03/14/20 2233    Specimen:  Blood Updated:  03/14/20 2321     PTT 44.7 seconds      Comment: Result checked        Urinalysis With Culture If Indicated - Urine, Catheter [[id number]]  (Abnormal) Collected:  03/14/20 2316    Specimen:  Urine, Catheter Updated:  03/14/20 2351     Color, UA Dark Yellow     Appearance, UA Cloudy     Comment: Result checked         pH, UA <=5.0     Specific Gravity, UA 1.023     Glucose, UA Negative     Ketones, UA Trace     Bilirubin, UA Small (1+)     Comment: Confirmation testing is unavailable.  A serum bilirubin is recommended for further assessment.        Blood, UA Negative     Protein,  mg/dL (2+)     Leuk Esterase, UA Trace     Nitrite, UA Negative     Urobilinogen, UA 1.0 E.U./dL    Urinalysis, Microscopic Only - Urine, Catheter [[id number]]  (Abnormal) Collected:  03/14/20 2316    Specimen:  Urine, Catheter Updated:  03/14/20 2351     RBC, UA 3-5 /HPF      WBC, UA 3-5 /HPF      Bacteria, UA 2+ /HPF      Squamous Epithelial Cells, UA 0-2 /HPF      Hyaline Casts, UA 21-30 /LPF      Methodology Manual Light Microscopy    POC Glucose Once [[id number]]  (Normal) Collected:  03/14/20 2337    Specimen:  Blood Updated:  03/14/20 2338     Glucose 75 mg/dL      Comment: Serial Number: [id number]:  [id number]             MICRO:  No cultures completed at time of assessment; blood cultures not ordered by ED physician.  ED physician did not order reflex urine culture.      RADIOLOGY:  Xr Chest 1 View    Result Date: 3/15/2020  No acute cardiopulmonary disease is seen radiographically.   Electronically Signed By-Dr. Prince Boateng MD On:3/15/2020 7:11 AM This report was finalized on [id number] by Dr. Prince Boateng MD.    For more specific information regarding this patient’s hospital stay at [facility], please refer  to patient’s full medical record.  This death summary has been scribed by this nurse practitioner for the attending physician, Dr. Landaverde.  This patient was only able to be evaluated by Dr. Pascual, ED physician, as she had passed prior to ability to fully admit patient.    TOMMY Ramos  [facility]  03/15/2020

## 2020-03-15 NOTE — ED NOTES
[facility] contacted and rejected @ 0300. Call out to .  Anna Stokes RN  03/15/20 0308       Anna Stokes RN  03/15/20 0308

## 2020-03-15 NOTE — ED NOTES
Patient and POA wishes DNR.  2nd line is needed to start bicarb in D5W that is incompatible with levophed.  They do not wish for patient to be stuck again at this time.  HR is increasing and BP is decreasing.  Levophed gtt has been increased to 0.3     Anna Stokes RN  03/15/20 0140

## 2020-03-15 NOTE — ED NOTES
BP not improved after 500 ml NS bolus.  Dr Pascual at bedside discussing critical status with family.  Pt started on levophed gtt.  Pt heart rhythm back in SR at this time.      Anna Stokes RN  03/14/20 0385       Anna Stokes RN  03/14/20 7099

## 2020-03-15 NOTE — ED NOTES
[facility] contacted. Waiting their arrival.     Anna Stokes RN  03/15/20 0321       Anna Stokes RN  03/15/20 0321

## 2020-03-15 NOTE — ED NOTES
Per monitor asystole. Pt not breathing.  Marc URIARTE notified and came to bedside. TOD 0200.  Ring and medic alert bracelet given to Sarah ANDREWS, [phone].  Family was satisfied with the care and very appreciative and complimentary of staff.      Anna Stokes, RN  03/15/20 4583

## 2020-03-15 NOTE — ED NOTES
Pt has friend at bedside.  States pt was here earlier for confusion and low glucose, hypothermia.  dc'd back home.  Friend states pt isn't getting any better and she doesn't know what to do with her.  Pt lives alone.  Pt c/o light headed, gen weakness and headache.       Anna Stokes, RN  03/14/20 2048

## 2020-03-15 NOTE — ED NOTES
Attempted urine collection, scant amount, not sufficient to send to lab. Pt continues to have on bear hugger, will continue to check temp rectally.      Anna Stokes RN  03/14/20 8721

## 2020-03-15 NOTE — ED PROVIDER NOTES
Subjective   History of Present Illness  74-year-old female insulin-dependent diabetic had an episode of hypoglycemic coma in earlier today.  The patient was seen in the emergency department and had her glucose replaced and was doing much better and went home with her daughter.  The patient does have a history of renal cell carcinoma with metastatic disease to the bone.  The last chemotherapy was 2 months ago.  The daughter states that this evening she was very weak she had slurred speech she was unable to stand and so she was brought to the emergency department.  At the time that I saw the patient she appears to be confused and was complaining of shortness of breath but her daughter states that she complains of that chronically.  There is been no recent cough or congestion fever or chills there is been no vomiting.  She had diarrhea when she was on chemotherapy but is not having diarrhea at this point time.  She does have a history of DVT involving the upper extremity and is currently on an anticoagulant.  The daughter has noticed that there has been significant bruising involving the right lower leg over the course of the past week.  She has a history of heart disease with a pacemaker.  Review of Systems    Past Medical History:   Diagnosis Date   • Abnormal laboratory test 12/07/2018   • Anemia 12/11/2018   • Atrioventricular block, complete (CMS/HCC) 08/29/2014   • Bilateral leg edema 09/09/2014   • Bradycardia 08/29/2014   • Chest discomfort 08/24/2014   • Clear cell carcinoma of kidney (CMS/HCC) 01/28/2019   • Deep venous thrombosis of arm (CMS/HCC) 11/19/2018   • Diabetes mellitus (CMS/HCC) 10/22/2018   • Dyspnea 10/15/2018   • Dyspnea 08/29/2014   • Fatigue 10/15/2018   • Hypertension 08/29/2014   • Localized swelling, left upper limb 11/16/2018   • Osteopenia 10/30/2018   • Overweight 10/15/2018   • Pacemaker 09/30/2014    permanent   • Postmenopausal status 10/15/2018   • Preop examination 11/30/2018   •  Renal insufficiency 12/11/2018       Allergies   Allergen Reactions   • Levaquin [Levofloxacin] Delirium       Past Surgical History:   Procedure Laterality Date   • CARDIAC CATHETERIZATION  03/14/2017   • PACEMAKER IMPLANTATION  09/18/2014    Dual chamber- BS   • PACEMAKER IMPLANTATION     • TOTAL ABDOMINAL HYSTERECTOMY  2000       Family History   Problem Relation Age of Onset   • Hypertension Mother        Social History     Socioeconomic History   • Marital status:      Spouse name: Not on file   • Number of children: Not on file   • Years of education: Not on file   • Highest education level: Not on file   Tobacco Use   • Smoking status: Never Smoker   • Smokeless tobacco: Never Used   Substance and Sexual Activity   • Alcohol use: No     Frequency: Never   • Drug use: No   • Sexual activity: Never           Objective   Physical Exam  On exam she is awake but she appears to be somewhat confused and lethargic at temperature is 94.3 blood pressure 142/104 heart rate 118 and respirations are 19 sats are 92% HEENT exam pupils equal round reactive to light EOMs are full ENT is unremarkable the neck is supple and nontender without JVD the chest is clear cardiovascular exam reveals a regular rhythm her abdomen was soft and nontender the patient has bruising as well has edema to the right lower extremity there is edema to the left lower extremity but no bruising she is able to move the extremities but is generally weak and neurologically does not demonstrate an acute focal deficit.  Her glucose was 127.  Earlier this morning during her episode it was 21.  The patient's blood pressure continued to drop and she was bolused with fluids also given D50 and had to be placed on Levophed.  I discussed her prognosis with her family and informed them that she was extremely serious at this point in time and may not make it through the evening.  Patient will be admitted to the ICU on pressors and also empiric antibiotics.   She will need to have periodic checks of her glucose to make sure that she is not hypoglycemic  Procedures           ED Course                 Results for orders placed or performed during the hospital encounter of 03/14/20   Comprehensive Metabolic Panel   Result Value Ref Range    Glucose 39 (C) 65 - 99 mg/dL    BUN 54 (H) 8 - 23 mg/dL    Creatinine 2.76 (H) 0.57 - 1.00 mg/dL    Sodium 140 136 - 145 mmol/L    Potassium 5.0 3.5 - 5.2 mmol/L    Chloride 110 (H) 98 - 107 mmol/L    CO2 12.0 (L) 22.0 - 29.0 mmol/L    Calcium 7.7 (L) 8.6 - 10.5 mg/dL    Total Protein 3.9 (L) 6.0 - 8.5 g/dL    Albumin 1.80 (L) 3.50 - 5.20 g/dL    ALT (SGPT) 94 (H) 1 - 33 U/L    AST (SGOT) 93 (H) 1 - 32 U/L    Alkaline Phosphatase 85 39 - 117 U/L    Total Bilirubin 0.5 0.2 - 1.2 mg/dL    eGFR Non African Amer 17 (L) >60 mL/min/1.73    Globulin 2.1 gm/dL    A/G Ratio 0.9 g/dL    BUN/Creatinine Ratio 19.6 7.0 - 25.0    Anion Gap 18.0 (H) 5.0 - 15.0 mmol/L   Protime-INR   Result Value Ref Range    Protime 29.2 (H) 19.4 - 28.5 Seconds    INR 3.11 (H) 2.00 - 3.00   aPTT   Result Value Ref Range    PTT 69.1 (C) 24.0 - 31.0 seconds   BNP   Result Value Ref Range    proBNP 10,047.0 (H) 5.0 - 900.0 pg/mL   Troponin   Result Value Ref Range    Troponin T 0.033 (C) 0.000 - 0.030 ng/mL   CBC Auto Differential   Result Value Ref Range    WBC 2.30 (L) 3.40 - 10.80 10*3/mm3    RBC 4.80 3.77 - 5.28 10*6/mm3    Hemoglobin 14.8 12.0 - 15.9 g/dL    Hematocrit 45.0 34.0 - 46.6 %    MCV 93.8 79.0 - 97.0 fL    MCH 31.0 26.6 - 33.0 pg    MCHC 33.0 31.5 - 35.7 g/dL    RDW 16.0 (H) 12.3 - 15.4 %    RDW-SD 52.9 37.0 - 54.0 fl    MPV 7.9 6.0 - 12.0 fL    Platelets 212 140 - 450 10*3/mm3   Scan Slide   Result Value Ref Range    Scan Slide     Manual Differential   Result Value Ref Range    Neutrophil % 52.0 42.7 - 76.0 %    Lymphocyte % 10.0 (L) 19.6 - 45.3 %    Monocyte % 2.0 (L) 5.0 - 12.0 %    Bands %  32.0 (H) 0.0 - 5.0 %    Metamyelocyte % 4.0 (H) 0.0 - 0.0 %     Neutrophils Absolute 1.93 1.70 - 7.00 10*3/mm3    Lymphocytes Absolute 0.23 (L) 0.70 - 3.10 10*3/mm3    Monocytes Absolute 0.05 (L) 0.10 - 0.90 10*3/mm3    nRBC 1.0 (H) 0.0 - 0.2 /100 WBC    Bren Cells Large/3+ None Seen    Vacuolated Neutrophils Mod/2+ None Seen    Giant Platelets Slight/1+ None Seen   Protime-INR   Result Value Ref Range    Protime 29.6 (H) 9.6 - 11.7 Seconds    INR 3.15 (C) 0.90 - 1.10   aPTT   Result Value Ref Range    PTT 44.7 (H) 24.0 - 31.0 seconds     Medications   sodium chloride 0.9 % flush 10 mL (has no administration in time range)   norepinephrine (LEVOPHED) 8 mg/250 mL (32 mcg/mL) in sodium chloride 0.9% infusion (premix) (0.1 mcg/kg/min × 59 kg Intravenous New Bag 3/14/20 2324)   albuterol (PROVENTIL) nebulizer solution 0.083% 2.5 mg/3mL (2.5 mg Nebulization Given 3/14/20 2301)   sodium chloride 0.9 % bolus 500 mL (0 mL Intravenous Stopped 3/14/20 2316)   dextrose (D50W) 25 g/ 50mL Intravenous Solution 25 g (25 g Intravenous Given 3/14/20 2237)     No radiology results for the last day                               MDM  Hypoglycemic coma  Final diagnoses:   Other specified diabetes mellitus with hypoglycemia and coma, with long-term current use of insulin (CMS/HCC)   Hypotension, unspecified hypotension type   Renal cell carcinoma, unspecified laterality (CMS/HCC)   Metastatic bone cancer (CMS/HCC)   Coagulopathy (CMS/HCC)            Chester Pascual MD  03/14/20 [id number]

## 2020-03-15 NOTE — H&P
Unable to complete H&P, patient was accepted by this nurse practitioner on behalf of Dr. Landaverde.  Patient had been stabilized in the ED and remained on Levophed.  General admission orders placed, and prior to being able to travel to ED to see patient, she abruptly developed asystole.  Prior to this patient had been confirmed by ED Physician and ED nursing staff that she was a DNR/DNI.  There was no opportunity to perform an H&P, please see discharge summary for further information.    Everardo Donaldson, APRN  03/15/2020

## 2025-02-14 NOTE — TELEPHONE ENCOUNTER
Patient called stating she is scheduled for Opdivo next Monday 2/3/2020. She is questioning whether she should get her treatment or not because she is back on steroids for having a lot of diarrhea. She sees Dr. Fenton the next day. I told her we would cancel the Monday appt and let her see the doctor first and then if Dr. Fenton does want her to get her treatment next week we'll just reschedule it for a different day. Notified April Angel to cancel the appt.    Statement Selected